# Patient Record
Sex: FEMALE | Race: WHITE | Employment: UNEMPLOYED | URBAN - METROPOLITAN AREA
[De-identification: names, ages, dates, MRNs, and addresses within clinical notes are randomized per-mention and may not be internally consistent; named-entity substitution may affect disease eponyms.]

---

## 2017-01-19 ENCOUNTER — OFFICE VISIT (OUTPATIENT)
Dept: FAMILY MEDICINE CLINIC | Age: 27
End: 2017-01-19

## 2017-01-19 ENCOUNTER — HOSPITAL ENCOUNTER (OUTPATIENT)
Age: 27
Setting detail: SPECIMEN
Discharge: HOME | End: 2017-01-19
Payer: COMMERCIAL

## 2017-01-19 VITALS
OXYGEN SATURATION: 98 % | TEMPERATURE: 97.7 F | HEIGHT: 63 IN | WEIGHT: 212.13 LBS | SYSTOLIC BLOOD PRESSURE: 122 MMHG | RESPIRATION RATE: 14 BRPM | HEART RATE: 88 BPM | DIASTOLIC BLOOD PRESSURE: 68 MMHG | BODY MASS INDEX: 37.59 KG/M2

## 2017-01-19 DIAGNOSIS — J02.9 PHARYNGITIS, UNSPECIFIED ETIOLOGY: ICD-10-CM

## 2017-01-19 DIAGNOSIS — J02.9 PHARYNGITIS, UNSPECIFIED ETIOLOGY: Primary | ICD-10-CM

## 2017-01-19 PROCEDURE — 87070 CULTURE OTHR SPECIMN AEROBIC: CPT

## 2017-01-19 PROCEDURE — 99213 OFFICE O/P EST LOW 20 MIN: CPT | Performed by: NURSE PRACTITIONER

## 2017-01-19 PROCEDURE — 87880 STREP A ASSAY W/OPTIC: CPT | Performed by: NURSE PRACTITIONER

## 2017-01-19 ASSESSMENT — ENCOUNTER SYMPTOMS
VISUAL CHANGE: 0
COUGH: 1
ABDOMINAL PAIN: 0
WHEEZING: 0
SHORTNESS OF BREATH: 0
SORE THROAT: 1
SWOLLEN GLANDS: 0

## 2017-01-22 LAB
ORGANISM: ABNORMAL
THROAT CULTURE: ABNORMAL
THROAT CULTURE: ABNORMAL

## 2017-01-23 ENCOUNTER — TELEPHONE (OUTPATIENT)
Dept: FAMILY MEDICINE CLINIC | Age: 27
End: 2017-01-23

## 2017-01-23 DIAGNOSIS — B37.0 ORAL THRUSH: Primary | ICD-10-CM

## 2017-02-21 ENCOUNTER — HOSPITAL ENCOUNTER (OUTPATIENT)
Dept: NON INVASIVE DIAGNOSTICS | Age: 27
Discharge: HOME | End: 2017-02-21
Payer: COMMERCIAL

## 2017-02-21 LAB
LV EF: 65 %
LVEF MODALITY: NORMAL

## 2017-02-21 PROCEDURE — 93306 TTE W/DOPPLER COMPLETE: CPT

## 2017-03-20 ENCOUNTER — HOSPITAL ENCOUNTER (OUTPATIENT)
Dept: RADIATION ONCOLOGY | Age: 27
Discharge: HOME | End: 2017-03-20
Payer: COMMERCIAL

## 2017-03-20 VITALS
BODY MASS INDEX: 38.09 KG/M2 | RESPIRATION RATE: 17 BRPM | DIASTOLIC BLOOD PRESSURE: 73 MMHG | TEMPERATURE: 96.6 F | HEIGHT: 63 IN | WEIGHT: 215 LBS | OXYGEN SATURATION: 97 % | HEART RATE: 75 BPM | SYSTOLIC BLOOD PRESSURE: 133 MMHG

## 2017-03-20 PROCEDURE — 99212 OFFICE O/P EST SF 10 MIN: CPT | Performed by: RADIOLOGY

## 2017-03-20 RX ORDER — ESCITALOPRAM OXALATE 10 MG/1
10 TABLET ORAL DAILY
COMMUNITY
End: 2017-07-13 | Stop reason: ALTCHOICE

## 2017-03-20 ASSESSMENT — PAIN DESCRIPTION - PAIN TYPE: TYPE: NEUROPATHIC PAIN

## 2017-03-20 ASSESSMENT — PAIN DESCRIPTION - DESCRIPTORS: DESCRIPTORS: ACHING

## 2017-03-20 ASSESSMENT — PAIN DESCRIPTION - ONSET: ONSET: AWAKENED FROM SLEEP

## 2017-03-24 ENCOUNTER — HOSPITAL ENCOUNTER (OUTPATIENT)
Dept: CT IMAGING | Age: 27
Discharge: HOME | End: 2017-03-24
Payer: COMMERCIAL

## 2017-03-24 VITALS — BODY MASS INDEX: 38.09 KG/M2 | WEIGHT: 215 LBS

## 2017-03-24 DIAGNOSIS — C50.911 MALIGNANT NEOPLASM OF RIGHT FEMALE BREAST, UNSPECIFIED SITE OF BREAST: ICD-10-CM

## 2017-03-24 PROCEDURE — A9552 F18 FDG: HCPCS | Performed by: RADIOLOGY

## 2017-03-24 PROCEDURE — 78815 PET IMAGE W/CT SKULL-THIGH: CPT

## 2017-03-24 PROCEDURE — 3430000000 HC RX DIAGNOSTIC RADIOPHARMACEUTICAL: Performed by: RADIOLOGY

## 2017-03-24 RX ORDER — FLUDEOXYGLUCOSE F 18 200 MCI/ML
15.7 INJECTION, SOLUTION INTRAVENOUS
Status: COMPLETED | OUTPATIENT
Start: 2017-03-24 | End: 2017-03-24

## 2017-03-24 RX ADMIN — FLUDEOXYGLUCOSE F 18 15.7 MILLICURIE: 200 INJECTION, SOLUTION INTRAVENOUS at 12:06

## 2017-04-24 ENCOUNTER — HOSPITAL ENCOUNTER (OUTPATIENT)
Dept: RADIATION ONCOLOGY | Age: 27
Discharge: HOME | End: 2017-04-24
Payer: COMMERCIAL

## 2017-04-24 PROCEDURE — 99214 OFFICE O/P EST MOD 30 MIN: CPT | Performed by: RADIOLOGY

## 2017-04-24 PROCEDURE — 77334 RADIATION TREATMENT AID(S): CPT | Performed by: RADIOLOGY

## 2017-04-24 PROCEDURE — 77290 THER RAD SIMULAJ FIELD CPLX: CPT | Performed by: RADIOLOGY

## 2017-05-16 ENCOUNTER — HOSPITAL ENCOUNTER (OUTPATIENT)
Dept: RADIATION ONCOLOGY | Age: 27
Discharge: HOME | End: 2017-05-16
Payer: COMMERCIAL

## 2017-05-16 PROCEDURE — 77399 UNLISTED PX MED RADJ PHYSICS: CPT | Performed by: RADIOLOGY

## 2017-05-17 PROCEDURE — 77300 RADIATION THERAPY DOSE PLAN: CPT | Performed by: RADIOLOGY

## 2017-05-17 PROCEDURE — 77332 RADIATION TREATMENT AID(S): CPT | Performed by: RADIOLOGY

## 2017-05-17 PROCEDURE — 77295 3-D RADIOTHERAPY PLAN: CPT | Performed by: RADIOLOGY

## 2017-05-17 PROCEDURE — 77334 RADIATION TREATMENT AID(S): CPT | Performed by: RADIOLOGY

## 2017-05-18 PROCEDURE — 77470 SPECIAL RADIATION TREATMENT: CPT | Performed by: RADIOLOGY

## 2017-05-22 ENCOUNTER — HOSPITAL ENCOUNTER (OUTPATIENT)
Dept: RADIATION ONCOLOGY | Age: 27
Discharge: HOME | End: 2017-05-22
Payer: COMMERCIAL

## 2017-05-22 PROCEDURE — 77387 GUIDANCE FOR RADJ TX DLVR: CPT | Performed by: RADIOLOGY

## 2017-05-22 PROCEDURE — 77280 THER RAD SIMULAJ FIELD SMPL: CPT | Performed by: RADIOLOGY

## 2017-05-23 PROCEDURE — 77412 RADIATION TX DELIVERY LVL 3: CPT | Performed by: RADIOLOGY

## 2017-05-23 PROCEDURE — 99212 OFFICE O/P EST SF 10 MIN: CPT | Performed by: RADIOLOGY

## 2017-05-24 PROCEDURE — 77412 RADIATION TX DELIVERY LVL 3: CPT | Performed by: RADIOLOGY

## 2017-05-25 PROCEDURE — 77412 RADIATION TX DELIVERY LVL 3: CPT | Performed by: RADIOLOGY

## 2017-05-26 PROCEDURE — 77412 RADIATION TX DELIVERY LVL 3: CPT | Performed by: RADIOLOGY

## 2017-05-30 ENCOUNTER — HOSPITAL ENCOUNTER (OUTPATIENT)
Dept: NON INVASIVE DIAGNOSTICS | Age: 27
Discharge: HOME | End: 2017-05-30
Payer: COMMERCIAL

## 2017-05-30 ENCOUNTER — HOSPITAL ENCOUNTER (OUTPATIENT)
Dept: RADIATION ONCOLOGY | Age: 27
Discharge: HOME | End: 2017-05-30
Payer: COMMERCIAL

## 2017-05-30 LAB
LV EF: 50 %
LVEF MODALITY: NORMAL

## 2017-05-30 PROCEDURE — 93306 TTE W/DOPPLER COMPLETE: CPT

## 2017-05-30 PROCEDURE — 77417 THER RADIOLOGY PORT IMAGE(S): CPT | Performed by: RADIOLOGY

## 2017-05-30 PROCEDURE — 77412 RADIATION TX DELIVERY LVL 3: CPT | Performed by: RADIOLOGY

## 2017-05-30 PROCEDURE — 77336 RADIATION PHYSICS CONSULT: CPT | Performed by: RADIOLOGY

## 2017-05-30 PROCEDURE — 99212 OFFICE O/P EST SF 10 MIN: CPT | Performed by: RADIOLOGY

## 2017-05-31 PROCEDURE — 77412 RADIATION TX DELIVERY LVL 3: CPT | Performed by: RADIOLOGY

## 2017-06-01 PROCEDURE — 77412 RADIATION TX DELIVERY LVL 3: CPT | Performed by: RADIOLOGY

## 2017-06-02 PROCEDURE — 77412 RADIATION TX DELIVERY LVL 3: CPT | Performed by: RADIOLOGY

## 2017-06-05 ENCOUNTER — HOSPITAL ENCOUNTER (OUTPATIENT)
Dept: RADIATION ONCOLOGY | Age: 27
Discharge: HOME | End: 2017-06-05
Payer: COMMERCIAL

## 2017-06-05 PROCEDURE — 77412 RADIATION TX DELIVERY LVL 3: CPT | Performed by: RADIOLOGY

## 2017-06-06 PROCEDURE — 77417 THER RADIOLOGY PORT IMAGE(S): CPT | Performed by: RADIOLOGY

## 2017-06-06 PROCEDURE — 77387 GUIDANCE FOR RADJ TX DLVR: CPT | Performed by: RADIOLOGY

## 2017-06-06 PROCEDURE — 99212 OFFICE O/P EST SF 10 MIN: CPT | Performed by: RADIOLOGY

## 2017-06-06 PROCEDURE — 77412 RADIATION TX DELIVERY LVL 3: CPT | Performed by: RADIOLOGY

## 2017-06-06 PROCEDURE — 77336 RADIATION PHYSICS CONSULT: CPT | Performed by: RADIOLOGY

## 2017-06-07 PROCEDURE — 77412 RADIATION TX DELIVERY LVL 3: CPT | Performed by: RADIOLOGY

## 2017-06-08 PROCEDURE — 77412 RADIATION TX DELIVERY LVL 3: CPT | Performed by: RADIOLOGY

## 2017-06-09 ENCOUNTER — HOSPITAL ENCOUNTER (OUTPATIENT)
Dept: PHYSICAL THERAPY | Age: 27
Setting detail: THERAPIES SERIES
Discharge: HOME | End: 2017-06-09
Payer: COMMERCIAL

## 2017-06-09 PROCEDURE — 77412 RADIATION TX DELIVERY LVL 3: CPT | Performed by: RADIOLOGY

## 2017-06-09 PROCEDURE — 97161 PT EVAL LOW COMPLEX 20 MIN: CPT

## 2017-06-09 ASSESSMENT — PAIN DESCRIPTION - ORIENTATION: ORIENTATION: RIGHT

## 2017-06-09 ASSESSMENT — PAIN SCALES - GENERAL: PAINLEVEL_OUTOF10: 2

## 2017-06-09 ASSESSMENT — PAIN DESCRIPTION - LOCATION: LOCATION: ARM

## 2017-06-12 ENCOUNTER — HOSPITAL ENCOUNTER (OUTPATIENT)
Dept: RADIATION ONCOLOGY | Age: 27
Discharge: HOME | End: 2017-06-12
Payer: COMMERCIAL

## 2017-06-12 PROCEDURE — 77412 RADIATION TX DELIVERY LVL 3: CPT | Performed by: RADIOLOGY

## 2017-06-12 PROCEDURE — 77334 RADIATION TREATMENT AID(S): CPT | Performed by: RADIOLOGY

## 2017-06-12 PROCEDURE — 77307 TELETHX ISODOSE PLAN CPLX: CPT | Performed by: RADIOLOGY

## 2017-06-13 ENCOUNTER — HOSPITAL ENCOUNTER (OUTPATIENT)
Dept: PHYSICAL THERAPY | Age: 27
Setting detail: THERAPIES SERIES
Discharge: HOME | End: 2017-06-13
Payer: COMMERCIAL

## 2017-06-13 PROCEDURE — 77387 GUIDANCE FOR RADJ TX DLVR: CPT | Performed by: RADIOLOGY

## 2017-06-13 PROCEDURE — 77412 RADIATION TX DELIVERY LVL 3: CPT | Performed by: RADIOLOGY

## 2017-06-13 PROCEDURE — 99212 OFFICE O/P EST SF 10 MIN: CPT | Performed by: RADIOLOGY

## 2017-06-13 PROCEDURE — 77336 RADIATION PHYSICS CONSULT: CPT | Performed by: RADIOLOGY

## 2017-06-13 PROCEDURE — 77417 THER RADIOLOGY PORT IMAGE(S): CPT | Performed by: RADIOLOGY

## 2017-06-14 PROCEDURE — 77412 RADIATION TX DELIVERY LVL 3: CPT | Performed by: RADIOLOGY

## 2017-06-15 PROCEDURE — 77412 RADIATION TX DELIVERY LVL 3: CPT | Performed by: RADIOLOGY

## 2017-06-16 ENCOUNTER — HOSPITAL ENCOUNTER (OUTPATIENT)
Dept: PHYSICAL THERAPY | Age: 27
Setting detail: THERAPIES SERIES
Discharge: HOME | End: 2017-06-16
Payer: COMMERCIAL

## 2017-06-16 PROCEDURE — 77412 RADIATION TX DELIVERY LVL 3: CPT | Performed by: RADIOLOGY

## 2017-06-19 ENCOUNTER — HOSPITAL ENCOUNTER (OUTPATIENT)
Dept: RADIATION ONCOLOGY | Age: 27
Discharge: HOME | End: 2017-06-19
Payer: COMMERCIAL

## 2017-06-19 PROCEDURE — 77412 RADIATION TX DELIVERY LVL 3: CPT | Performed by: RADIOLOGY

## 2017-06-20 ENCOUNTER — HOSPITAL ENCOUNTER (OUTPATIENT)
Dept: PHYSICAL THERAPY | Age: 27
Setting detail: THERAPIES SERIES
Discharge: HOME | End: 2017-06-20
Payer: COMMERCIAL

## 2017-06-20 PROCEDURE — 99212 OFFICE O/P EST SF 10 MIN: CPT | Performed by: RADIOLOGY

## 2017-06-20 PROCEDURE — 77336 RADIATION PHYSICS CONSULT: CPT | Performed by: RADIOLOGY

## 2017-06-20 PROCEDURE — 77412 RADIATION TX DELIVERY LVL 3: CPT | Performed by: RADIOLOGY

## 2017-06-20 PROCEDURE — 77417 THER RADIOLOGY PORT IMAGE(S): CPT | Performed by: RADIOLOGY

## 2017-06-20 PROCEDURE — 77387 GUIDANCE FOR RADJ TX DLVR: CPT | Performed by: RADIOLOGY

## 2017-06-21 PROCEDURE — 77412 RADIATION TX DELIVERY LVL 3: CPT | Performed by: RADIOLOGY

## 2017-06-22 PROCEDURE — 77412 RADIATION TX DELIVERY LVL 3: CPT | Performed by: RADIOLOGY

## 2017-06-23 ENCOUNTER — HOSPITAL ENCOUNTER (OUTPATIENT)
Dept: PHYSICAL THERAPY | Age: 27
Setting detail: THERAPIES SERIES
Discharge: HOME | End: 2017-06-23
Payer: COMMERCIAL

## 2017-06-23 PROCEDURE — 97110 THERAPEUTIC EXERCISES: CPT

## 2017-06-23 PROCEDURE — 77412 RADIATION TX DELIVERY LVL 3: CPT | Performed by: RADIOLOGY

## 2017-06-23 PROCEDURE — 97140 MANUAL THERAPY 1/> REGIONS: CPT

## 2017-06-23 ASSESSMENT — PAIN DESCRIPTION - ORIENTATION: ORIENTATION: RIGHT

## 2017-06-23 ASSESSMENT — PAIN DESCRIPTION - PAIN TYPE: TYPE: CHRONIC PAIN

## 2017-06-23 ASSESSMENT — PAIN DESCRIPTION - LOCATION: LOCATION: ARM;CHEST

## 2017-06-23 ASSESSMENT — PAIN SCALES - GENERAL: PAINLEVEL_OUTOF10: 6

## 2017-06-26 ENCOUNTER — HOSPITAL ENCOUNTER (OUTPATIENT)
Dept: RADIATION ONCOLOGY | Age: 27
Discharge: HOME | End: 2017-06-26
Payer: COMMERCIAL

## 2017-06-26 PROCEDURE — 77412 RADIATION TX DELIVERY LVL 3: CPT | Performed by: RADIOLOGY

## 2017-06-26 PROCEDURE — 77290 THER RAD SIMULAJ FIELD CPLX: CPT | Performed by: RADIOLOGY

## 2017-06-27 ENCOUNTER — HOSPITAL ENCOUNTER (OUTPATIENT)
Dept: PHYSICAL THERAPY | Age: 27
Setting detail: THERAPIES SERIES
Discharge: HOME | End: 2017-06-27
Payer: COMMERCIAL

## 2017-06-27 PROCEDURE — 99212 OFFICE O/P EST SF 10 MIN: CPT | Performed by: RADIOLOGY

## 2017-06-27 PROCEDURE — 77336 RADIATION PHYSICS CONSULT: CPT | Performed by: RADIOLOGY

## 2017-06-27 PROCEDURE — 77280 THER RAD SIMULAJ FIELD SMPL: CPT | Performed by: RADIOLOGY

## 2017-06-27 PROCEDURE — 77387 GUIDANCE FOR RADJ TX DLVR: CPT | Performed by: RADIOLOGY

## 2017-06-27 PROCEDURE — 77412 RADIATION TX DELIVERY LVL 3: CPT | Performed by: RADIOLOGY

## 2017-06-28 PROCEDURE — 77417 THER RADIOLOGY PORT IMAGE(S): CPT | Performed by: RADIOLOGY

## 2017-06-28 PROCEDURE — 77334 RADIATION TREATMENT AID(S): CPT | Performed by: RADIOLOGY

## 2017-06-28 PROCEDURE — 77300 RADIATION THERAPY DOSE PLAN: CPT | Performed by: RADIOLOGY

## 2017-06-28 PROCEDURE — 77412 RADIATION TX DELIVERY LVL 3: CPT | Performed by: RADIOLOGY

## 2017-06-29 PROCEDURE — 77412 RADIATION TX DELIVERY LVL 3: CPT | Performed by: RADIOLOGY

## 2017-06-30 PROCEDURE — 77412 RADIATION TX DELIVERY LVL 3: CPT | Performed by: RADIOLOGY

## 2017-06-30 PROCEDURE — 77280 THER RAD SIMULAJ FIELD SMPL: CPT | Performed by: RADIOLOGY

## 2017-07-03 ENCOUNTER — HOSPITAL ENCOUNTER (OUTPATIENT)
Dept: RADIATION ONCOLOGY | Age: 27
Discharge: HOME | End: 2017-07-03
Payer: COMMERCIAL

## 2017-07-03 PROCEDURE — 77412 RADIATION TX DELIVERY LVL 3: CPT | Performed by: RADIOLOGY

## 2017-07-03 PROCEDURE — 99213 OFFICE O/P EST LOW 20 MIN: CPT | Performed by: RADIOLOGY

## 2017-07-05 PROCEDURE — 77412 RADIATION TX DELIVERY LVL 3: CPT | Performed by: RADIOLOGY

## 2017-07-05 PROCEDURE — 77336 RADIATION PHYSICS CONSULT: CPT | Performed by: RADIOLOGY

## 2017-07-05 PROCEDURE — 77417 THER RADIOLOGY PORT IMAGE(S): CPT | Performed by: RADIOLOGY

## 2017-07-06 PROCEDURE — 77412 RADIATION TX DELIVERY LVL 3: CPT | Performed by: RADIOLOGY

## 2017-07-07 PROCEDURE — 77412 RADIATION TX DELIVERY LVL 3: CPT | Performed by: RADIOLOGY

## 2017-07-10 ENCOUNTER — HOSPITAL ENCOUNTER (OUTPATIENT)
Dept: RADIATION ONCOLOGY | Age: 27
Discharge: HOME | End: 2017-07-10
Payer: COMMERCIAL

## 2017-07-10 ENCOUNTER — HOSPITAL ENCOUNTER (OUTPATIENT)
Dept: PHYSICAL THERAPY | Age: 27
Discharge: HOME | End: 2017-07-10

## 2017-07-10 PROCEDURE — 77336 RADIATION PHYSICS CONSULT: CPT | Performed by: RADIOLOGY

## 2017-07-10 PROCEDURE — 77412 RADIATION TX DELIVERY LVL 3: CPT | Performed by: RADIOLOGY

## 2017-07-10 PROCEDURE — 77331 SPECIAL RADIATION DOSIMETRY: CPT | Performed by: RADIOLOGY

## 2017-07-13 ENCOUNTER — OFFICE VISIT (OUTPATIENT)
Dept: FAMILY MEDICINE CLINIC | Age: 27
End: 2017-07-13

## 2017-07-13 VITALS
TEMPERATURE: 99.9 F | RESPIRATION RATE: 20 BRPM | HEIGHT: 63 IN | SYSTOLIC BLOOD PRESSURE: 110 MMHG | DIASTOLIC BLOOD PRESSURE: 74 MMHG | BODY MASS INDEX: 37.95 KG/M2 | WEIGHT: 214.2 LBS | HEART RATE: 68 BPM

## 2017-07-13 DIAGNOSIS — F12.90 MARIJUANA USE: Chronic | ICD-10-CM

## 2017-07-13 DIAGNOSIS — G89.29 CHEST WALL PAIN, CHRONIC: Chronic | ICD-10-CM

## 2017-07-13 DIAGNOSIS — C50.111 MALIGNANT NEOPLASM OF CENTRAL PORTION OF RIGHT FEMALE BREAST (HCC): Chronic | ICD-10-CM

## 2017-07-13 DIAGNOSIS — F41.8 DEPRESSION WITH ANXIETY: Primary | Chronic | ICD-10-CM

## 2017-07-13 DIAGNOSIS — R07.89 CHEST WALL PAIN, CHRONIC: Chronic | ICD-10-CM

## 2017-07-13 DIAGNOSIS — Z13.31 POSITIVE DEPRESSION SCREENING: ICD-10-CM

## 2017-07-13 PROBLEM — E28.2 PCOS (POLYCYSTIC OVARIAN SYNDROME): Chronic | Status: ACTIVE | Noted: 2017-07-13

## 2017-07-13 PROBLEM — Z17.1 ESTROGEN RECEPTOR NEGATIVE TUMOR STATUS: Status: ACTIVE | Noted: 2017-03-28

## 2017-07-13 PROBLEM — K58.9 IBS (IRRITABLE BOWEL SYNDROME): Chronic | Status: ACTIVE | Noted: 2017-07-13

## 2017-07-13 PROBLEM — N80.9 ENDOMETRIOSIS: Chronic | Status: RESOLVED | Noted: 2017-07-13 | Resolved: 2017-07-13

## 2017-07-13 PROBLEM — N80.9 ENDOMETRIOSIS: Chronic | Status: ACTIVE | Noted: 2017-07-13

## 2017-07-13 PROBLEM — Z17.1 ESTROGEN RECEPTOR NEGATIVE TUMOR STATUS: Chronic | Status: ACTIVE | Noted: 2017-03-28

## 2017-07-13 PROBLEM — F41.9 ANXIETY: Chronic | Status: ACTIVE | Noted: 2017-07-13

## 2017-07-13 PROCEDURE — G8431 POS CLIN DEPRES SCRN F/U DOC: HCPCS | Performed by: FAMILY MEDICINE

## 2017-07-13 PROCEDURE — G0444 DEPRESSION SCREEN ANNUAL: HCPCS | Performed by: FAMILY MEDICINE

## 2017-07-13 PROCEDURE — 99214 OFFICE O/P EST MOD 30 MIN: CPT | Performed by: FAMILY MEDICINE

## 2017-07-13 RX ORDER — LORAZEPAM 0.5 MG/1
0.5 TABLET ORAL EVERY 6 HOURS PRN
COMMUNITY
End: 2017-07-13 | Stop reason: SDUPTHER

## 2017-07-13 RX ORDER — DULOXETIN HYDROCHLORIDE 30 MG/1
30 CAPSULE, DELAYED RELEASE ORAL DAILY
Qty: 7 CAPSULE | Refills: 0 | Status: SHIPPED | OUTPATIENT
Start: 2017-07-13 | End: 2017-07-28

## 2017-07-13 RX ORDER — LORAZEPAM 0.5 MG/1
TABLET ORAL
Qty: 30 TABLET | Refills: 0 | Status: SHIPPED | OUTPATIENT
Start: 2017-07-13 | End: 2017-11-16 | Stop reason: SDUPTHER

## 2017-07-13 RX ORDER — TAPENTADOL HYDROCHLORIDE 50 MG/1
TABLET, FILM COATED ORAL
Status: ON HOLD | COMMUNITY
Start: 2017-06-29 | End: 2017-07-21 | Stop reason: HOSPADM

## 2017-07-13 RX ORDER — DICLOFENAC SODIUM 75 MG/1
75 TABLET, DELAYED RELEASE ORAL 3 TIMES DAILY PRN
COMMUNITY
Start: 2017-06-29 | End: 2019-12-02

## 2017-07-13 RX ORDER — DULOXETIN HYDROCHLORIDE 60 MG/1
60 CAPSULE, DELAYED RELEASE ORAL DAILY
Qty: 30 CAPSULE | Refills: 3 | Status: SHIPPED | OUTPATIENT
Start: 2017-07-20 | End: 2017-07-28

## 2017-07-13 RX ORDER — TIZANIDINE 4 MG/1
TABLET ORAL
COMMUNITY
Start: 2017-06-07 | End: 2019-12-02

## 2017-07-13 ASSESSMENT — PATIENT HEALTH QUESTIONNAIRE - PHQ9
9. THOUGHTS THAT YOU WOULD BE BETTER OFF DEAD, OR OF HURTING YOURSELF: 0
10. IF YOU CHECKED OFF ANY PROBLEMS, HOW DIFFICULT HAVE THESE PROBLEMS MADE IT FOR YOU TO DO YOUR WORK, TAKE CARE OF THINGS AT HOME, OR GET ALONG WITH OTHER PEOPLE: 3
2. FEELING DOWN, DEPRESSED OR HOPELESS: 2
4. FEELING TIRED OR HAVING LITTLE ENERGY: 2
5. POOR APPETITE OR OVEREATING: 2
8. MOVING OR SPEAKING SO SLOWLY THAT OTHER PEOPLE COULD HAVE NOTICED. OR THE OPPOSITE, BEING SO FIGETY OR RESTLESS THAT YOU HAVE BEEN MOVING AROUND A LOT MORE THAN USUAL: 0
6. FEELING BAD ABOUT YOURSELF - OR THAT YOU ARE A FAILURE OR HAVE LET YOURSELF OR YOUR FAMILY DOWN: 2
SUM OF ALL RESPONSES TO PHQ9 QUESTIONS 1 & 2: 4
3. TROUBLE FALLING OR STAYING ASLEEP: 2
1. LITTLE INTEREST OR PLEASURE IN DOING THINGS: 2
SUM OF ALL RESPONSES TO PHQ QUESTIONS 1-9: 14
7. TROUBLE CONCENTRATING ON THINGS, SUCH AS READING THE NEWSPAPER OR WATCHING TELEVISION: 2

## 2017-07-13 ASSESSMENT — ENCOUNTER SYMPTOMS
COUGH: 0
CONSTIPATION: 0
CHEST TIGHTNESS: 0
DIARRHEA: 0
NAUSEA: 0
SHORTNESS OF BREATH: 0
VOMITING: 0
ABDOMINAL PAIN: 0
WHEEZING: 0

## 2017-07-17 ENCOUNTER — APPOINTMENT (OUTPATIENT)
Dept: CT IMAGING | Age: 27
End: 2017-07-17
Payer: COMMERCIAL

## 2017-07-17 ENCOUNTER — HOSPITAL ENCOUNTER (EMERGENCY)
Age: 27
Discharge: ANOTHER HOSPITAL | End: 2017-07-17
Attending: EMERGENCY MEDICINE | Admitting: SURGERY
Payer: COMMERCIAL

## 2017-07-17 ENCOUNTER — ANESTHESIA EVENT (OUTPATIENT)
Dept: OPERATING ROOM | Age: 27
DRG: 331 | End: 2017-07-17
Payer: COMMERCIAL

## 2017-07-17 ENCOUNTER — ANESTHESIA (OUTPATIENT)
Dept: OPERATING ROOM | Age: 27
DRG: 331 | End: 2017-07-17
Payer: COMMERCIAL

## 2017-07-17 ENCOUNTER — HOSPITAL ENCOUNTER (INPATIENT)
Age: 27
LOS: 6 days | Discharge: HOME | DRG: 331 | End: 2017-07-23
Attending: SURGERY | Admitting: SURGERY
Payer: COMMERCIAL

## 2017-07-17 ENCOUNTER — APPOINTMENT (OUTPATIENT)
Dept: GENERAL RADIOLOGY | Age: 27
End: 2017-07-17
Payer: COMMERCIAL

## 2017-07-17 VITALS
DIASTOLIC BLOOD PRESSURE: 59 MMHG | HEART RATE: 77 BPM | TEMPERATURE: 97.2 F | WEIGHT: 214 LBS | HEIGHT: 63 IN | SYSTOLIC BLOOD PRESSURE: 123 MMHG | RESPIRATION RATE: 16 BRPM | OXYGEN SATURATION: 100 % | BODY MASS INDEX: 37.92 KG/M2

## 2017-07-17 VITALS — OXYGEN SATURATION: 100 % | DIASTOLIC BLOOD PRESSURE: 74 MMHG | SYSTOLIC BLOOD PRESSURE: 118 MMHG | TEMPERATURE: 95.9 F

## 2017-07-17 DIAGNOSIS — R10.84 GENERALIZED ABDOMINAL PAIN: ICD-10-CM

## 2017-07-17 DIAGNOSIS — R19.8 PERFORATED VISCUS: Primary | ICD-10-CM

## 2017-07-17 PROBLEM — R93.5 ABNORMAL CT OF THE ABDOMEN: Status: ACTIVE | Noted: 2017-07-17

## 2017-07-17 LAB
ALBUMIN SERPL-MCNC: 4.2 G/DL (ref 3.9–4.9)
ALP BLD-CCNC: 67 U/L (ref 40–130)
ALT SERPL-CCNC: 21 U/L (ref 0–33)
AMYLASE: 44 U/L (ref 28–100)
ANION GAP SERPL CALCULATED.3IONS-SCNC: 16 MEQ/L (ref 7–13)
AST SERPL-CCNC: 20 U/L (ref 0–35)
BASOPHILS ABSOLUTE: 0 K/UL (ref 0–0.2)
BASOPHILS RELATIVE PERCENT: 0.1 %
BILIRUB SERPL-MCNC: 0.3 MG/DL (ref 0–1.2)
BILIRUBIN URINE: NEGATIVE
BLOOD, URINE: NEGATIVE
BUN BLDV-MCNC: 11 MG/DL (ref 6–20)
CALCIUM SERPL-MCNC: 9 MG/DL (ref 8.6–10.2)
CHLORIDE BLD-SCNC: 104 MEQ/L (ref 98–107)
CLARITY: CLEAR
CO2: 20 MEQ/L (ref 22–29)
COLOR: NORMAL
CREAT SERPL-MCNC: 0.47 MG/DL (ref 0.5–0.9)
EOSINOPHILS ABSOLUTE: 0.1 K/UL (ref 0–0.7)
EOSINOPHILS RELATIVE PERCENT: 1.7 %
GFR AFRICAN AMERICAN: >60
GFR NON-AFRICAN AMERICAN: >60
GLOBULIN: 3 G/DL (ref 2.3–3.5)
GLUCOSE BLD-MCNC: 92 MG/DL (ref 74–109)
GLUCOSE URINE: NEGATIVE MG/DL
HCT VFR BLD CALC: 39.3 % (ref 37–47)
HEMOGLOBIN: 13.7 G/DL (ref 12–16)
INR BLD: 0.9
KETONES, URINE: NEGATIVE MG/DL
LEUKOCYTE ESTERASE, URINE: NEGATIVE
LIPASE: 29 U/L (ref 13–60)
LYMPHOCYTES ABSOLUTE: 0.7 K/UL (ref 1–4.8)
LYMPHOCYTES RELATIVE PERCENT: 10.6 %
MCH RBC QN AUTO: 31.3 PG (ref 27–31.3)
MCHC RBC AUTO-ENTMCNC: 34.8 % (ref 33–37)
MCV RBC AUTO: 89.9 FL (ref 82–100)
MONOCYTES ABSOLUTE: 0.5 K/UL (ref 0.2–0.8)
MONOCYTES RELATIVE PERCENT: 6.5 %
NEUTROPHILS ABSOLUTE: 5.7 K/UL (ref 1.4–6.5)
NEUTROPHILS RELATIVE PERCENT: 81.1 %
NITRITE, URINE: NEGATIVE
PDW BLD-RTO: 13.9 % (ref 11.5–14.5)
PH UA: 5 (ref 5–9)
PLATELET # BLD: 257 K/UL (ref 130–400)
POTASSIUM SERPL-SCNC: 4 MEQ/L (ref 3.5–5.1)
PROTEIN UA: NEGATIVE MG/DL
PROTHROMBIN TIME: 9.6 SEC (ref 9.6–12.3)
RBC # BLD: 4.37 M/UL (ref 4.2–5.4)
SODIUM BLD-SCNC: 140 MEQ/L (ref 132–144)
SPECIFIC GRAVITY UA: <=1.005 (ref 1–1.03)
TOTAL PROTEIN: 7.2 G/DL (ref 6.4–8.1)
URINE REFLEX TO CULTURE: NORMAL
URINE TYPE: NORMAL
UROBILINOGEN, URINE: 0.2 E.U./DL
WBC # BLD: 7 K/UL (ref 4.8–10.8)

## 2017-07-17 PROCEDURE — 96376 TX/PRO/DX INJ SAME DRUG ADON: CPT

## 2017-07-17 PROCEDURE — 2720000010 HC SURG SUPPLY STERILE: Performed by: SURGERY

## 2017-07-17 PROCEDURE — 82150 ASSAY OF AMYLASE: CPT

## 2017-07-17 PROCEDURE — 83690 ASSAY OF LIPASE: CPT

## 2017-07-17 PROCEDURE — 44604 SUTURE LARGE INTESTINE: CPT | Performed by: SURGERY

## 2017-07-17 PROCEDURE — 6360000002 HC RX W HCPCS: Performed by: STUDENT IN AN ORGANIZED HEALTH CARE EDUCATION/TRAINING PROGRAM

## 2017-07-17 PROCEDURE — 1210000000 HC MED SURG R&B

## 2017-07-17 PROCEDURE — 6360000002 HC RX W HCPCS

## 2017-07-17 PROCEDURE — 7100000001 HC PACU RECOVERY - ADDTL 15 MIN: Performed by: SURGERY

## 2017-07-17 PROCEDURE — 2500000003 HC RX 250 WO HCPCS: Performed by: STUDENT IN AN ORGANIZED HEALTH CARE EDUCATION/TRAINING PROGRAM

## 2017-07-17 PROCEDURE — 3600000004 HC SURGERY LEVEL 4 BASE: Performed by: SURGERY

## 2017-07-17 PROCEDURE — 74022 RADEX COMPL AQT ABD SERIES: CPT

## 2017-07-17 PROCEDURE — 2580000003 HC RX 258: Performed by: SURGERY

## 2017-07-17 PROCEDURE — 3700000001 HC ADD 15 MINUTES (ANESTHESIA): Performed by: SURGERY

## 2017-07-17 PROCEDURE — 85025 COMPLETE CBC W/AUTO DIFF WBC: CPT

## 2017-07-17 PROCEDURE — 81003 URINALYSIS AUTO W/O SCOPE: CPT

## 2017-07-17 PROCEDURE — 85610 PROTHROMBIN TIME: CPT

## 2017-07-17 PROCEDURE — 74176 CT ABD & PELVIS W/O CONTRAST: CPT

## 2017-07-17 PROCEDURE — 36415 COLL VENOUS BLD VENIPUNCTURE: CPT

## 2017-07-17 PROCEDURE — 2580000003 HC RX 258: Performed by: STUDENT IN AN ORGANIZED HEALTH CARE EDUCATION/TRAINING PROGRAM

## 2017-07-17 PROCEDURE — 80053 COMPREHEN METABOLIC PANEL: CPT

## 2017-07-17 PROCEDURE — 2580000003 HC RX 258: Performed by: EMERGENCY MEDICINE

## 2017-07-17 PROCEDURE — 7100000000 HC PACU RECOVERY - FIRST 15 MIN: Performed by: SURGERY

## 2017-07-17 PROCEDURE — 96365 THER/PROPH/DIAG IV INF INIT: CPT

## 2017-07-17 PROCEDURE — 99285 EMERGENCY DEPT VISIT HI MDM: CPT

## 2017-07-17 PROCEDURE — 6360000002 HC RX W HCPCS: Performed by: EMERGENCY MEDICINE

## 2017-07-17 PROCEDURE — 3700000000 HC ANESTHESIA ATTENDED CARE: Performed by: SURGERY

## 2017-07-17 PROCEDURE — 6360000002 HC RX W HCPCS: Performed by: SURGERY

## 2017-07-17 PROCEDURE — 3600000014 HC SURGERY LEVEL 4 ADDTL 15MIN: Performed by: SURGERY

## 2017-07-17 PROCEDURE — 88304 TISSUE EXAM BY PATHOLOGIST: CPT

## 2017-07-17 PROCEDURE — 6370000000 HC RX 637 (ALT 250 FOR IP): Performed by: SURGERY

## 2017-07-17 PROCEDURE — 96375 TX/PRO/DX INJ NEW DRUG ADDON: CPT

## 2017-07-17 RX ORDER — LIDOCAINE AND PRILOCAINE 25; 25 MG/G; MG/G
CREAM TOPICAL ONCE
Status: DISCONTINUED | OUTPATIENT
Start: 2017-07-17 | End: 2017-07-17 | Stop reason: HOSPADM

## 2017-07-17 RX ORDER — DIPHENHYDRAMINE HYDROCHLORIDE 50 MG/ML
12.5 INJECTION INTRAMUSCULAR; INTRAVENOUS
Status: DISCONTINUED | OUTPATIENT
Start: 2017-07-17 | End: 2017-07-17 | Stop reason: HOSPADM

## 2017-07-17 RX ORDER — FENTANYL CITRATE 50 UG/ML
50 INJECTION, SOLUTION INTRAMUSCULAR; INTRAVENOUS EVERY 10 MIN PRN
Status: DISCONTINUED | OUTPATIENT
Start: 2017-07-17 | End: 2020-01-16

## 2017-07-17 RX ORDER — HYDROCODONE BITARTRATE AND ACETAMINOPHEN 5; 325 MG/1; MG/1
2 TABLET ORAL PRN
Status: DISCONTINUED | OUTPATIENT
Start: 2017-07-17 | End: 2017-07-17 | Stop reason: HOSPADM

## 2017-07-17 RX ORDER — ONDANSETRON 2 MG/ML
4 INJECTION INTRAMUSCULAR; INTRAVENOUS
Status: DISCONTINUED | OUTPATIENT
Start: 2017-07-17 | End: 2017-07-17 | Stop reason: HOSPADM

## 2017-07-17 RX ORDER — FENTANYL CITRATE 50 UG/ML
INJECTION, SOLUTION INTRAMUSCULAR; INTRAVENOUS
Status: DISPENSED
Start: 2017-07-17 | End: 2017-07-18

## 2017-07-17 RX ORDER — 0.9 % SODIUM CHLORIDE 0.9 %
1000 INTRAVENOUS SOLUTION INTRAVENOUS ONCE
Status: COMPLETED | OUTPATIENT
Start: 2017-07-17 | End: 2017-07-17

## 2017-07-17 RX ORDER — ONDANSETRON 2 MG/ML
4 INJECTION INTRAMUSCULAR; INTRAVENOUS ONCE
Status: COMPLETED | OUTPATIENT
Start: 2017-07-17 | End: 2017-07-17

## 2017-07-17 RX ORDER — MEPERIDINE HYDROCHLORIDE 50 MG/ML
12.5 INJECTION INTRAMUSCULAR; INTRAVENOUS; SUBCUTANEOUS EVERY 5 MIN PRN
Status: DISCONTINUED | OUTPATIENT
Start: 2017-07-17 | End: 2020-01-16

## 2017-07-17 RX ORDER — ONDANSETRON 2 MG/ML
4 INJECTION INTRAMUSCULAR; INTRAVENOUS
Status: ACTIVE | OUTPATIENT
Start: 2017-07-17 | End: 2017-07-17

## 2017-07-17 RX ORDER — FENTANYL CITRATE 50 UG/ML
INJECTION, SOLUTION INTRAMUSCULAR; INTRAVENOUS PRN
Status: DISCONTINUED | OUTPATIENT
Start: 2017-07-17 | End: 2017-07-17 | Stop reason: SDUPTHER

## 2017-07-17 RX ORDER — MORPHINE SULFATE 4 MG/ML
4 INJECTION, SOLUTION INTRAMUSCULAR; INTRAVENOUS ONCE
Status: COMPLETED | OUTPATIENT
Start: 2017-07-17 | End: 2017-07-17

## 2017-07-17 RX ORDER — METOCLOPRAMIDE HYDROCHLORIDE 5 MG/ML
10 INJECTION INTRAMUSCULAR; INTRAVENOUS
Status: ACTIVE | OUTPATIENT
Start: 2017-07-17 | End: 2017-07-17

## 2017-07-17 RX ORDER — METOCLOPRAMIDE HYDROCHLORIDE 5 MG/ML
10 INJECTION INTRAMUSCULAR; INTRAVENOUS
Status: DISCONTINUED | OUTPATIENT
Start: 2017-07-17 | End: 2017-07-17 | Stop reason: HOSPADM

## 2017-07-17 RX ORDER — SUCCINYLCHOLINE CHLORIDE 20 MG/ML
INJECTION INTRAMUSCULAR; INTRAVENOUS PRN
Status: DISCONTINUED | OUTPATIENT
Start: 2017-07-17 | End: 2017-07-17 | Stop reason: SDUPTHER

## 2017-07-17 RX ORDER — MEPERIDINE HYDROCHLORIDE 50 MG/ML
12.5 INJECTION INTRAMUSCULAR; INTRAVENOUS; SUBCUTANEOUS EVERY 5 MIN PRN
Status: DISCONTINUED | OUTPATIENT
Start: 2017-07-17 | End: 2017-07-17 | Stop reason: HOSPADM

## 2017-07-17 RX ORDER — MAGNESIUM HYDROXIDE 1200 MG/15ML
LIQUID ORAL CONTINUOUS PRN
Status: DISCONTINUED | OUTPATIENT
Start: 2017-07-17 | End: 2017-07-17 | Stop reason: HOSPADM

## 2017-07-17 RX ORDER — DIPHENHYDRAMINE HYDROCHLORIDE 50 MG/ML
50 INJECTION INTRAMUSCULAR; INTRAVENOUS ONCE
Status: COMPLETED | OUTPATIENT
Start: 2017-07-17 | End: 2017-07-17

## 2017-07-17 RX ORDER — HYDROCODONE BITARTRATE AND ACETAMINOPHEN 5; 325 MG/1; MG/1
1 TABLET ORAL PRN
Status: ACTIVE | OUTPATIENT
Start: 2017-07-17 | End: 2017-07-17

## 2017-07-17 RX ORDER — HYDROCODONE BITARTRATE AND ACETAMINOPHEN 5; 325 MG/1; MG/1
2 TABLET ORAL PRN
Status: ACTIVE | OUTPATIENT
Start: 2017-07-17 | End: 2017-07-17

## 2017-07-17 RX ORDER — SODIUM CHLORIDE 9 MG/ML
INJECTION, SOLUTION INTRAVENOUS CONTINUOUS
Status: DISCONTINUED | OUTPATIENT
Start: 2017-07-17 | End: 2017-07-20

## 2017-07-17 RX ORDER — ONDANSETRON 2 MG/ML
4 INJECTION INTRAMUSCULAR; INTRAVENOUS EVERY 6 HOURS PRN
Status: DISCONTINUED | OUTPATIENT
Start: 2017-07-17 | End: 2017-07-23 | Stop reason: HOSPADM

## 2017-07-17 RX ORDER — FENTANYL CITRATE 50 UG/ML
50 INJECTION, SOLUTION INTRAMUSCULAR; INTRAVENOUS EVERY 10 MIN PRN
Status: DISCONTINUED | OUTPATIENT
Start: 2017-07-17 | End: 2017-07-17 | Stop reason: HOSPADM

## 2017-07-17 RX ORDER — HYDROMORPHONE HCL 110MG/55ML
0.5 PATIENT CONTROLLED ANALGESIA SYRINGE INTRAVENOUS EVERY 10 MIN PRN
Status: DISCONTINUED | OUTPATIENT
Start: 2017-07-17 | End: 2020-01-16

## 2017-07-17 RX ORDER — DIPHENHYDRAMINE HYDROCHLORIDE 50 MG/ML
12.5 INJECTION INTRAMUSCULAR; INTRAVENOUS
Status: ACTIVE | OUTPATIENT
Start: 2017-07-17 | End: 2017-07-17

## 2017-07-17 RX ORDER — KETOROLAC TROMETHAMINE 30 MG/ML
30 INJECTION, SOLUTION INTRAMUSCULAR; INTRAVENOUS EVERY 6 HOURS
Status: COMPLETED | OUTPATIENT
Start: 2017-07-17 | End: 2017-07-19

## 2017-07-17 RX ORDER — WOUND DRESSING ADHESIVE - LIQUID
LIQUID MISCELLANEOUS PRN
Status: DISCONTINUED | OUTPATIENT
Start: 2017-07-17 | End: 2021-07-23

## 2017-07-17 RX ORDER — MORPHINE SULFATE 4 MG/ML
INJECTION, SOLUTION INTRAMUSCULAR; INTRAVENOUS
Status: COMPLETED
Start: 2017-07-17 | End: 2017-07-17

## 2017-07-17 RX ORDER — ROCURONIUM BROMIDE 10 MG/ML
INJECTION, SOLUTION INTRAVENOUS PRN
Status: DISCONTINUED | OUTPATIENT
Start: 2017-07-17 | End: 2017-07-17 | Stop reason: SDUPTHER

## 2017-07-17 RX ORDER — HYDROCODONE BITARTRATE AND ACETAMINOPHEN 5; 325 MG/1; MG/1
1 TABLET ORAL PRN
Status: DISCONTINUED | OUTPATIENT
Start: 2017-07-17 | End: 2017-07-17 | Stop reason: HOSPADM

## 2017-07-17 RX ORDER — ACETAMINOPHEN 650 MG/1
650 SUPPOSITORY RECTAL EVERY 4 HOURS PRN
Status: DISCONTINUED | OUTPATIENT
Start: 2017-07-17 | End: 2017-07-23 | Stop reason: HOSPADM

## 2017-07-17 RX ORDER — KETOROLAC TROMETHAMINE 30 MG/ML
INJECTION, SOLUTION INTRAMUSCULAR; INTRAVENOUS PRN
Status: DISCONTINUED | OUTPATIENT
Start: 2017-07-17 | End: 2017-07-17 | Stop reason: SDUPTHER

## 2017-07-17 RX ORDER — ONDANSETRON 2 MG/ML
INJECTION INTRAMUSCULAR; INTRAVENOUS PRN
Status: DISCONTINUED | OUTPATIENT
Start: 2017-07-17 | End: 2017-07-17 | Stop reason: SDUPTHER

## 2017-07-17 RX ORDER — METOCLOPRAMIDE HYDROCHLORIDE 5 MG/ML
10 INJECTION INTRAMUSCULAR; INTRAVENOUS ONCE
Status: COMPLETED | OUTPATIENT
Start: 2017-07-17 | End: 2017-07-17

## 2017-07-17 RX ORDER — SODIUM CHLORIDE, SODIUM LACTATE, POTASSIUM CHLORIDE, CALCIUM CHLORIDE 600; 310; 30; 20 MG/100ML; MG/100ML; MG/100ML; MG/100ML
INJECTION, SOLUTION INTRAVENOUS CONTINUOUS PRN
Status: DISCONTINUED | OUTPATIENT
Start: 2017-07-17 | End: 2017-07-17 | Stop reason: SDUPTHER

## 2017-07-17 RX ORDER — PROPOFOL 10 MG/ML
INJECTION, EMULSION INTRAVENOUS PRN
Status: DISCONTINUED | OUTPATIENT
Start: 2017-07-17 | End: 2017-07-17 | Stop reason: SDUPTHER

## 2017-07-17 RX ORDER — MIDAZOLAM HYDROCHLORIDE 1 MG/ML
INJECTION INTRAMUSCULAR; INTRAVENOUS PRN
Status: DISCONTINUED | OUTPATIENT
Start: 2017-07-17 | End: 2017-07-17 | Stop reason: SDUPTHER

## 2017-07-17 RX ORDER — SODIUM CHLORIDE 9 MG/ML
INJECTION, SOLUTION INTRAVENOUS CONTINUOUS
Status: DISCONTINUED | OUTPATIENT
Start: 2017-07-17 | End: 2017-07-17 | Stop reason: HOSPADM

## 2017-07-17 RX ORDER — HYDROMORPHONE HCL 110MG/55ML
0.5 PATIENT CONTROLLED ANALGESIA SYRINGE INTRAVENOUS EVERY 10 MIN PRN
Status: DISCONTINUED | OUTPATIENT
Start: 2017-07-17 | End: 2017-07-17 | Stop reason: HOSPADM

## 2017-07-17 RX ADMIN — SUGAMMADEX 200 MG: 100 INJECTION, SOLUTION INTRAVENOUS at 20:25

## 2017-07-17 RX ADMIN — FENTANYL CITRATE 100 MCG: 50 INJECTION, SOLUTION INTRAMUSCULAR; INTRAVENOUS at 19:20

## 2017-07-17 RX ADMIN — MIDAZOLAM HYDROCHLORIDE 2 MG: 1 INJECTION, SOLUTION INTRAMUSCULAR; INTRAVENOUS at 19:14

## 2017-07-17 RX ADMIN — SODIUM CHLORIDE, POTASSIUM CHLORIDE, SODIUM LACTATE AND CALCIUM CHLORIDE: 600; 310; 30; 20 INJECTION, SOLUTION INTRAVENOUS at 20:09

## 2017-07-17 RX ADMIN — PIPERACILLIN SODIUM AND TAZOBACTAM SODIUM 3.38 G: 3; .375 INJECTION, POWDER, LYOPHILIZED, FOR SOLUTION INTRAVENOUS at 23:55

## 2017-07-17 RX ADMIN — HYDROMORPHONE HYDROCHLORIDE 1 MG: 1 INJECTION, SOLUTION INTRAMUSCULAR; INTRAVENOUS; SUBCUTANEOUS at 23:40

## 2017-07-17 RX ADMIN — ONDANSETRON 4 MG: 2 INJECTION, SOLUTION INTRAMUSCULAR; INTRAVENOUS at 20:17

## 2017-07-17 RX ADMIN — SODIUM CHLORIDE: 9 INJECTION, SOLUTION INTRAVENOUS at 16:56

## 2017-07-17 RX ADMIN — DIPHENHYDRAMINE HYDROCHLORIDE 50 MG: 50 INJECTION INTRAMUSCULAR; INTRAVENOUS at 13:57

## 2017-07-17 RX ADMIN — SODIUM CHLORIDE: 9 INJECTION, SOLUTION INTRAVENOUS at 23:00

## 2017-07-17 RX ADMIN — MORPHINE SULFATE 4 MG: 4 INJECTION, SOLUTION INTRAMUSCULAR; INTRAVENOUS at 15:43

## 2017-07-17 RX ADMIN — PIPERACILLIN SODIUM,TAZOBACTAM SODIUM 3.38 G: 3; .375 INJECTION, POWDER, FOR SOLUTION INTRAVENOUS at 14:58

## 2017-07-17 RX ADMIN — ROCURONIUM BROMIDE 50 MG: 10 INJECTION INTRAVENOUS at 19:28

## 2017-07-17 RX ADMIN — FENTANYL CITRATE 50 MCG: 50 INJECTION, SOLUTION INTRAMUSCULAR; INTRAVENOUS at 20:05

## 2017-07-17 RX ADMIN — MORPHINE SULFATE 4 MG: 4 INJECTION, SOLUTION INTRAMUSCULAR; INTRAVENOUS at 13:57

## 2017-07-17 RX ADMIN — PROPOFOL 200 MG: 10 INJECTION, EMULSION INTRAVENOUS at 19:20

## 2017-07-17 RX ADMIN — SODIUM CHLORIDE, POTASSIUM CHLORIDE, SODIUM LACTATE AND CALCIUM CHLORIDE: 600; 310; 30; 20 INJECTION, SOLUTION INTRAVENOUS at 19:10

## 2017-07-17 RX ADMIN — HYDROMORPHONE HYDROCHLORIDE 0.5 MG: 1 INJECTION, SOLUTION INTRAMUSCULAR; INTRAVENOUS; SUBCUTANEOUS at 22:11

## 2017-07-17 RX ADMIN — ONDANSETRON 4 MG: 2 INJECTION INTRAMUSCULAR; INTRAVENOUS at 15:43

## 2017-07-17 RX ADMIN — KETOROLAC TROMETHAMINE 30 MG: 30 INJECTION, SOLUTION INTRAMUSCULAR; INTRAVENOUS at 20:17

## 2017-07-17 RX ADMIN — FENTANYL CITRATE 50 MCG: 50 INJECTION, SOLUTION INTRAMUSCULAR; INTRAVENOUS at 19:40

## 2017-07-17 RX ADMIN — FENTANYL CITRATE 50 MCG: 50 INJECTION, SOLUTION INTRAMUSCULAR; INTRAVENOUS at 21:14

## 2017-07-17 RX ADMIN — SODIUM CHLORIDE, POTASSIUM CHLORIDE, SODIUM LACTATE AND CALCIUM CHLORIDE: 600; 310; 30; 20 INJECTION, SOLUTION INTRAVENOUS at 19:45

## 2017-07-17 RX ADMIN — FENTANYL CITRATE 50 MCG: 50 INJECTION, SOLUTION INTRAMUSCULAR; INTRAVENOUS at 21:28

## 2017-07-17 RX ADMIN — HYDROMORPHONE HYDROCHLORIDE 0.5 MG: 2 INJECTION, SOLUTION INTRAMUSCULAR; INTRAVENOUS; SUBCUTANEOUS at 21:52

## 2017-07-17 RX ADMIN — SODIUM CHLORIDE 1000 ML: 9 INJECTION, SOLUTION INTRAVENOUS at 13:57

## 2017-07-17 RX ADMIN — SUCCINYLCHOLINE CHLORIDE 100 MG: 20 INJECTION, SOLUTION INTRAMUSCULAR; INTRAVENOUS at 19:20

## 2017-07-17 RX ADMIN — METOCLOPRAMIDE 10 MG: 5 INJECTION, SOLUTION INTRAMUSCULAR; INTRAVENOUS at 13:57

## 2017-07-17 ASSESSMENT — ENCOUNTER SYMPTOMS
ABDOMINAL PAIN: 1
DIARRHEA: 0
NAUSEA: 0
BLOOD IN STOOL: 0
EYE DISCHARGE: 0
CHOKING: 0
VOICE CHANGE: 0
SORE THROAT: 0
COUGH: 0
VOMITING: 0
STRIDOR: 0
FACIAL SWELLING: 0
RESPIRATORY NEGATIVE: 1
EYE REDNESS: 0
VOMITING: 0
WHEEZING: 0
CHEST TIGHTNESS: 0
TROUBLE SWALLOWING: 0
SHORTNESS OF BREATH: 0
EYE PAIN: 0
CONSTIPATION: 0
BACK PAIN: 0
ABDOMINAL PAIN: 1
SINUS PRESSURE: 0

## 2017-07-17 ASSESSMENT — PAIN DESCRIPTION - LOCATION
LOCATION: ARM
LOCATION: ARM;ABDOMEN;LEG
LOCATION: ABDOMEN;CHEST

## 2017-07-17 ASSESSMENT — PAIN DESCRIPTION - PROGRESSION
CLINICAL_PROGRESSION: GRADUALLY IMPROVING

## 2017-07-17 ASSESSMENT — PAIN SCALES - GENERAL
PAINLEVEL_OUTOF10: 6
PAINLEVEL_OUTOF10: 5
PAINLEVEL_OUTOF10: 10
PAINLEVEL_OUTOF10: 5
PAINLEVEL_OUTOF10: 5
PAINLEVEL_OUTOF10: 7
PAINLEVEL_OUTOF10: 10
PAINLEVEL_OUTOF10: 6
PAINLEVEL_OUTOF10: 4
PAINLEVEL_OUTOF10: 5
PAINLEVEL_OUTOF10: 5

## 2017-07-17 ASSESSMENT — PAIN DESCRIPTION - DESCRIPTORS
DESCRIPTORS: ACHING
DESCRIPTORS: PRESSURE;SHARP
DESCRIPTORS: ACHING

## 2017-07-17 ASSESSMENT — PAIN DESCRIPTION - PAIN TYPE
TYPE: ACUTE PAIN

## 2017-07-17 ASSESSMENT — PAIN DESCRIPTION - FREQUENCY
FREQUENCY: CONTINUOUS

## 2017-07-17 ASSESSMENT — PAIN DESCRIPTION - ONSET
ONSET: GRADUAL
ONSET: ON-GOING
ONSET: SUDDEN

## 2017-07-17 ASSESSMENT — PAIN DESCRIPTION - ORIENTATION: ORIENTATION: RIGHT;LEFT

## 2017-07-17 NOTE — ED NOTES
Per Dr. Mary Gagnon placed a call to the [facility] at 15:08. I spoke with Jerome Sands about transferring this pt to [facility]. Nik Franco  07/17/17 1513    Dr. Jessica Finn called back at 15:36. Dr. Jessica Finn requested to be called back in 30 minutes before she can accept the pt for a transfer. Nik Franco  07/17/17 1545    Dr. Jessica Finn called back at 16:16, and accepted the pt.      Nik Franco  07/17/17 [facility]  07/17/17 162

## 2017-07-17 NOTE — ED NOTES
[facility] Yeny Saleh gave me the bed assignment of 469 at [address] [facility], nurse report [phone].      Rosalene Merlin  07/17/17 2144

## 2017-07-17 NOTE — ED PROVIDER NOTES
[facility] ED  eMERGENCY dEPARTMENT eNCOUnter      Pt Name: Nemesio Hobbs  MRN: [id number]  [place] 1990  Date of evaluation: 7/17/2017  Provider: Edwina Bowman MD    [address] [place] [address]       Chief Complaint   Patient presents with    Abdominal Pain     Pt c/o ABD pain into chest. Pt states she feels \" something rolling\" in ABD. Onset friday night during intercourse         HISTORY OF PRESENT ILLNESS   (Location/Symptom, Timing/Onset, Context/Setting, Quality, Duration, Modifying Factors, Severity)  Note limiting factors. Nemesio Hobbs is a 28 y.o. female who presents to the emergency department  patient is status post mastectomy due to malignant neoplasm of the breast patient is status post lumpectomy status post radiation and at the present time patient is on pain management as well as anxiety depression for which patient has been recently given Ativan, patient admits to be doing marijuana, comes to this emergency for abdominal discomfort and pain and feel as if something is moving inside which happened after sexual intercourse 3 days ago, patient does have bowel movements but no diarrhea no fever no chills no UTI symptoms no history of kidney stone patient is status post hysterectomy, patient has no swelling of the legs has no pleuritic pain no short of breath no cough congestion or fever no chills, denies seeing any blood in the stool    HPI    Nursing Notes were reviewed. REVIEW OF SYSTEMS    (2-9 systems for level 4, 10 or more for level 5)     Review of Systems   Constitutional: Negative. Negative for activity change and fever. HENT: Negative for congestion, drooling, facial swelling, mouth sores, nosebleeds, sinus pressure, sore throat, trouble swallowing and voice change. Eyes: Negative for pain, discharge, redness and visual disturbance. Respiratory: Negative for cough, choking, chest tightness, shortness of breath, wheezing and stridor.     Cardiovascular: Negative for chest pain, palpitations and leg swelling. Gastrointestinal: Positive for abdominal pain. Negative for blood in stool, constipation, diarrhea and vomiting. Endocrine: Negative for cold intolerance, polyphagia and polyuria. Genitourinary: Negative for dysuria, flank pain, frequency, genital sores and urgency. Musculoskeletal: Negative for back pain, joint swelling, neck pain and neck stiffness. Skin: Negative for pallor and rash. Neurological: Negative for tremors, seizures, syncope, weakness, numbness and headaches. Hematological: Negative for adenopathy. Does not bruise/bleed easily. Psychiatric/Behavioral: Negative for agitation, behavioral problems, hallucinations and sleep disturbance. The patient is nervous/anxious. The patient is not hyperactive. All other systems reviewed and are negative. Except as noted above the remainder of the review of systems was reviewed and negative.        PAST MEDICAL HISTORY     Past Medical History:   Diagnosis Date    Cancer ([facility] [address] 09/2016    IDC Right breast BRCA1/2 neg, ERPR <1% Her 2+ Ki67 70%  multifocal G2 with multifocal G3 DCIS 4.3 cm 8/8 LN + medial margin    Chest wall pain, chronic 7/13/2017    Depression with anxiety 7/13/2017    Endometriosis 7/13/2017    Estrogen receptor negative tumor status 3/28/2017    Overview:  HER-2 potitive    IBS (irritable bowel syndrome)     Marijuana use 7/13/2017    PCOS (polycystic ovarian syndrome) 7/13/2017    Prolonged emergence from general anesthesia 08/2016    trouble staying awake post-op         SURGICAL HISTORY       Past Surgical History:   Procedure Laterality Date    BREAST SURGERY Right 09/22/2016    Lumpectomy and SLN    CHOLECYSTECTOMY N/A 2004    HYSTERECTOMY VAGINAL Bilateral 10/17/2016    LAVH  BILATERAL  SALPINGECTOMY performed by Ko Adams DO at [address] [place] [address] HYSTEROSCOPY  05/16/2016    DR WILLARD    HYSTEROSCOPY  08/22/2016    FRACTIONAL D&C-OPERATIVE LAPAROSCOPY    INSERTION / REMOVAL / REPLACEMENT VENOUS ACCESS CATHETER N/A 10/27/2016    PLACEMENT VENOUS ACCESS DEVICE , PAT DONE 10-23 performed by Harika Teague MD at [address] Right 10/17/2016    INJECTION METHYLENE BLUE RE-EXCISION RIGHT BREAST CANCER SITE, RIGHT SENTINEL NODE BIOPSY performed by Harika Teague MD at [facility] Right 04/04/2017         CURRENT MEDICATIONS       Previous Medications    DICLOFENAC (VOLTAREN) 75 MG EC TABLET        DULOXETINE (CYMBALTA) 30 MG EXTENDED RELEASE CAPSULE    Take 1 capsule by mouth daily for 7 days    DULOXETINE (CYMBALTA) 60 MG EXTENDED RELEASE CAPSULE    Take 1 capsule by mouth daily    LORAZEPAM (ATIVAN) 0.5 MG TABLET    1-2 tablets every 8 hours as needed for anxiety    NUCYNTA 50 MG TABS        TIZANIDINE (ZANAFLEX) 4 MG TABLET           ALLERGIES     Cornejo;  Lansoprazole; Ranitidine hcl; and Zantac [ranitidine hcl]    FAMILY HISTORY       Family History   Problem Relation Age of Onset    Heart Attack Father 50    Arthritis Mother     COPD Mother     Emphysema Mother     Cancer Mother      esophagus    Breast Cancer Sister     Cervical Cancer Other     Breast Cancer Paternal Aunt     Endometrial Cancer Paternal Aunt     Stroke Maternal Grandmother     No Known Problems Maternal Grandfather     No Known Problems Paternal Grandmother     No Known Problems Paternal Grandfather     No Known Problems Sister     No Known Problems Sister     No Known Problems Sister     No Known Problems Sister     No Known Problems Daughter           SOCIAL HISTORY       Social History     Social History    Marital status:      Spouse name: Tenzin Chaidez Number of children: N/A    Years of education: N/A     Occupational History    nurses aide      Social History Main Topics    Smoking status: Former Smoker     Packs/day: 1.00     Years: 4.00     Types: Cigarettes     Start date: 2006     Quit date: 9/1/2010    Smokeless tobacco: Never Used    Alcohol use Yes      Comment: occ    Drug use: Yes     Special: Marijuana      Comment: daily marijuana use    Sexual activity: Yes     Partners: Male     Other Topics Concern    None     Social History Narrative    Lives with  and daughter        2 cats    1 dog    1 hamster    10 chickens        Likes to coupon       SCREENINGS             PHYSICAL EXAM    (up to 7 for level 4, 8 or more for level 5)   ED Triage Vitals   BP Temp Temp Source Pulse Resp SpO2 Height Weight   07/17/17 1258 07/17/17 1258 07/17/17 1258 07/17/17 1258 07/17/17 1258 07/17/17 1258 07/17/17 1258 07/17/17 1258   127/86 98.1 °F (36.7 °C) Oral 82 16 98 % 5' 3\" (1.6 m) 214 lb (97.1 kg)       Physical Exam   Constitutional: She is oriented to person, place, and time. She appears well-developed. HENT:   Head: Normocephalic. Neck: Neck supple. No JVD present. No tracheal deviation present. No thyromegaly present. Cardiovascular: Normal rate and normal heart sounds. Exam reveals no gallop. No murmur heard. Pulmonary/Chest: Breath sounds normal. No respiratory distress. She has no wheezes. Abdominal: Soft. Bowel sounds are normal. She exhibits no distension and no mass. There is tenderness. There is no rebound and no guarding. Attention given to the abdomen patient hasn't no distention no guarding no rebound tenderness and mild diffuse tenderness of abdomen no McBurney's point tenderness no Tian sign no CVA tenderness no masses felt   Musculoskeletal: Normal range of motion. She exhibits no edema or tenderness. Lymphadenopathy:     She has no cervical adenopathy. Neurological: She is alert and oriented to person, place, and time. No cranial nerve deficit. She exhibits normal muscle tone. Skin: Skin is warm. No rash noted. No erythema.    Psychiatric: Her behavior is normal. Thought content normal.       DIAGNOSTIC RESULTS     EKG: All EKG's are interpreted by the Emergency Department Physician who either

## 2017-07-17 NOTE — ED NOTES
[facility] REPORT TO [facility] AT [address] [place] [address] [address]     Vivienne Kay RN  07/17/17 9768

## 2017-07-17 NOTE — ED TRIAGE NOTES
abd pain since intercourse on Friday. Denies n/v/d. No bleeding . Feels short of breath and sharp mid sternal chest pain. Lungs clear with no distress noted. Mom at bedside.

## 2017-07-18 LAB
ANION GAP SERPL CALCULATED.3IONS-SCNC: 10 MEQ/L (ref 7–13)
BUN BLDV-MCNC: 5 MG/DL (ref 6–20)
CALCIUM SERPL-MCNC: 7.6 MG/DL (ref 8.6–10.2)
CHLORIDE BLD-SCNC: 99 MEQ/L (ref 98–107)
CO2: 24 MEQ/L (ref 22–29)
CREAT SERPL-MCNC: 0.32 MG/DL (ref 0.5–0.9)
GFR AFRICAN AMERICAN: >60
GFR NON-AFRICAN AMERICAN: >60
GLUCOSE BLD-MCNC: 107 MG/DL (ref 74–109)
HCT VFR BLD CALC: 38 % (ref 37–47)
HEMOGLOBIN: 12.7 G/DL (ref 12–16)
MCH RBC QN AUTO: 29.8 PG (ref 27–31.3)
MCHC RBC AUTO-ENTMCNC: 33.4 % (ref 33–37)
MCV RBC AUTO: 89.2 FL (ref 82–100)
PDW BLD-RTO: 13.7 % (ref 11.5–14.5)
PLATELET # BLD: 194 K/UL (ref 130–400)
POTASSIUM SERPL-SCNC: 3.6 MEQ/L (ref 3.5–5.1)
RBC # BLD: 4.26 M/UL (ref 4.2–5.4)
SODIUM BLD-SCNC: 133 MEQ/L (ref 132–144)
WBC # BLD: 14.8 K/UL (ref 4.8–10.8)

## 2017-07-18 PROCEDURE — 2580000003 HC RX 258: Performed by: SURGERY

## 2017-07-18 PROCEDURE — 85027 COMPLETE CBC AUTOMATED: CPT

## 2017-07-18 PROCEDURE — 6360000002 HC RX W HCPCS: Performed by: SURGERY

## 2017-07-18 PROCEDURE — 2700000000 HC OXYGEN THERAPY PER DAY

## 2017-07-18 PROCEDURE — 36592 COLLECT BLOOD FROM PICC: CPT

## 2017-07-18 PROCEDURE — 6370000000 HC RX 637 (ALT 250 FOR IP): Performed by: SURGERY

## 2017-07-18 PROCEDURE — 0DQH0ZZ REPAIR CECUM, OPEN APPROACH: ICD-10-PCS | Performed by: SURGERY

## 2017-07-18 PROCEDURE — 80048 BASIC METABOLIC PNL TOTAL CA: CPT

## 2017-07-18 PROCEDURE — 0DTJ0ZZ RESECTION OF APPENDIX, OPEN APPROACH: ICD-10-PCS | Performed by: SURGERY

## 2017-07-18 PROCEDURE — 1210000000 HC MED SURG R&B

## 2017-07-18 PROCEDURE — 0DBH0ZX EXCISION OF CECUM, OPEN APPROACH, DIAGNOSTIC: ICD-10-PCS | Performed by: SURGERY

## 2017-07-18 RX ORDER — DULOXETIN HYDROCHLORIDE 60 MG/1
60 CAPSULE, DELAYED RELEASE ORAL DAILY
Status: DISCONTINUED | OUTPATIENT
Start: 2017-07-18 | End: 2017-07-23 | Stop reason: HOSPADM

## 2017-07-18 RX ORDER — OXYCODONE AND ACETAMINOPHEN 10; 325 MG/1; MG/1
1 TABLET ORAL EVERY 6 HOURS PRN
Status: DISCONTINUED | OUTPATIENT
Start: 2017-07-18 | End: 2017-07-23 | Stop reason: HOSPADM

## 2017-07-18 RX ORDER — LORAZEPAM 0.5 MG/1
0.5 TABLET ORAL EVERY 8 HOURS PRN
Status: DISCONTINUED | OUTPATIENT
Start: 2017-07-18 | End: 2017-07-23 | Stop reason: HOSPADM

## 2017-07-18 RX ADMIN — HYDROMORPHONE HYDROCHLORIDE 1 MG: 1 INJECTION, SOLUTION INTRAMUSCULAR; INTRAVENOUS; SUBCUTANEOUS at 06:09

## 2017-07-18 RX ADMIN — KETOROLAC TROMETHAMINE 30 MG: 30 INJECTION, SOLUTION INTRAMUSCULAR at 20:22

## 2017-07-18 RX ADMIN — KETOROLAC TROMETHAMINE 30 MG: 30 INJECTION, SOLUTION INTRAMUSCULAR at 14:00

## 2017-07-18 RX ADMIN — SODIUM CHLORIDE: 9 INJECTION, SOLUTION INTRAVENOUS at 21:42

## 2017-07-18 RX ADMIN — HYDROMORPHONE HYDROCHLORIDE 1 MG: 1 INJECTION, SOLUTION INTRAMUSCULAR; INTRAVENOUS; SUBCUTANEOUS at 09:04

## 2017-07-18 RX ADMIN — KETOROLAC TROMETHAMINE 30 MG: 30 INJECTION, SOLUTION INTRAMUSCULAR at 01:46

## 2017-07-18 RX ADMIN — SODIUM CHLORIDE: 9 INJECTION, SOLUTION INTRAVENOUS at 06:08

## 2017-07-18 RX ADMIN — PIPERACILLIN SODIUM AND TAZOBACTAM SODIUM 3.38 G: 3; .375 INJECTION, POWDER, LYOPHILIZED, FOR SOLUTION INTRAVENOUS at 11:54

## 2017-07-18 RX ADMIN — HYDROMORPHONE HYDROCHLORIDE 1 MG: 1 INJECTION, SOLUTION INTRAMUSCULAR; INTRAVENOUS; SUBCUTANEOUS at 18:03

## 2017-07-18 RX ADMIN — HYDROMORPHONE HYDROCHLORIDE 1 MG: 1 INJECTION, SOLUTION INTRAMUSCULAR; INTRAVENOUS; SUBCUTANEOUS at 02:42

## 2017-07-18 RX ADMIN — HYDROMORPHONE HYDROCHLORIDE 1 MG: 1 INJECTION, SOLUTION INTRAMUSCULAR; INTRAVENOUS; SUBCUTANEOUS at 11:54

## 2017-07-18 RX ADMIN — HYDROMORPHONE HYDROCHLORIDE 1 MG: 1 INJECTION, SOLUTION INTRAMUSCULAR; INTRAVENOUS; SUBCUTANEOUS at 15:07

## 2017-07-18 RX ADMIN — ONDANSETRON 4 MG: 2 INJECTION INTRAMUSCULAR; INTRAVENOUS at 08:19

## 2017-07-18 RX ADMIN — HYDROMORPHONE HYDROCHLORIDE 1 MG: 1 INJECTION, SOLUTION INTRAMUSCULAR; INTRAVENOUS; SUBCUTANEOUS at 21:42

## 2017-07-18 RX ADMIN — OXYCODONE HYDROCHLORIDE AND ACETAMINOPHEN 1 TABLET: 10; 325 TABLET ORAL at 20:30

## 2017-07-18 RX ADMIN — PIPERACILLIN SODIUM AND TAZOBACTAM SODIUM 3.38 G: 3; .375 INJECTION, POWDER, LYOPHILIZED, FOR SOLUTION INTRAVENOUS at 18:04

## 2017-07-18 RX ADMIN — PIPERACILLIN SODIUM AND TAZOBACTAM SODIUM 3.38 G: 3; .375 INJECTION, POWDER, LYOPHILIZED, FOR SOLUTION INTRAVENOUS at 23:41

## 2017-07-18 RX ADMIN — PIPERACILLIN SODIUM AND TAZOBACTAM SODIUM 3.38 G: 3; .375 INJECTION, POWDER, LYOPHILIZED, FOR SOLUTION INTRAVENOUS at 05:30

## 2017-07-18 RX ADMIN — ENOXAPARIN SODIUM 40 MG: 40 INJECTION SUBCUTANEOUS at 08:20

## 2017-07-18 RX ADMIN — ONDANSETRON 4 MG: 2 INJECTION INTRAMUSCULAR; INTRAVENOUS at 22:05

## 2017-07-18 RX ADMIN — KETOROLAC TROMETHAMINE 30 MG: 30 INJECTION, SOLUTION INTRAMUSCULAR at 08:20

## 2017-07-18 ASSESSMENT — PAIN SCALES - GENERAL
PAINLEVEL_OUTOF10: 10
PAINLEVEL_OUTOF10: 9
PAINLEVEL_OUTOF10: 10
PAINLEVEL_OUTOF10: 7
PAINLEVEL_OUTOF10: 10
PAINLEVEL_OUTOF10: 10
PAINLEVEL_OUTOF10: 9
PAINLEVEL_OUTOF10: 10
PAINLEVEL_OUTOF10: 8

## 2017-07-18 ASSESSMENT — PAIN DESCRIPTION - LOCATION: LOCATION: ABDOMEN

## 2017-07-18 ASSESSMENT — PAIN DESCRIPTION - FREQUENCY: FREQUENCY: CONTINUOUS

## 2017-07-18 ASSESSMENT — PAIN DESCRIPTION - DESCRIPTORS: DESCRIPTORS: ACHING

## 2017-07-18 ASSESSMENT — PAIN DESCRIPTION - PAIN TYPE: TYPE: ACUTE PAIN

## 2017-07-18 ASSESSMENT — PAIN DESCRIPTION - PROGRESSION: CLINICAL_PROGRESSION: NOT CHANGED

## 2017-07-18 ASSESSMENT — PAIN DESCRIPTION - ONSET: ONSET: ON-GOING

## 2017-07-18 ASSESSMENT — PAIN DESCRIPTION - ORIENTATION: ORIENTATION: RIGHT;LEFT

## 2017-07-19 PROCEDURE — 2580000003 HC RX 258: Performed by: SURGERY

## 2017-07-19 PROCEDURE — 6370000000 HC RX 637 (ALT 250 FOR IP): Performed by: SURGERY

## 2017-07-19 PROCEDURE — 6360000002 HC RX W HCPCS: Performed by: SURGERY

## 2017-07-19 PROCEDURE — 1210000000 HC MED SURG R&B

## 2017-07-19 RX ADMIN — HYDROMORPHONE HYDROCHLORIDE 1 MG: 1 INJECTION, SOLUTION INTRAMUSCULAR; INTRAVENOUS; SUBCUTANEOUS at 01:34

## 2017-07-19 RX ADMIN — PIPERACILLIN SODIUM AND TAZOBACTAM SODIUM 3.38 G: 3; .375 INJECTION, POWDER, LYOPHILIZED, FOR SOLUTION INTRAVENOUS at 23:09

## 2017-07-19 RX ADMIN — ONDANSETRON 4 MG: 2 INJECTION INTRAMUSCULAR; INTRAVENOUS at 04:42

## 2017-07-19 RX ADMIN — HYDROMORPHONE HYDROCHLORIDE 1 MG: 1 INJECTION, SOLUTION INTRAMUSCULAR; INTRAVENOUS; SUBCUTANEOUS at 04:37

## 2017-07-19 RX ADMIN — HYDROMORPHONE HYDROCHLORIDE 1 MG: 1 INJECTION, SOLUTION INTRAMUSCULAR; INTRAVENOUS; SUBCUTANEOUS at 20:14

## 2017-07-19 RX ADMIN — KETOROLAC TROMETHAMINE 30 MG: 30 INJECTION, SOLUTION INTRAMUSCULAR at 01:42

## 2017-07-19 RX ADMIN — OXYCODONE HYDROCHLORIDE AND ACETAMINOPHEN 1 TABLET: 10; 325 TABLET ORAL at 23:08

## 2017-07-19 RX ADMIN — PIPERACILLIN SODIUM AND TAZOBACTAM SODIUM 3.38 G: 3; .375 INJECTION, POWDER, LYOPHILIZED, FOR SOLUTION INTRAVENOUS at 18:00

## 2017-07-19 RX ADMIN — PIPERACILLIN SODIUM AND TAZOBACTAM SODIUM 3.38 G: 3; .375 INJECTION, POWDER, LYOPHILIZED, FOR SOLUTION INTRAVENOUS at 05:45

## 2017-07-19 RX ADMIN — SODIUM CHLORIDE: 9 INJECTION, SOLUTION INTRAVENOUS at 16:55

## 2017-07-19 RX ADMIN — PROCHLORPERAZINE EDISYLATE 10 MG: 5 INJECTION INTRAMUSCULAR; INTRAVENOUS at 20:14

## 2017-07-19 RX ADMIN — PROCHLORPERAZINE EDISYLATE 10 MG: 5 INJECTION INTRAMUSCULAR; INTRAVENOUS at 11:07

## 2017-07-19 RX ADMIN — PIPERACILLIN SODIUM AND TAZOBACTAM SODIUM 3.38 G: 3; .375 INJECTION, POWDER, LYOPHILIZED, FOR SOLUTION INTRAVENOUS at 12:26

## 2017-07-19 RX ADMIN — OXYCODONE HYDROCHLORIDE AND ACETAMINOPHEN 1 TABLET: 10; 325 TABLET ORAL at 09:59

## 2017-07-19 RX ADMIN — KETOROLAC TROMETHAMINE 30 MG: 30 INJECTION, SOLUTION INTRAMUSCULAR at 08:42

## 2017-07-19 RX ADMIN — HYDROMORPHONE HYDROCHLORIDE 0.5 MG: 1 INJECTION, SOLUTION INTRAMUSCULAR; INTRAVENOUS; SUBCUTANEOUS at 13:53

## 2017-07-19 RX ADMIN — ENOXAPARIN SODIUM 40 MG: 40 INJECTION SUBCUTANEOUS at 08:41

## 2017-07-19 ASSESSMENT — PAIN DESCRIPTION - LOCATION
LOCATION: ABDOMEN

## 2017-07-19 ASSESSMENT — PAIN SCALES - GENERAL
PAINLEVEL_OUTOF10: 8
PAINLEVEL_OUTOF10: 6
PAINLEVEL_OUTOF10: 8
PAINLEVEL_OUTOF10: 6
PAINLEVEL_OUTOF10: 8
PAINLEVEL_OUTOF10: 9
PAINLEVEL_OUTOF10: 8

## 2017-07-19 ASSESSMENT — PAIN DESCRIPTION - PAIN TYPE
TYPE: ACUTE PAIN

## 2017-07-20 PROCEDURE — 2580000003 HC RX 258: Performed by: SURGERY

## 2017-07-20 PROCEDURE — 6360000002 HC RX W HCPCS: Performed by: SURGERY

## 2017-07-20 PROCEDURE — 1210000000 HC MED SURG R&B

## 2017-07-20 PROCEDURE — 6370000000 HC RX 637 (ALT 250 FOR IP): Performed by: SURGERY

## 2017-07-20 PROCEDURE — 2700000000 HC OXYGEN THERAPY PER DAY

## 2017-07-20 RX ORDER — OXYCODONE HYDROCHLORIDE AND ACETAMINOPHEN 5; 325 MG/1; MG/1
1 TABLET ORAL ONCE
Status: COMPLETED | OUTPATIENT
Start: 2017-07-20 | End: 2017-07-20

## 2017-07-20 RX ADMIN — ONDANSETRON 4 MG: 2 INJECTION INTRAMUSCULAR; INTRAVENOUS at 18:39

## 2017-07-20 RX ADMIN — PIPERACILLIN SODIUM AND TAZOBACTAM SODIUM 3.38 G: 3; .375 INJECTION, POWDER, LYOPHILIZED, FOR SOLUTION INTRAVENOUS at 05:04

## 2017-07-20 RX ADMIN — PROCHLORPERAZINE EDISYLATE 10 MG: 5 INJECTION INTRAMUSCULAR; INTRAVENOUS at 05:06

## 2017-07-20 RX ADMIN — HYDROMORPHONE HYDROCHLORIDE 1 MG: 1 INJECTION, SOLUTION INTRAMUSCULAR; INTRAVENOUS; SUBCUTANEOUS at 01:48

## 2017-07-20 RX ADMIN — PIPERACILLIN SODIUM AND TAZOBACTAM SODIUM 3.38 G: 3; .375 INJECTION, POWDER, LYOPHILIZED, FOR SOLUTION INTRAVENOUS at 23:51

## 2017-07-20 RX ADMIN — HYDROMORPHONE HYDROCHLORIDE 1 MG: 1 INJECTION, SOLUTION INTRAMUSCULAR; INTRAVENOUS; SUBCUTANEOUS at 08:05

## 2017-07-20 RX ADMIN — OXYCODONE HYDROCHLORIDE AND ACETAMINOPHEN 1 TABLET: 10; 325 TABLET ORAL at 11:28

## 2017-07-20 RX ADMIN — OXYCODONE HYDROCHLORIDE AND ACETAMINOPHEN 1 TABLET: 10; 325 TABLET ORAL at 21:23

## 2017-07-20 RX ADMIN — ONDANSETRON 4 MG: 2 INJECTION INTRAMUSCULAR; INTRAVENOUS at 12:55

## 2017-07-20 RX ADMIN — ONDANSETRON 4 MG: 2 INJECTION INTRAMUSCULAR; INTRAVENOUS at 23:51

## 2017-07-20 RX ADMIN — PIPERACILLIN SODIUM AND TAZOBACTAM SODIUM 3.38 G: 3; .375 INJECTION, POWDER, LYOPHILIZED, FOR SOLUTION INTRAVENOUS at 17:45

## 2017-07-20 RX ADMIN — HYDROMORPHONE HYDROCHLORIDE 1 MG: 1 INJECTION, SOLUTION INTRAMUSCULAR; INTRAVENOUS; SUBCUTANEOUS at 05:06

## 2017-07-20 RX ADMIN — PIPERACILLIN SODIUM AND TAZOBACTAM SODIUM 3.38 G: 3; .375 INJECTION, POWDER, LYOPHILIZED, FOR SOLUTION INTRAVENOUS at 11:29

## 2017-07-20 RX ADMIN — OXYCODONE HYDROCHLORIDE AND ACETAMINOPHEN 1 TABLET: 5; 325 TABLET ORAL at 15:38

## 2017-07-20 RX ADMIN — ENOXAPARIN SODIUM 40 MG: 40 INJECTION SUBCUTANEOUS at 08:03

## 2017-07-20 ASSESSMENT — PAIN SCALES - GENERAL
PAINLEVEL_OUTOF10: 8
PAINLEVEL_OUTOF10: 7
PAINLEVEL_OUTOF10: 5
PAINLEVEL_OUTOF10: 9
PAINLEVEL_OUTOF10: 10
PAINLEVEL_OUTOF10: 8
PAINLEVEL_OUTOF10: 9

## 2017-07-21 PROCEDURE — G8978 MOBILITY CURRENT STATUS: HCPCS

## 2017-07-21 PROCEDURE — 6370000000 HC RX 637 (ALT 250 FOR IP): Performed by: SURGERY

## 2017-07-21 PROCEDURE — G8979 MOBILITY GOAL STATUS: HCPCS

## 2017-07-21 PROCEDURE — 2580000003 HC RX 258: Performed by: SURGERY

## 2017-07-21 PROCEDURE — 6360000002 HC RX W HCPCS: Performed by: SURGERY

## 2017-07-21 PROCEDURE — 1210000000 HC MED SURG R&B

## 2017-07-21 PROCEDURE — G8980 MOBILITY D/C STATUS: HCPCS

## 2017-07-21 PROCEDURE — 97161 PT EVAL LOW COMPLEX 20 MIN: CPT

## 2017-07-21 RX ORDER — MAGNESIUM HYDROXIDE/ALUMINUM HYDROXICE/SIMETHICONE 120; 1200; 1200 MG/30ML; MG/30ML; MG/30ML
30 SUSPENSION ORAL EVERY 4 HOURS PRN
Status: DISCONTINUED | OUTPATIENT
Start: 2017-07-21 | End: 2017-07-23 | Stop reason: HOSPADM

## 2017-07-21 RX ORDER — PROCHLORPERAZINE MALEATE 10 MG
10 TABLET ORAL EVERY 8 HOURS PRN
Qty: 15 TABLET | Refills: 1 | Status: SHIPPED | OUTPATIENT
Start: 2017-07-21 | End: 2021-11-11 | Stop reason: CLARIF

## 2017-07-21 RX ORDER — OXYCODONE AND ACETAMINOPHEN 10; 325 MG/1; MG/1
1 TABLET ORAL EVERY 6 HOURS PRN
Qty: 30 TABLET | Refills: 0 | Status: SHIPPED | OUTPATIENT
Start: 2017-07-21 | End: 2017-11-16 | Stop reason: SDUPTHER

## 2017-07-21 RX ADMIN — ENOXAPARIN SODIUM 40 MG: 40 INJECTION SUBCUTANEOUS at 08:14

## 2017-07-21 RX ADMIN — OXYCODONE HYDROCHLORIDE AND ACETAMINOPHEN 1 TABLET: 10; 325 TABLET ORAL at 13:31

## 2017-07-21 RX ADMIN — HYDROMORPHONE HYDROCHLORIDE 1 MG: 1 INJECTION, SOLUTION INTRAMUSCULAR; INTRAVENOUS; SUBCUTANEOUS at 18:23

## 2017-07-21 RX ADMIN — PIPERACILLIN SODIUM AND TAZOBACTAM SODIUM 3.38 G: 3; .375 INJECTION, POWDER, LYOPHILIZED, FOR SOLUTION INTRAVENOUS at 18:23

## 2017-07-21 RX ADMIN — PIPERACILLIN SODIUM AND TAZOBACTAM SODIUM 3.38 G: 3; .375 INJECTION, POWDER, LYOPHILIZED, FOR SOLUTION INTRAVENOUS at 06:54

## 2017-07-21 RX ADMIN — ONDANSETRON 4 MG: 2 INJECTION INTRAMUSCULAR; INTRAVENOUS at 21:56

## 2017-07-21 RX ADMIN — OXYCODONE HYDROCHLORIDE AND ACETAMINOPHEN 1 TABLET: 10; 325 TABLET ORAL at 03:36

## 2017-07-21 RX ADMIN — ONDANSETRON 4 MG: 2 INJECTION INTRAMUSCULAR; INTRAVENOUS at 08:14

## 2017-07-21 RX ADMIN — HYDROMORPHONE HYDROCHLORIDE 1 MG: 1 INJECTION, SOLUTION INTRAMUSCULAR; INTRAVENOUS; SUBCUTANEOUS at 09:50

## 2017-07-21 RX ADMIN — PROCHLORPERAZINE EDISYLATE 10 MG: 5 INJECTION INTRAMUSCULAR; INTRAVENOUS at 12:51

## 2017-07-21 RX ADMIN — PIPERACILLIN SODIUM AND TAZOBACTAM SODIUM 3.38 G: 3; .375 INJECTION, POWDER, LYOPHILIZED, FOR SOLUTION INTRAVENOUS at 12:53

## 2017-07-21 RX ADMIN — ALUMINUM HYDROXIDE, MAGNESIUM HYDROXIDE, AND SIMETHICONE 30 ML: 200; 200; 20 SUSPENSION ORAL at 22:51

## 2017-07-21 RX ADMIN — OXYCODONE HYDROCHLORIDE AND ACETAMINOPHEN 1 TABLET: 10; 325 TABLET ORAL at 22:51

## 2017-07-21 ASSESSMENT — PAIN SCALES - GENERAL
PAINLEVEL_OUTOF10: 10
PAINLEVEL_OUTOF10: 8
PAINLEVEL_OUTOF10: 8
PAINLEVEL_OUTOF10: 7
PAINLEVEL_OUTOF10: 0
PAINLEVEL_OUTOF10: 10

## 2017-07-22 ENCOUNTER — APPOINTMENT (OUTPATIENT)
Dept: GENERAL RADIOLOGY | Age: 27
DRG: 331 | End: 2017-07-22
Attending: SURGERY
Payer: COMMERCIAL

## 2017-07-22 PROCEDURE — 6360000002 HC RX W HCPCS: Performed by: SURGERY

## 2017-07-22 PROCEDURE — 74020 XR ABDOMEN STANDARD: CPT

## 2017-07-22 PROCEDURE — 2580000003 HC RX 258: Performed by: SURGERY

## 2017-07-22 PROCEDURE — 6370000000 HC RX 637 (ALT 250 FOR IP): Performed by: SURGERY

## 2017-07-22 PROCEDURE — 1210000000 HC MED SURG R&B

## 2017-07-22 PROCEDURE — 99024 POSTOP FOLLOW-UP VISIT: CPT | Performed by: SURGERY

## 2017-07-22 RX ORDER — BISACODYL 10 MG
10 SUPPOSITORY, RECTAL RECTAL DAILY PRN
Status: DISCONTINUED | OUTPATIENT
Start: 2017-07-22 | End: 2017-07-23 | Stop reason: HOSPADM

## 2017-07-22 RX ORDER — METOCLOPRAMIDE HYDROCHLORIDE 5 MG/ML
5 INJECTION INTRAMUSCULAR; INTRAVENOUS EVERY 8 HOURS
Status: DISCONTINUED | OUTPATIENT
Start: 2017-07-22 | End: 2017-07-23 | Stop reason: HOSPADM

## 2017-07-22 RX ORDER — BISACODYL 10 MG
10 SUPPOSITORY, RECTAL RECTAL DAILY
Status: DISCONTINUED | OUTPATIENT
Start: 2017-07-22 | End: 2017-07-22

## 2017-07-22 RX ADMIN — PROCHLORPERAZINE EDISYLATE 10 MG: 5 INJECTION INTRAMUSCULAR; INTRAVENOUS at 12:29

## 2017-07-22 RX ADMIN — ONDANSETRON 4 MG: 2 INJECTION INTRAMUSCULAR; INTRAVENOUS at 05:18

## 2017-07-22 RX ADMIN — OXYCODONE HYDROCHLORIDE AND ACETAMINOPHEN 1 TABLET: 10; 325 TABLET ORAL at 17:29

## 2017-07-22 RX ADMIN — PIPERACILLIN SODIUM AND TAZOBACTAM SODIUM 3.38 G: 3; .375 INJECTION, POWDER, LYOPHILIZED, FOR SOLUTION INTRAVENOUS at 17:29

## 2017-07-22 RX ADMIN — OXYCODONE HYDROCHLORIDE AND ACETAMINOPHEN 1 TABLET: 10; 325 TABLET ORAL at 23:18

## 2017-07-22 RX ADMIN — NYSTATIN 500000 UNITS: 100000 SUSPENSION ORAL at 23:18

## 2017-07-22 RX ADMIN — PIPERACILLIN SODIUM AND TAZOBACTAM SODIUM 3.38 G: 3; .375 INJECTION, POWDER, LYOPHILIZED, FOR SOLUTION INTRAVENOUS at 00:29

## 2017-07-22 RX ADMIN — OXYCODONE HYDROCHLORIDE AND ACETAMINOPHEN 1 TABLET: 10; 325 TABLET ORAL at 11:33

## 2017-07-22 RX ADMIN — METOCLOPRAMIDE 5 MG: 5 INJECTION, SOLUTION INTRAMUSCULAR; INTRAVENOUS at 15:57

## 2017-07-22 RX ADMIN — METOCLOPRAMIDE 5 MG: 5 INJECTION, SOLUTION INTRAMUSCULAR; INTRAVENOUS at 23:18

## 2017-07-22 RX ADMIN — PIPERACILLIN SODIUM AND TAZOBACTAM SODIUM 3.38 G: 3; .375 INJECTION, POWDER, LYOPHILIZED, FOR SOLUTION INTRAVENOUS at 12:15

## 2017-07-22 RX ADMIN — OXYCODONE HYDROCHLORIDE AND ACETAMINOPHEN 1 TABLET: 10; 325 TABLET ORAL at 05:15

## 2017-07-22 RX ADMIN — PIPERACILLIN SODIUM AND TAZOBACTAM SODIUM 3.38 G: 3; .375 INJECTION, POWDER, LYOPHILIZED, FOR SOLUTION INTRAVENOUS at 05:18

## 2017-07-22 ASSESSMENT — PAIN SCALES - GENERAL
PAINLEVEL_OUTOF10: 10
PAINLEVEL_OUTOF10: 10
PAINLEVEL_OUTOF10: 8
PAINLEVEL_OUTOF10: 8

## 2017-07-23 VITALS
BODY MASS INDEX: 37.92 KG/M2 | DIASTOLIC BLOOD PRESSURE: 59 MMHG | HEIGHT: 63 IN | OXYGEN SATURATION: 97 % | HEART RATE: 76 BPM | RESPIRATION RATE: 18 BRPM | WEIGHT: 214 LBS | SYSTOLIC BLOOD PRESSURE: 117 MMHG | TEMPERATURE: 98.1 F

## 2017-07-23 PROCEDURE — 93005 ELECTROCARDIOGRAM TRACING: CPT

## 2017-07-23 PROCEDURE — 6370000000 HC RX 637 (ALT 250 FOR IP): Performed by: SURGERY

## 2017-07-23 PROCEDURE — 2580000003 HC RX 258: Performed by: SURGERY

## 2017-07-23 PROCEDURE — 99024 POSTOP FOLLOW-UP VISIT: CPT | Performed by: SURGERY

## 2017-07-23 PROCEDURE — 6360000002 HC RX W HCPCS

## 2017-07-23 PROCEDURE — 6360000002 HC RX W HCPCS: Performed by: SURGERY

## 2017-07-23 RX ORDER — HEPARIN SODIUM (PORCINE) LOCK FLUSH IV SOLN 100 UNIT/ML 100 UNIT/ML
500 SOLUTION INTRAVENOUS PRN
Status: DISCONTINUED | OUTPATIENT
Start: 2017-07-23 | End: 2017-07-23 | Stop reason: HOSPADM

## 2017-07-23 RX ORDER — HEPARIN SODIUM (PORCINE) LOCK FLUSH IV SOLN 100 UNIT/ML 100 UNIT/ML
SOLUTION INTRAVENOUS
Status: COMPLETED
Start: 2017-07-23 | End: 2017-07-23

## 2017-07-23 RX ORDER — HEPARIN SODIUM (PORCINE) LOCK FLUSH IV SOLN 100 UNIT/ML 100 UNIT/ML
100 SOLUTION INTRAVENOUS PRN
Status: DISCONTINUED | OUTPATIENT
Start: 2017-07-23 | End: 2017-07-23

## 2017-07-23 RX ADMIN — PIPERACILLIN SODIUM AND TAZOBACTAM SODIUM 3.38 G: 3; .375 INJECTION, POWDER, LYOPHILIZED, FOR SOLUTION INTRAVENOUS at 07:40

## 2017-07-23 RX ADMIN — OXYCODONE HYDROCHLORIDE AND ACETAMINOPHEN 1 TABLET: 10; 325 TABLET ORAL at 05:35

## 2017-07-23 RX ADMIN — OXYCODONE HYDROCHLORIDE AND ACETAMINOPHEN 1 TABLET: 10; 325 TABLET ORAL at 11:31

## 2017-07-23 RX ADMIN — PIPERACILLIN SODIUM AND TAZOBACTAM SODIUM 3.38 G: 3; .375 INJECTION, POWDER, LYOPHILIZED, FOR SOLUTION INTRAVENOUS at 00:20

## 2017-07-23 RX ADMIN — ONDANSETRON 4 MG: 2 INJECTION INTRAMUSCULAR; INTRAVENOUS at 01:07

## 2017-07-23 RX ADMIN — PROCHLORPERAZINE EDISYLATE 10 MG: 5 INJECTION INTRAMUSCULAR; INTRAVENOUS at 08:16

## 2017-07-23 RX ADMIN — NYSTATIN 500000 UNITS: 100000 SUSPENSION ORAL at 08:16

## 2017-07-23 RX ADMIN — METOCLOPRAMIDE 5 MG: 5 INJECTION, SOLUTION INTRAMUSCULAR; INTRAVENOUS at 07:40

## 2017-07-23 RX ADMIN — HEPARIN 500 UNITS: 100 SYRINGE at 11:33

## 2017-07-23 RX ADMIN — HEPARIN SODIUM (PORCINE) LOCK FLUSH IV SOLN 100 UNIT/ML 500 UNITS: 100 SOLUTION at 11:33

## 2017-07-23 ASSESSMENT — PAIN SCALES - GENERAL
PAINLEVEL_OUTOF10: 9
PAINLEVEL_OUTOF10: 10

## 2017-07-25 LAB
EKG ATRIAL RATE: 96 BPM
EKG P AXIS: 63 DEGREES
EKG P-R INTERVAL: 148 MS
EKG Q-T INTERVAL: 368 MS
EKG QRS DURATION: 90 MS
EKG QTC CALCULATION (BAZETT): 464 MS
EKG R AXIS: 74 DEGREES
EKG T AXIS: 35 DEGREES
EKG VENTRICULAR RATE: 96 BPM

## 2017-07-26 ENCOUNTER — TELEPHONE (OUTPATIENT)
Dept: FAMILY MEDICINE CLINIC | Age: 27
End: 2017-07-26

## 2017-07-28 ENCOUNTER — OFFICE VISIT (OUTPATIENT)
Dept: SURGERY | Age: 27
End: 2017-07-28

## 2017-07-28 VITALS
DIASTOLIC BLOOD PRESSURE: 80 MMHG | BODY MASS INDEX: 36.68 KG/M2 | SYSTOLIC BLOOD PRESSURE: 122 MMHG | WEIGHT: 207 LBS | HEIGHT: 63 IN | TEMPERATURE: 97.4 F

## 2017-07-28 DIAGNOSIS — Z09 SURGERY FOLLOW-UP: ICD-10-CM

## 2017-07-28 DIAGNOSIS — G89.18 POST-OP PAIN: Primary | ICD-10-CM

## 2017-07-28 PROBLEM — R19.8 PERFORATED VISCUS: Status: RESOLVED | Noted: 2017-07-17 | Resolved: 2017-07-28

## 2017-07-28 PROBLEM — R93.5 ABNORMAL CT OF THE ABDOMEN: Status: RESOLVED | Noted: 2017-07-17 | Resolved: 2017-07-28

## 2017-07-28 PROCEDURE — 99024 POSTOP FOLLOW-UP VISIT: CPT | Performed by: SURGERY

## 2017-07-28 RX ORDER — OXYCODONE AND ACETAMINOPHEN 10; 325 MG/1; MG/1
1 TABLET ORAL EVERY 6 HOURS PRN
Qty: 30 TABLET | Refills: 0 | Status: SHIPPED | OUTPATIENT
Start: 2017-07-28 | End: 2017-08-02

## 2017-08-02 ENCOUNTER — OFFICE VISIT (OUTPATIENT)
Dept: SURGERY | Age: 27
End: 2017-08-02

## 2017-08-02 VITALS
HEIGHT: 62 IN | TEMPERATURE: 97.3 F | BODY MASS INDEX: 38.46 KG/M2 | SYSTOLIC BLOOD PRESSURE: 118 MMHG | WEIGHT: 209 LBS | DIASTOLIC BLOOD PRESSURE: 82 MMHG

## 2017-08-02 DIAGNOSIS — Z09 SURGERY FOLLOW-UP: Primary | ICD-10-CM

## 2017-08-02 PROCEDURE — 99024 POSTOP FOLLOW-UP VISIT: CPT | Performed by: SURGERY

## 2017-08-25 ENCOUNTER — CLINICAL DOCUMENTATION (OUTPATIENT)
Dept: PHYSICAL THERAPY | Age: 27
End: 2017-08-25

## 2017-09-27 ENCOUNTER — OFFICE VISIT (OUTPATIENT)
Dept: SURGERY | Age: 27
End: 2017-09-27

## 2017-09-27 VITALS
BODY MASS INDEX: 40.48 KG/M2 | WEIGHT: 220 LBS | DIASTOLIC BLOOD PRESSURE: 76 MMHG | TEMPERATURE: 98.2 F | HEIGHT: 62 IN | SYSTOLIC BLOOD PRESSURE: 120 MMHG

## 2017-09-27 DIAGNOSIS — S21.101S: Primary | ICD-10-CM

## 2017-09-27 DIAGNOSIS — R10.9 ABDOMINAL PAIN, UNSPECIFIED LOCATION: ICD-10-CM

## 2017-09-27 PROCEDURE — 99024 POSTOP FOLLOW-UP VISIT: CPT | Performed by: SURGERY

## 2017-10-06 ENCOUNTER — HOSPITAL ENCOUNTER (OUTPATIENT)
Dept: CT IMAGING | Age: 27
Discharge: HOME | End: 2017-10-06
Payer: COMMERCIAL

## 2017-10-06 DIAGNOSIS — R10.9 ABDOMINAL PAIN, UNSPECIFIED LOCATION: ICD-10-CM

## 2017-10-06 PROCEDURE — 6360000004 HC RX CONTRAST MEDICATION: Performed by: RADIOLOGY

## 2017-10-06 PROCEDURE — 2500000003 HC RX 250 WO HCPCS: Performed by: RADIOLOGY

## 2017-10-06 PROCEDURE — 74177 CT ABD & PELVIS W/CONTRAST: CPT

## 2017-10-06 RX ADMIN — BARIUM SULFATE 450 ML: 21 SUSPENSION ORAL at 12:12

## 2017-10-06 RX ADMIN — IOVERSOL 100 ML: 678 INJECTION INTRA-ARTERIAL; INTRAVENOUS at 12:11

## 2017-11-01 ENCOUNTER — HOSPITAL ENCOUNTER (OUTPATIENT)
Dept: WOUND CARE | Age: 27
Discharge: HOME | End: 2017-11-01
Payer: COMMERCIAL

## 2017-11-01 VITALS
SYSTOLIC BLOOD PRESSURE: 143 MMHG | BODY MASS INDEX: 38.98 KG/M2 | TEMPERATURE: 96.7 F | DIASTOLIC BLOOD PRESSURE: 79 MMHG | HEART RATE: 88 BPM | HEIGHT: 63 IN | WEIGHT: 220 LBS | RESPIRATION RATE: 20 BRPM

## 2017-11-01 DIAGNOSIS — S21.001A WOUND OF RIGHT BREAST, INITIAL ENCOUNTER: ICD-10-CM

## 2017-11-01 PROCEDURE — 99213 OFFICE O/P EST LOW 20 MIN: CPT

## 2017-11-01 PROCEDURE — 87205 SMEAR GRAM STAIN: CPT

## 2017-11-01 PROCEDURE — 87075 CULTR BACTERIA EXCEPT BLOOD: CPT

## 2017-11-01 PROCEDURE — 11042 DBRDMT SUBQ TIS 1ST 20SQCM/<: CPT

## 2017-11-01 PROCEDURE — 87070 CULTURE OTHR SPECIMN AEROBIC: CPT

## 2017-11-01 ASSESSMENT — PAIN DESCRIPTION - LOCATION: LOCATION: BREAST;CHEST

## 2017-11-01 ASSESSMENT — PAIN DESCRIPTION - PAIN TYPE: TYPE: ACUTE PAIN

## 2017-11-01 ASSESSMENT — PAIN DESCRIPTION - ORIENTATION: ORIENTATION: RIGHT

## 2017-11-01 ASSESSMENT — PAIN SCALES - GENERAL: PAINLEVEL_OUTOF10: 7

## 2017-11-01 NOTE — PROGRESS NOTES
DO Nilesh at [facility] [facility] HYSTEROSCOPY  05/16/2016    DR Sanjay Barrientos    HYSTEROSCOPY  08/22/2016    FRACTIONAL D&C-OPERATIVE LAPAROSCOPY    INSERTION / REMOVAL / REPLACEMENT VENOUS ACCESS CATHETER N/A 10/27/2016    PLACEMENT VENOUS ACCESS DEVICE , PAT DONE 10-23 performed by Osvaldo Hou MD at [address] N/A 7/17/2017    MASTECTOMY Right 10/17/2016    INJECTION METHYLENE BLUE RE-EXCISION RIGHT BREAST CANCER SITE, RIGHT SENTINEL NODE BIOPSY performed by Osvaldo Hou MD at [address] [facility], [address] [place] [address] Right 04/04/2017    OTHER SURGICAL HISTORY Right 08/15/2017    EXCISION OF CHEST WALL MASS (DR POLANCO @ [facility])       FAMILY HISTORY    Family History   Problem Relation Age of Onset    Heart Attack Father 50   [facility] Arthritis Mother     COPD Mother     Emphysema Mother     Cancer Mother      esophagus    Breast Cancer Sister     Cervical Cancer Other     Breast Cancer Paternal Aunt     Endometrial Cancer Paternal Aunt     Stroke Maternal Grandmother     No Known Problems Maternal Grandfather     No Known Problems Paternal Grandmother     No Known Problems Paternal Grandfather     No Known Problems Sister     No Known Problems Sister     No Known Problems Sister     No Known Problems Sister     No Known Problems Daughter        SOCIAL HISTORY    Social History   Substance Use Topics    Smoking status: Former Smoker     Packs/day: 1.00     Years: 4.00     Types: Cigarettes     Start date: 2006     Quit date: 9/1/2010    Smokeless tobacco: Never Used    Alcohol use Yes      Comment: occ       ALLERGIES    Allergies   Allergen Reactions    Cherry Hives    Lansoprazole     Zantac [Ranitidine Hcl] Other (See Comments)     seizure       MEDICATIONS    Current Outpatient Prescriptions on File Prior to Encounter   Medication Sig Dispense Refill    oxyCODONE-acetaminophen (PERCOCET)  MG per tablet Take 1 tablet by mouth every 6 hours as needed for Pain .  30 tablet 0 MD    Consent obtained:  Yes    Time out taken:  Yes    Pain Control: hurracaine spray      Debridement:Excisional Debridement    Using scissors and forceps the wound(s)/ulcer(s) was/were sharply debrided down through and including the removal of subcutaneous tissue. Devitalized Tissue Debrided:  fibrin and necrotic/eschar    Pre Debridement Measurements:  Are located in the [place]  Documentation Flow Sheet    Wound/Ulcer #: 1    Post Debridement Measurements:  Wound/Ulcer Descriptions are Pre Debridement except measurements:    Wound 11/01/17 Other (Comment) Breast Right #1 (Active)   Wound Image   11/1/2017 10:42 AM   Wound Type Wound 11/1/2017 10:42 AM   Wound Other 11/1/2017 10:42 AM   Wound Cleansed Rinsed/Irrigated with saline 11/1/2017 10:42 AM   Wound Length (cm) 1.9 cm 11/1/2017 10:42 AM   Wound Width (cm) 1.5 cm 11/1/2017 10:42 AM   Wound Depth (cm)  0.6 11/1/2017 10:42 AM   Calculated Wound Size (cm^2) (l*w) 2.85 cm^2 11/1/2017 10:42 AM   Undermining Starts ___ O'Clock 12 11/1/2017 10:42 AM   Undermining Ends___ O'Clock 11 11/1/2017 10:42 AM   Undermining Maxium Distance (cm) 1 11/1/2017 10:42 AM   Molly-wound Assessment Painful;Dry 11/1/2017 10:42 AM   [place]%Wound Bed 10 11/1/2017 10:42 AM   Yellow%Wound Bed 90 11/1/2017 10:42 AM   Drainage Amount Moderate 11/1/2017 10:42 AM   Drainage Description Yellow 11/1/2017 10:42 AM   Op First Treatment Date 11/01/17 11/1/2017 10:42 AM   Number of days: 0       Percent of Wound/Ulcer Debrided: 30%    Total Surface Area Debrided:  0.85 sq cm     Diabetic/Pressure/Non Pressure Ulcers:  Ulcer: N/A      Bleeding:  None    Hemostasis Achieved:  not needed    Procedural Pain:  0  / 10     Post Procedural Pain:  0 / 10     Response to treatment:  Well tolerated by patient.        Plan:     Treatment Note please see attached Discharge Instructions    In my professional opinion this patient would benefit from HBO Therapy: No    Written patient dismissal instructions given to patient and signed by patient or POA. Thank you for choosing [facility] and allowing us to help heal your wounds. If you should have any questions after your visit, please call [phone]. Discharge Instructions       Visit Discharge/Physician Orders:    Home Care:  none    Wound Location: Right Breast    Dressing orders: 1. Cleanse wound(s) with normal saline. 2. Apply dry SILVERCEL OR CALCIUM ALGINATE WITH Ag or eqivalent to wound bed. (Pack lightly into wound)  3. Cover with 4x4's and tape or Mepilex Border  4. Change  Every other day or Monday, Wednesday, and Friday    Compression: none    Other Instructions: Culture in [facility] today    Keep all dressings clean & dry. Do not shower, take baths or get wound wet, unless otherwise instructed by your Wound Care doctor. Follow up visit   2 Weeks  November 15, 2017  At 9:30    For Diabetic patients keep blood sugars below 150 for optimal wound healing. If you experience any of the following, please call the Wound Care Service during business hours: [phone]   *Increase in pain   *Temperature over 101   *Increase in drainage from your wound or a foul odor   *Uncontrolled swelling *Need for compression bandage changes due to slippage, breakthrough drainage      If you need medical attention outside of business hours, please contact your Primary Care Doctor or go to the nearest emergency room. Keep next scheduled appointment. Please give 24 hour notice if unable to keep appointment. [phone]    Patient Signature:_______________________     Nurse Signature_________________    Date: ___________ Time:  ____________         PLEASE NOTE: IF YOU ARE UNABLE TO OBTAIN WOUND SUPPLIES, CONTINUE TO USE THE SUPPLIES YOU HAVE AVAILABLE UNTIL YOU ARE ABLE TO [address].  IT IS MOST IMPORTANT TO KEEP THE WOUND COVERED AT ALL TIMES  Electronically signed by Malika Luque MD on 11/1/2017 at 11:10 AM        Electronically signed by

## 2017-11-01 NOTE — PROGRESS NOTES
[address] Physician Billing Sheet. Fatou Jacobson  AGE: 28 y.o.    GENDER: female  : 1990  TODAY'S DATE:  2017    ICD-10 [facility] Problems    Diagnosis Date Noted    Wound of right breast [S21.001A] 2017       PHYSICIAN PROCEDURES    CPT CODE  13388      Electronically signed by Natalio Hernandez MD on 2017 at 11:25 AM

## 2017-11-03 LAB
ANAEROBIC CULTURE: NORMAL
GRAM STAIN RESULT: NORMAL
WOUND/ABSCESS: NORMAL

## 2017-11-15 ENCOUNTER — HOSPITAL ENCOUNTER (OUTPATIENT)
Dept: WOUND CARE | Age: 27
Discharge: HOME | End: 2017-11-15
Payer: COMMERCIAL

## 2017-11-15 VITALS
HEART RATE: 75 BPM | RESPIRATION RATE: 18 BRPM | DIASTOLIC BLOOD PRESSURE: 60 MMHG | SYSTOLIC BLOOD PRESSURE: 121 MMHG | TEMPERATURE: 96 F

## 2017-11-15 PROCEDURE — 99213 OFFICE O/P EST LOW 20 MIN: CPT

## 2017-11-15 ASSESSMENT — PAIN SCALES - GENERAL: PAINLEVEL_OUTOF10: 5

## 2017-11-15 ASSESSMENT — PAIN DESCRIPTION - ORIENTATION: ORIENTATION: RIGHT

## 2017-11-15 ASSESSMENT — PAIN DESCRIPTION - LOCATION: LOCATION: BREAST;CHEST

## 2017-11-15 ASSESSMENT — PAIN DESCRIPTION - PAIN TYPE: TYPE: ACUTE PAIN

## 2017-11-15 NOTE — PROGRESS NOTES
D&C-OPERATIVE LAPAROSCOPY    INSERTION / REMOVAL / REPLACEMENT VENOUS ACCESS CATHETER N/A 10/27/2016    PLACEMENT VENOUS ACCESS DEVICE , PAT DONE 10-23 performed by Ab Hess MD at [address] N/A 7/17/2017    MASTECTOMY Right 10/17/2016    INJECTION METHYLENE BLUE RE-EXCISION RIGHT BREAST CANCER SITE, RIGHT SENTINEL NODE BIOPSY performed by Ab Hess MD at [address] [facility], [address] [place] [address] Right 04/04/2017    OTHER SURGICAL HISTORY Right 08/15/2017    EXCISION OF CHEST WALL MASS (DR POLANCO @ [facility])       FAMILY HISTORY    Family History   Problem Relation Age of Onset    Heart Attack Father 50   [facility] Arthritis Mother     COPD Mother     Emphysema Mother     Cancer Mother      esophagus    Breast Cancer Sister     Cervical Cancer Other     Breast Cancer Paternal Aunt     Endometrial Cancer Paternal Aunt     Stroke Maternal Grandmother     No Known Problems Maternal Grandfather     No Known Problems Paternal Grandmother     No Known Problems Paternal Grandfather     No Known Problems Sister     No Known Problems Sister     No Known Problems Sister     No Known Problems Sister     No Known Problems Daughter        SOCIAL HISTORY    Social History   Substance Use Topics    Smoking status: Former Smoker     Packs/day: 1.00     Years: 4.00     Types: Cigarettes     Start date: 2006     Quit date: 9/1/2010    Smokeless tobacco: Never Used    Alcohol use Yes      Comment: occ       ALLERGIES    Allergies   Allergen Reactions    Cherry Hives    Lansoprazole     Zantac [Ranitidine Hcl] Other (See Comments)     seizure       MEDICATIONS    Current Outpatient Prescriptions on File Prior to Encounter   Medication Sig Dispense Refill    silver sulfADIAZINE (SILVADENE) 1 % cream Apply topically daily. 15 g 1    oxyCODONE-acetaminophen (PERCOCET)  MG per tablet Take 1 tablet by mouth every 6 hours as needed for Pain .  30 tablet 0    prochlorperazine (COMPAZINE) AM   Wound Cleansed Rinsed/Irrigated with saline 11/15/2017  9:52 AM   Wound Length (cm) 0.5 cm 11/15/2017  9:52 AM   Wound Width (cm) 1.4 cm 11/15/2017  9:52 AM   Wound Depth (cm)  0.5 11/15/2017  9:52 AM   Calculated Wound Size (cm^2) (l*w) 0.7 cm^2 11/15/2017  9:52 AM   Change in Wound Size % (l*w) 75.44 11/15/2017  9:52 AM   Undermining Starts ___ O'Clock 10 11/15/2017  9:52 AM   Undermining Ends___ O'Clock 8 11/15/2017  9:52 AM   Undermining Maxium Distance (cm) 0.8 11/15/2017  9:52 AM   Drainage Amount Scant 11/15/2017  9:52 AM   Drainage Description Serosanguinous 11/15/2017  9:52 AM   Odor None 11/15/2017  9:52 AM   Molly-wound Assessment Painful;Dry 11/15/2017  9:52 AM   [place]%Wound Bed 50 11/15/2017  9:52 AM   Yellow%Wound Bed 50 11/15/2017  9:52 AM   Op First Treatment Date 11/01/17 11/1/2017 10:42 AM   Number of days: 14             Plan:     Treatment Note please see attached Discharge Instructions    In my professional opinion this patient would benefit from HBO Therapy: {Responses; no    Written patient dismissal instructions given to patient and signed by patient or POA. Thank you for choosing [facility] and allowing us to help heal your wounds. If you should have any questions after your visit, please call [phone]. Discharge Instructions       Visit Discharge/Physician Orders:     Home Care:  none     Wound Location: Right Breast     Dressing orders: 1. Cleanse wound(s) with normal saline. 2. Apply dry SILVERCEL OR CALCIUM ALGINATE WITH Ag or eqivalent to wound bed. (Pack lightly into wound)  3. Cover with 4x4's and tape or Mepilex Border  4. Change  Every other day or Monday, Wednesday, and Friday     Compression: none     Other Instructions:     Keep all dressings clean & dry.  Do not shower, take baths or get wound wet, unless otherwise instructed by your Wound Care doctor.     Follow up visit   4 Weeks    December 13, 2017 at  1:00     For Diabetic patients keep blood

## 2017-11-17 DIAGNOSIS — Z01.419 ENCOUNTER FOR ANNUAL ROUTINE GYNECOLOGICAL EXAMINATION: ICD-10-CM

## 2018-01-03 ENCOUNTER — HOSPITAL ENCOUNTER (OUTPATIENT)
Dept: WOUND CARE | Age: 28
Discharge: HOME | End: 2018-01-03
Payer: COMMERCIAL

## 2018-01-03 VITALS — SYSTOLIC BLOOD PRESSURE: 164 MMHG | TEMPERATURE: 96.4 F | DIASTOLIC BLOOD PRESSURE: 74 MMHG | HEART RATE: 80 BPM

## 2018-01-03 PROCEDURE — 99212 OFFICE O/P EST SF 10 MIN: CPT

## 2018-01-03 ASSESSMENT — PAIN DESCRIPTION - LOCATION: LOCATION: BREAST

## 2018-01-03 ASSESSMENT — PAIN SCALES - GENERAL: PAINLEVEL_OUTOF10: 7

## 2018-01-03 ASSESSMENT — PAIN DESCRIPTION - PAIN TYPE: TYPE: CHRONIC PAIN

## 2018-01-03 NOTE — PROGRESS NOTES
[facility] [address]   Progress Note and Procedure Note      Marilynn Landry  MEDICAL RECORD NUMBER:  [id number]  AGE: 28 y.o. GENDER: female  : 1990  EPISODE DATE:  1/3/2018    Subjective:     Chief Complaint   Patient presents with    Wound Check     right breast wound recheck        HISTORY of PRESENT ILLNESS HPI     Marilynn Landry is a 28 y.o. female who presents today  for wound/ulcer evaluation.    History of Wound Context: non-healing surgical ulcer  Wound/Ulcer Pain Timing/Severity: none  Quality of pain: N/A  Severity:  0 / 10   Modifying Factors: None  Associated Signs/Symptoms: none  Good progress, almost healed, clean    Ulcer Identification:  Ulcer Type: non-healing surgical  Contributing Factors: immunosuppression    Wound: N/A        PAST MEDICAL HISTORY        Diagnosis Date    Cancer ([facility] [address] 2016    IDC Right breast BRCA1/2 neg, ERPR <1% Her 2+ Ki67 70%  multifocal G2 with multifocal G3 DCIS 4.3 cm  LN + medial margin    Chest wall pain, chronic 2017    Depression with anxiety 2017    Endometriosis 2017    Estrogen receptor negative tumor status 3/28/2017    Overview:  HER-2 potitive    IBS (irritable bowel syndrome)     Marijuana use 2017    PCOS (polycystic ovarian syndrome) 2017    Prolonged emergence from general anesthesia 2016    trouble staying awake post-op    Wound of right breast 2017       PAST SURGICAL HISTORY    Past Surgical History:   Procedure Laterality Date    ABDOMINAL EXPLORATION SURGERY  2017    repair of perforated cecum, incidental appedectomy    APPENDECTOMY  2017    incidental - done during ex-lap with repair of perforated cecum    BREAST SURGERY Right 2016    Lumpectomy and SLN    CHOLECYSTECTOMY N/A 2004    HYSTERECTOMY VAGINAL Bilateral 10/17/2016    LAVH  BILATERAL  SALPINGECTOMY performed by Sharon Mercer DO at [address] [place] [address] HYSTEROSCOPY  2016    DR Henrique Stewart [place] the following, please call the Wound Care Service during business hours: [phone]         *Increase in pain   *Temperature over 101   *Increase in drainage from your wound or a foul odor   *Uncontrolled swelling      *Need for compression bandage changes due to slippage, breakthrough drainage     If you need medical attention outside of business hours, please contact your Primary Care Doctor or go to the nearest emergency room. Keep next scheduled appointment. Yenny Gwyn give 24 hour notice if unable to keep appointment. [phone]     Patient Signature:_______________________      Nurse Signature_________________     Date: ___________ Time:  ____________          PLEASE NOTE: IF YOU ARE UNABLE TO OBTAIN WOUND SUPPLIES, CONTINUE TO USE THE SUPPLIES YOU HAVE AVAILABLE UNTIL YOU ARE ABLE TO REACH US. IT IS MOST IMPORTANT TO KEEP THE WOUND COVERED AT ALL TIMES.   Electronically signed by Elissa Herndon MD on 1/3/2018 at 12:10 PM        Electronically signed by Elissa Herndon MD on 1/3/2018 at 12:11 PM

## 2018-03-07 ENCOUNTER — HOSPITAL ENCOUNTER (OUTPATIENT)
Dept: WOUND CARE | Age: 28
Discharge: HOME | End: 2018-03-07
Payer: COMMERCIAL

## 2018-03-07 VITALS
DIASTOLIC BLOOD PRESSURE: 83 MMHG | HEART RATE: 78 BPM | SYSTOLIC BLOOD PRESSURE: 142 MMHG | TEMPERATURE: 96.7 F | RESPIRATION RATE: 18 BRPM

## 2018-03-07 PROCEDURE — 99212 OFFICE O/P EST SF 10 MIN: CPT

## 2018-03-07 ASSESSMENT — PAIN DESCRIPTION - LOCATION: LOCATION: BREAST

## 2018-03-07 ASSESSMENT — PAIN DESCRIPTION - ORIENTATION: ORIENTATION: RIGHT

## 2018-03-07 ASSESSMENT — PAIN DESCRIPTION - DESCRIPTORS: DESCRIPTORS: SORE

## 2018-03-07 ASSESSMENT — PAIN DESCRIPTION - PAIN TYPE: TYPE: CHRONIC PAIN

## 2018-03-07 ASSESSMENT — PAIN SCALES - GENERAL: PAINLEVEL_OUTOF10: 5

## 2018-03-07 NOTE — PROGRESS NOTES
3/7/2018  1:58 PM   Wound Length (cm) 0 cm 3/7/2018  1:58 PM   Wound Width (cm) 0 cm 3/7/2018  1:58 PM   Wound Depth (cm)  0 3/7/2018  1:58 PM   Calculated Wound Size (cm^2) (l*w) 0 cm^2 3/7/2018  1:58 PM   Change in Wound Size % (l*w) 100 3/7/2018  1:58 PM   Undermining Starts ___ O'Clock 3:00 1/3/2018 11:51 AM   Undermining Ends___ O'Clock 6:00 1/3/2018 11:51 AM   Undermining Maxium Distance (cm) .2 1/3/2018 11:51 AM   Drainage Amount None 3/7/2018  1:58 PM   Drainage Description Serosanguinous 11/15/2017  9:52 AM   Odor None 11/15/2017  9:52 AM   Molly-wound Assessment Painful;Dry 11/15/2017  9:52 AM   Non-staged Wound Description Full thickness 1/3/2018 11:51 AM   [place]%Wound Bed 100 3/7/2018  1:58 PM   Yellow%Wound Bed 50 11/15/2017  9:52 AM   Op First Treatment Date 11/01/17 11/1/2017 10:42 AM   Number of days: 126       Incision 07/17/17 Abdomen Mid (Active)   Number of days: 232       Incision 07/17/17 Abdomen (Active)   Number of days: 232       P    Plan:     Treatment Note please see attached Discharge Instructions    In my professional opinion this patient would benefit from HBO Therapy: No    Written patient dismissal instructions given to patient and signed by patient or POA. Thank you for choosing [facility] [place] and allowing us to help heal your wounds. If you should have any questions after your visit, please call [phone]. Discharge Instructions       Wound Clinic Physician Orders and Discharge [address] [facility]  [address] [facility], [address] [facility]  Telephone: [phone]    NAME:  Fernando Forbes OF BIRTH:  1990  MEDICAL RECORD NUMBER:  [id number]  DATE:  3/7/2018    Congratulations!! You have completed your treatment. 1. Return to your Primary Care Physician for all your health issues. 2. Resume your ordinary activities as tolerated. 3. Take your medications as prescribed by your primary care physician.    4. Check your skin daily for cracks, bruises, sores, or dryness. Use a moisturizer as needed. 5. Clean and dry your skin, using mild soap and warm water (not hot). 6. Avoid alcohol and caffeine and do not smoke. 7. Maintain a nutritious diet. 8. Avoid pressure on your wound site. Keep your legs elevated above the level of the heart whenever possible. 9. Continue to use wraps/stockings/compression as prescribed. 10. Replace compression stockings every four to six months as needed to ensure proper fit. 11. Wear well-fitting shoes and leg garments. THANK YOU FOR ALLOWING US TO SERVE YOU.  PLEASE CALL IF YOU DEVELOP ANOTHER WOUND. [phone]   Electronically signed by Soo Cox MD on 3/7/2018 at 2:39 PM            Electronically signed by Janak Alexander RN on 3/7/2018 at 2:37 PM           Electronically signed by Soo Cox MD on 3/7/2018 at 2:40 PM

## 2018-12-15 ENCOUNTER — HOSPITAL ENCOUNTER (EMERGENCY)
Age: 28
Discharge: HOME | End: 2018-12-15
Attending: EMERGENCY MEDICINE
Payer: COMMERCIAL

## 2018-12-15 VITALS
TEMPERATURE: 98.5 F | HEIGHT: 62 IN | HEART RATE: 86 BPM | WEIGHT: 210 LBS | BODY MASS INDEX: 38.64 KG/M2 | DIASTOLIC BLOOD PRESSURE: 71 MMHG | OXYGEN SATURATION: 97 % | SYSTOLIC BLOOD PRESSURE: 137 MMHG | RESPIRATION RATE: 16 BRPM

## 2018-12-15 DIAGNOSIS — K08.89 PAIN, DENTAL: Primary | ICD-10-CM

## 2018-12-15 PROCEDURE — 99282 EMERGENCY DEPT VISIT SF MDM: CPT

## 2018-12-15 PROCEDURE — 6370000000 HC RX 637 (ALT 250 FOR IP): Performed by: EMERGENCY MEDICINE

## 2018-12-15 RX ORDER — CLINDAMYCIN HYDROCHLORIDE 150 MG/1
300 CAPSULE ORAL ONCE
Status: COMPLETED | OUTPATIENT
Start: 2018-12-15 | End: 2018-12-15

## 2018-12-15 RX ORDER — OXYCODONE HCL 20 MG/1
TABLET, FILM COATED, EXTENDED RELEASE ORAL EVERY 12 HOURS
COMMUNITY
End: 2019-06-18

## 2018-12-15 RX ORDER — VENLAFAXINE HYDROCHLORIDE 37.5 MG/1
37.5 CAPSULE, EXTENDED RELEASE ORAL DAILY
COMMUNITY
End: 2019-12-02

## 2018-12-15 RX ORDER — CLINDAMYCIN HYDROCHLORIDE 300 MG/1
300 CAPSULE ORAL 3 TIMES DAILY
Qty: 30 CAPSULE | Refills: 0 | Status: SHIPPED | OUTPATIENT
Start: 2018-12-15 | End: 2018-12-25

## 2018-12-15 RX ADMIN — CLINDAMYCIN HYDROCHLORIDE 300 MG: 150 CAPSULE ORAL at 15:51

## 2018-12-15 ASSESSMENT — ENCOUNTER SYMPTOMS
EYE DISCHARGE: 0
FACIAL SWELLING: 0
VOICE CHANGE: 0
SHORTNESS OF BREATH: 0
TROUBLE SWALLOWING: 0
WHEEZING: 0
CHEST TIGHTNESS: 0
EYE REDNESS: 0
VOMITING: 0
STRIDOR: 0
SORE THROAT: 0
ABDOMINAL PAIN: 0
SINUS PRESSURE: 0
BLOOD IN STOOL: 0
COUGH: 0
CHOKING: 0
EYE PAIN: 0
CONSTIPATION: 0
DIARRHEA: 0
BACK PAIN: 0

## 2018-12-15 ASSESSMENT — PAIN SCALES - GENERAL
PAINLEVEL_OUTOF10: 9
PAINLEVEL_OUTOF10: 9

## 2018-12-15 ASSESSMENT — PAIN DESCRIPTION - LOCATION: LOCATION: MOUTH;TEETH

## 2018-12-15 ASSESSMENT — PAIN DESCRIPTION - ORIENTATION: ORIENTATION: LEFT;LOWER

## 2018-12-15 ASSESSMENT — PAIN DESCRIPTION - ONSET: ONSET: ON-GOING

## 2018-12-15 NOTE — ED PROVIDER NOTES
[facility] ED  eMERGENCY dEPARTMENT eNCOUnter      Pt Name: Mikayla Weinberg  MRN: [id number]  [place] 1990  Date of evaluation: 12/15/2018  Provider: Sunshine Au MD    CHIEF COMPLAINT       Chief Complaint   Patient presents with    Dental Pain     Pt c/o left lower dental pain, since last night, took oxycodone at 1pm otday       HISTORY OF PRESENT ILLNESS   (Location/Symptom, Timing/Onset,Context/Setting, Quality, Duration, Modifying Factors, Severity)  Note limiting factors. Mikayla Weinberg is a 33 y.o. female who presents to the emergency department Patient with a history of anxiety depression and irritable bowel syndrome and breast cancer come to this emergency because of the dental pain which started last night pain in the left lower jaw unable to see a dentist though no documented fever no drainage    HPI    NursingNotes were reviewed. REVIEW OF SYSTEMS    (2-9 systems for level 4, 10 or more for level 5)     Review of Systems   Constitutional: Negative. Negative for activity change and fever. HENT: Positive for dental problem. Negative for congestion, drooling, facial swelling, mouth sores, nosebleeds, sinus pressure, sore throat, trouble swallowing and voice change. Eyes: Negative for pain, discharge, redness and visual disturbance. Respiratory: Negative for cough, choking, chest tightness, shortness of breath, wheezing and stridor. Cardiovascular: Negative for chest pain, palpitations and leg swelling. Gastrointestinal: Negative for abdominal pain, blood in stool, constipation, diarrhea and vomiting. Endocrine: Negative for cold intolerance, polyphagia and polyuria. Genitourinary: Negative for dysuria, flank pain, frequency, genital sores and urgency. Musculoskeletal: Negative for back pain, joint swelling, neck pain and neck stiffness. Skin: Negative for pallor and rash.    Neurological: Negative for tremors, seizures, syncope, weakness, numbness and Arthritis Mother     COPD Mother     Emphysema Mother     Cancer Mother         esophagus    Breast Cancer Sister     Cervical Cancer Other     Breast Cancer Paternal Aunt     Endometrial Cancer Paternal Aunt     Stroke Maternal Grandmother     No Known Problems Maternal Grandfather     No Known Problems Paternal Grandmother     No Known Problems Paternal Grandfather     No Known Problems Sister     No Known Problems Sister     No Known Problems Sister     No Known Problems Sister     No Known Problems Daughter           SOCIAL HISTORY       Social History     Social History    Marital status:      Spouse name: Katlyn Calabrese Number of children: N/A    Years of education: N/A     Occupational History    nurses aide      Social History Main Topics    Smoking status: Former Smoker     Packs/day: 1.00     Years: 4.00     Types: Cigarettes     Start date: 2006     Quit date: 9/1/2010    Smokeless tobacco: Never Used    Alcohol use Yes      Comment: occ    Drug use: Yes     Types: Marijuana      Comment: daily marijuana use    Sexual activity: Yes     Partners: Male     Other Topics Concern    None     Social History Narrative    Lives with  and daughter        2 cats    1 dog    1 hamster    10 chickens        Likes to coupon       SCREENINGS      @FLOW([id number])@      PHYSICAL EXAM    (up to 7 for level 4, 8 or more for level 5)     ED Triage Vitals [12/15/18 1533]   BP Temp Temp Source Pulse Resp SpO2 Height Weight   137/71 98.5 °F (36.9 °C) Oral 86 16 97 % 5' 2\" (1.575 m) 210 lb (95.3 kg)       Physical Exam   Constitutional: She is oriented to person, place, and time. She appears well-developed. Active alert cooperative patient very anxious at this time ambulatory   HENT:   Head: Normocephalic and atraumatic.    Right Ear: External ear normal.   Left Ear: External ear normal.   Nose: Nose normal.   Mouth/Throat: Oropharynx is clear and moist.   Attention given to the oral cavity and

## 2018-12-15 NOTE — ED TRIAGE NOTES
Pt c/o left lower dental pain which began last night, has a nerve exposed. Pt taken pain meds on daily basis. Pt is with palliative care for her cancer pain. Last dose of pian meds 1pm today.

## 2019-06-17 PROBLEM — I85.01 ESOPHAGEAL VARICES WITH BLEEDING (HCC): Status: ACTIVE | Noted: 2019-05-19

## 2019-06-17 PROBLEM — Z79.891 OPIOID USE AGREEMENT EXISTS: Status: ACTIVE | Noted: 2019-04-12

## 2019-06-17 PROBLEM — G89.29 CHRONIC PAIN: Status: ACTIVE | Noted: 2019-05-20

## 2019-06-17 PROBLEM — Z51.11 ENCOUNTER FOR ANTINEOPLASTIC CHEMOTHERAPY: Status: ACTIVE | Noted: 2017-08-21

## 2019-06-17 PROBLEM — E66.9 OBESITY, CLASS II, BMI 35-39.9: Status: ACTIVE | Noted: 2019-06-09

## 2019-06-17 PROBLEM — G89.29 CHRONIC MIDLINE LOW BACK PAIN WITHOUT SCIATICA: Status: ACTIVE | Noted: 2019-04-02

## 2019-06-17 PROBLEM — R53.0 NEOPLASTIC MALIGNANT RELATED FATIGUE: Status: ACTIVE | Noted: 2018-08-17

## 2019-06-17 PROBLEM — K22.11 ULCER OF ESOPHAGUS WITH BLEEDING: Status: ACTIVE | Noted: 2019-05-21

## 2019-06-17 PROBLEM — F41.1 GAD (GENERALIZED ANXIETY DISORDER): Status: ACTIVE | Noted: 2018-08-17

## 2019-06-17 PROBLEM — K92.0 HEMATEMESIS WITH NAUSEA: Status: ACTIVE | Noted: 2019-06-08

## 2019-06-17 PROBLEM — G89.3 CANCER ASSOCIATED PAIN: Status: ACTIVE | Noted: 2018-08-17

## 2019-06-17 PROBLEM — E66.812 OBESITY, CLASS II, BMI 35-39.9: Status: ACTIVE | Noted: 2019-06-09

## 2019-06-17 PROBLEM — M54.50 CHRONIC MIDLINE LOW BACK PAIN WITHOUT SCIATICA: Status: ACTIVE | Noted: 2019-04-02

## 2019-06-17 PROBLEM — C50.919 HER2-POSITIVE CARCINOMA OF BREAST (HCC): Status: ACTIVE | Noted: 2018-05-02

## 2019-06-17 PROBLEM — F90.2 ATTENTION DEFICIT HYPERACTIVITY DISORDER (ADHD), COMBINED TYPE: Status: ACTIVE | Noted: 2017-08-14

## 2019-06-17 PROBLEM — K74.60 CIRRHOSIS OF LIVER (HCC): Status: ACTIVE | Noted: 2019-05-20

## 2019-06-17 PROBLEM — M25.552 LEFT HIP PAIN: Status: ACTIVE | Noted: 2018-02-20

## 2019-06-17 PROBLEM — D69.6 THROMBOCYTOPENIA (HCC): Status: ACTIVE | Noted: 2019-05-20

## 2019-06-17 PROBLEM — Z17.31 HER2-POSITIVE CARCINOMA OF BREAST (HCC): Status: ACTIVE | Noted: 2018-05-02

## 2019-06-18 RX ORDER — LIDOCAINE AND PRILOCAINE 25; 25 MG/G; MG/G
CREAM TOPICAL
COMMUNITY
Start: 2019-02-14 | End: 2019-12-02

## 2019-06-18 RX ORDER — DOCUSATE SODIUM 100 MG/1
100 CAPSULE, LIQUID FILLED ORAL
COMMUNITY
Start: 2018-09-07 | End: 2020-01-13

## 2019-06-18 RX ORDER — OXYCODONE HCL 40 MG/1
40 TABLET, FILM COATED, EXTENDED RELEASE ORAL
COMMUNITY
Start: 2019-06-07 | End: 2019-07-07

## 2019-06-18 RX ORDER — VENLAFAXINE HYDROCHLORIDE 75 MG/1
CAPSULE, EXTENDED RELEASE ORAL
Refills: 5 | COMMUNITY
Start: 2019-06-13 | End: 2021-07-23

## 2019-06-18 RX ORDER — PANTOPRAZOLE SODIUM 40 MG/1
TABLET, DELAYED RELEASE ORAL
Refills: 2 | COMMUNITY
Start: 2019-05-21 | End: 2019-12-02

## 2019-06-18 RX ORDER — SUCRALFATE 1 G/1
1 TABLET ORAL
COMMUNITY
Start: 2019-05-21 | End: 2019-06-20

## 2019-06-18 RX ORDER — METOCLOPRAMIDE 5 MG/1
5 TABLET ORAL
COMMUNITY
Start: 2019-06-07 | End: 2019-09-05

## 2019-06-18 RX ORDER — LIDOCAINE 50 MG/G
1 PATCH TOPICAL
COMMUNITY
Start: 2019-01-17 | End: 2022-08-01

## 2019-06-18 RX ORDER — OXYCODONE HYDROCHLORIDE 10 MG/1
10 TABLET ORAL
COMMUNITY
Start: 2019-06-07 | End: 2019-07-07

## 2019-06-18 RX ORDER — SENNA PLUS 8.6 MG/1
8.6 TABLET ORAL
COMMUNITY
Start: 2018-09-07 | End: 2019-12-02

## 2019-06-18 RX ORDER — NALOXONE HYDROCHLORIDE 4 MG/.1ML
1 SPRAY NASAL
COMMUNITY
Start: 2019-03-14

## 2019-06-18 RX ORDER — LACTULOSE 20 G/30ML
20 SOLUTION ORAL
COMMUNITY
Start: 2019-06-05 | End: 2019-12-02

## 2019-06-18 RX ORDER — PROPRANOLOL HYDROCHLORIDE 80 MG/1
80 CAPSULE, EXTENDED RELEASE ORAL
COMMUNITY
Start: 2019-06-11 | End: 2019-12-02

## 2019-06-18 RX ORDER — METHYLPHENIDATE HYDROCHLORIDE 5 MG/1
10 TABLET ORAL
COMMUNITY
Start: 2019-05-07 | End: 2020-01-13

## 2019-06-18 RX ORDER — PROPRANOLOL HCL 60 MG
CAPSULE, EXTENDED RELEASE 24HR ORAL
Refills: 2 | COMMUNITY
Start: 2019-05-14 | End: 2019-12-02

## 2019-07-17 PROBLEM — Z51.11 ENCOUNTER FOR ANTINEOPLASTIC CHEMOTHERAPY: Status: RESOLVED | Noted: 2017-08-21 | Resolved: 2019-07-17

## 2019-12-02 ENCOUNTER — OFFICE VISIT (OUTPATIENT)
Dept: SURGERY | Age: 29
End: 2019-12-02
Payer: COMMERCIAL

## 2019-12-02 VITALS
HEART RATE: 79 BPM | DIASTOLIC BLOOD PRESSURE: 67 MMHG | RESPIRATION RATE: 18 BRPM | HEIGHT: 62 IN | WEIGHT: 206.4 LBS | BODY MASS INDEX: 37.98 KG/M2 | SYSTOLIC BLOOD PRESSURE: 125 MMHG

## 2019-12-02 DIAGNOSIS — E66.9 OBESITY, CLASS II, BMI 35-39.9: ICD-10-CM

## 2019-12-02 DIAGNOSIS — Z17.1 MALIGNANT NEOPLASM OF OVERLAPPING SITES OF RIGHT BREAST IN FEMALE, ESTROGEN RECEPTOR NEGATIVE (HCC): Primary | ICD-10-CM

## 2019-12-02 DIAGNOSIS — N63.20 LEFT BREAST MASS: ICD-10-CM

## 2019-12-02 DIAGNOSIS — C50.811 MALIGNANT NEOPLASM OF OVERLAPPING SITES OF RIGHT BREAST IN FEMALE, ESTROGEN RECEPTOR NEGATIVE (HCC): Primary | ICD-10-CM

## 2019-12-02 PROCEDURE — 99214 OFFICE O/P EST MOD 30 MIN: CPT | Performed by: SURGERY

## 2019-12-02 RX ORDER — OXYCODONE HCL 20 MG/1
20 TABLET, FILM COATED, EXTENDED RELEASE ORAL 2 TIMES DAILY
COMMUNITY
Start: 2019-11-04 | End: 2019-12-04

## 2019-12-02 RX ORDER — OXYCODONE HYDROCHLORIDE 10 MG/1
1 TABLET ORAL
Refills: 0 | COMMUNITY
Start: 2019-11-05 | End: 2020-07-15

## 2019-12-02 RX ORDER — LIDOCAINE AND PRILOCAINE 25; 25 MG/G; MG/G
1 CREAM TOPICAL DAILY PRN
COMMUNITY
Start: 2019-08-14 | End: 2022-08-01

## 2019-12-02 ASSESSMENT — ENCOUNTER SYMPTOMS
COUGH: 0
DIARRHEA: 1
SORE THROAT: 0
VOMITING: 1
NAUSEA: 1
ABDOMINAL PAIN: 1
BACK PAIN: 1
SHORTNESS OF BREATH: 0
CONSTIPATION: 1
CHEST TIGHTNESS: 0
COLOR CHANGE: 0

## 2019-12-09 ENCOUNTER — HOSPITAL ENCOUNTER (OUTPATIENT)
Dept: ULTRASOUND IMAGING | Age: 29
Discharge: HOME | End: 2019-12-11
Payer: COMMERCIAL

## 2019-12-09 ENCOUNTER — HOSPITAL ENCOUNTER (OUTPATIENT)
Dept: WOMENS IMAGING | Age: 29
Discharge: HOME | End: 2019-12-11
Payer: COMMERCIAL

## 2019-12-09 DIAGNOSIS — N63.20 LEFT BREAST MASS: ICD-10-CM

## 2019-12-09 PROCEDURE — 76642 ULTRASOUND BREAST LIMITED: CPT

## 2019-12-09 PROCEDURE — G0279 TOMOSYNTHESIS, MAMMO: HCPCS

## 2019-12-12 ENCOUNTER — TELEPHONE (OUTPATIENT)
Dept: OBGYN CLINIC | Age: 29
End: 2019-12-12

## 2020-01-13 ENCOUNTER — OFFICE VISIT (OUTPATIENT)
Dept: FAMILY MEDICINE CLINIC | Age: 30
End: 2020-01-13
Payer: COMMERCIAL

## 2020-01-13 VITALS
HEIGHT: 63 IN | HEART RATE: 75 BPM | DIASTOLIC BLOOD PRESSURE: 78 MMHG | OXYGEN SATURATION: 97 % | WEIGHT: 207 LBS | SYSTOLIC BLOOD PRESSURE: 122 MMHG | RESPIRATION RATE: 14 BRPM | TEMPERATURE: 98.3 F | BODY MASS INDEX: 36.68 KG/M2

## 2020-01-13 DIAGNOSIS — Z01.818 PRE-OP EXAM: ICD-10-CM

## 2020-01-13 LAB
ALBUMIN SERPL-MCNC: 4.3 G/DL (ref 3.5–4.6)
ALP BLD-CCNC: 81 U/L (ref 40–130)
ALT SERPL-CCNC: 21 U/L (ref 0–33)
ANION GAP SERPL CALCULATED.3IONS-SCNC: 11 MEQ/L (ref 9–15)
APTT: 30.2 SEC (ref 24.4–36.8)
AST SERPL-CCNC: 23 U/L (ref 0–35)
BILIRUB SERPL-MCNC: <0.2 MG/DL (ref 0.2–0.7)
BUN BLDV-MCNC: 9 MG/DL (ref 6–20)
CALCIUM SERPL-MCNC: 9 MG/DL (ref 8.5–9.9)
CHLORIDE BLD-SCNC: 102 MEQ/L (ref 95–107)
CO2: 25 MEQ/L (ref 20–31)
CREAT SERPL-MCNC: 0.51 MG/DL (ref 0.5–0.9)
GFR AFRICAN AMERICAN: >60
GFR NON-AFRICAN AMERICAN: >60
GLOBULIN: 3.2 G/DL (ref 2.3–3.5)
GLUCOSE BLD-MCNC: 93 MG/DL (ref 70–99)
HCT VFR BLD CALC: 41.9 % (ref 37–47)
HEMOGLOBIN: 14.2 G/DL (ref 12–16)
INR BLD: 0.9
MCH RBC QN AUTO: 31 PG (ref 27–31.3)
MCHC RBC AUTO-ENTMCNC: 34 % (ref 33–37)
MCV RBC AUTO: 91.1 FL (ref 82–100)
PDW BLD-RTO: 13.3 % (ref 11.5–14.5)
PLATELET # BLD: 134 K/UL (ref 130–400)
POTASSIUM SERPL-SCNC: 4.6 MEQ/L (ref 3.4–4.9)
PROTHROMBIN TIME: 12.9 SEC (ref 12.3–14.9)
RBC # BLD: 4.6 M/UL (ref 4.2–5.4)
SODIUM BLD-SCNC: 138 MEQ/L (ref 135–144)
TOTAL PROTEIN: 7.5 G/DL (ref 6.3–8)
WBC # BLD: 6.1 K/UL (ref 4.8–10.8)

## 2020-01-13 PROCEDURE — 99213 OFFICE O/P EST LOW 20 MIN: CPT | Performed by: NURSE PRACTITIONER

## 2020-01-13 PROCEDURE — 93000 ELECTROCARDIOGRAM COMPLETE: CPT | Performed by: NURSE PRACTITIONER

## 2020-01-13 RX ORDER — LORAZEPAM 1 MG/1
.5-1 TABLET ORAL EVERY 8 HOURS PRN
COMMUNITY
Start: 2020-01-03 | End: 2020-02-09

## 2020-01-13 RX ORDER — METHYLPHENIDATE HYDROCHLORIDE 5 MG/1
10 TABLET ORAL 2 TIMES DAILY
COMMUNITY
Start: 2019-11-27 | End: 2020-07-15

## 2020-01-13 NOTE — PROGRESS NOTES
Preoperative Consultation      Cyndi Jacobson  YOB: 1990    Date of Service:  1/13/2020    Vitals:    01/13/20 1006   BP: 122/78   Site: Left Upper Arm   Position: Sitting   Cuff Size: Large Adult   Pulse: 75   Resp: 14   Temp: 98.3 °F (36.8 °C)   TempSrc: Oral   SpO2: 97%   Weight: 207 lb (93.9 kg)   Height: 5' 3\" (1.6 m)      Wt Readings from Last 2 Encounters:   01/13/20 207 lb (93.9 kg)   12/02/19 206 lb 6.4 oz (93.6 kg)     BP Readings from Last 3 Encounters:   01/13/20 122/78   12/02/19 125/67   12/15/18 137/71        Chief Complaint   Patient presents with   [facility] Pre-op Exam     Patient is here for PAT for surgery on 1/16/2020 for bilateral breast mass excisional     Allergies   Allergen Reactions    Cherry Hives    Lansoprazole     Zantac [Ranitidine Hcl] Other (See Comments)     seizure    Octreotide Nausea And Vomiting     Extreme Vomiting     Outpatient Medications Marked as Taking for the 1/13/20 encounter (Office Visit) with ANN Jean - CNP   Medication Sig Dispense Refill    OXYCONTIN 20 MG extended release tablet Take 1 tablet by mouth every twelve hours as directed      LORazepam (ATIVAN) 1 MG tablet Take 0.5-1 mg by mouth every 8 hours as needed.  methylphenidate (RITALIN) 5 MG tablet Take 10 mg by mouth 2 times daily.  oxyCODONE HCl (OXY-IR) 10 MG immediate release tablet Take 1 tablet by mouth every 3 hours as needed. 0    lidocaine-prilocaine (EMLA) 2.5-2.5 % cream Apply 1 Dose topically daily as needed      lidocaine (LIDODERM) 5 % Place 1 patch onto the skin      naloxone (NARCAN) 4 MG/0.1ML LIQD nasal spray 1 spray by Nasal route      venlafaxine (EFFEXOR XR) 75 MG extended release capsule Take 1 capsule by mouth once daily.   5    Compression Sleeve MISC USE AS DIRECTED  0    ondansetron (ZOFRAN) 4 MG tablet Take 4 mg by mouth      prochlorperazine (COMPAZINE) 10 MG tablet Take 1 tablet by mouth every 8 hours as needed (NAUSEA) 15 tablet 1       This patient presents to the office today for a preoperative consultation at the request of surgeon, Dr. Milton Dorado, who plans on performing  Left Mastectomy on January 16 at [facility]. The current problem began 4 year ago, and symptoms have been unchanged with time. Conservative therapy: Yes: chemotherapy infusions, which has been somewhat effective. The left breast still has some questionable skin rashes that are not going away. Has already had a right mastectomy. There is a hard nodule on right chest close to the scar line.      Planned anesthesia: General   Known anesthesia problems: None   Bleeding risk: No recent or remote history of abnormal bleeding  Personal or FH of DVT/PE: No    Patient objection to receiving blood products: No     Patient Active Problem List   Diagnosis    Menometrorrhagia    Malignant neoplasm of central portion of right female breast ([address]    Pelvic pain in female    Poor venous access    Anxiety    IBS (irritable bowel syndrome)    PCOS (polycystic ovarian syndrome)    Estrogen receptor negative tumor status    Marijuana use    Depression with anxiety    Chest wall pain, chronic    Perforation of intestine (HCC)    Wound of right breast    Attention deficit hyperactivity disorder (ADHD), combined type    Cancer associated pain    Chronic midline low back pain without sciatica    Chronic pain    Cirrhosis of liver (HCC)    Esophageal varices with bleeding (HCC)    JOANN (generalized anxiety disorder)    Hematemesis with nausea    HER2-positive carcinoma of breast ([address]    Left hip pain    Malignant neoplasm of overlapping sites of right female breast ([address]    Obesity, Class II, BMI 35-39.9    Neoplastic malignant related fatigue    Opioid use agreement exists    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)    Thrombocytopenia (HCC)    Ulcer of esophagus with bleeding    Left breast mass       Past Medical History:   Diagnosis Date    Cancer ([facility] 09/2016    IDC Right breast BRCA1/2 neg, ERPR <1% Her 2+ Ki67 70%  multifocal G2 with multifocal G3 DCIS 4.3 cm 8/8 LN + medial margin    Chest wall pain, chronic 7/13/2017    Chronic kidney disease     Cirrhosis of liver ([facility] 5/20/2019    Depression with anxiety 7/13/2017    Depression with anxiety     Endometriosis 7/13/2017    Estrogen receptor negative tumor status 3/28/2017    Overview:  HER-2 potitive    IBS (irritable bowel syndrome)     Marijuana use 7/13/2017    Neuropathy     Osteoarthritis     PCOS (polycystic ovarian syndrome) 7/13/2017    Prolonged emergence from general anesthesia 08/2016    trouble staying awake post-op    Restless legs syndrome     Wound of right breast 11/1/2017     Past Surgical History:   Procedure Laterality Date    ABDOMINAL EXPLORATION SURGERY  07/18/2017    repair of perforated cecum, incidental appedectomy    APPENDECTOMY  07/18/2017    incidental - done during ex-lap with repair of perforated cecum    BREAST SURGERY Right 09/22/2016    Lumpectomy and SLN    CHOLECYSTECTOMY N/A 2004    HYSTERECTOMY VAGINAL Bilateral 10/17/2016    LAVH  BILATERAL  SALPINGECTOMY performed by Demario Thomas DO at [address] [place] [address] HYSTEROSCOPY  05/16/2016    DR Geovanna Ramos    HYSTEROSCOPY  08/22/2016    FRACTIONAL D&C-OPERATIVE LAPAROSCOPY    INSERTION / REMOVAL / REPLACEMENT VENOUS ACCESS CATHETER N/A 10/27/2016    PLACEMENT VENOUS ACCESS DEVICE , PAT DONE 10-23 performed by Coleen Meckel, MD at [address] 7/17/2017    MASTECTOMY Right 10/17/2016    INJECTION METHYLENE BLUE RE-EXCISION RIGHT BREAST CANCER SITE, RIGHT SENTINEL NODE BIOPSY performed by Coleen Meckel, MD at [address] [facility], [address] [place] [address] Right 04/04/2017    OTHER SURGICAL HISTORY Right 08/15/2017    EXCISION OF CHEST WALL MASS (DR POLANCO @ [facility])     Family History   Problem Relation Age of Onset    Heart Attack Father 50    Arthritis Mother     COPD Mother     Emphysema Mother     Cancer Mother         esophagus    Breast Cancer Sister     Cervical Cancer Other     Breast Cancer Paternal Aunt     Endometrial Cancer Paternal Aunt     Stroke Maternal Grandmother     No Known Problems Maternal Grandfather     No Known Problems Paternal Grandmother     No Known Problems Paternal Grandfather     No Known Problems Sister     No Known Problems Sister     No Known Problems Sister     No Known Problems Sister     No Known Problems Daughter      Social History     Socioeconomic History    Marital status:      Spouse name: xenai    Number of children: Not on file    Years of education: Not on file    Highest education level: Not on file   Occupational History    Occupation: nurses aide   Social Needs    Financial resource strain: Not on file    Food insecurity:     Worry: Not on file     Inability: Not on file    Transportation needs:     Medical: Not on file     Non-medical: Not on file   Tobacco Use    Smoking status: Former Smoker     Packs/day: 1.00     Years: 4.00     Pack years: 4.00     Types: Cigarettes     Start date:      Last attempt to quit: 2010     Years since quittin.3    Smokeless tobacco: Never Used   Substance and Sexual Activity    Alcohol use: Yes     Comment: occassional    Drug use: Yes     Types: Marijuana     Comment: daily marijuana use    Sexual activity: Yes     Partners: Male   Lifestyle    Physical activity:     Days per week: Not on file     Minutes per session: Not on file    Stress: Not on file   Relationships    Social connections:     Talks on phone: Not on file     Gets together: Not on file     Attends Jehovah's witness service: Not on file     Active member of club or organization: Not on file     Attends meetings of clubs or organizations: Not on file     Relationship status: Not on file    Intimate partner violence:     Fear of current or ex partner: Not on file     Emotionally abused: Not on file     Physically abused: Not on file     Forced sexual activity: Not on file   Other Topics Concern    Not on file   Social History Narrative    Lives with  and daughter        2 cats    1 dog    1 hamster    10 chickens        Likes to coupon       Review of Systems  A comprehensive review of systems was negative except for what was noted in the HPI. Physical Exam   Constitutional: She is oriented to person, place, and time. She appears well-developed and well-nourished. No distress. HENT:   Head: Normocephalic and atraumatic. Mouth/Throat: Uvula is midline, oropharynx is clear and moist and mucous membranes are normal.   Eyes: Conjunctivae and EOM are normal. Pupils are equal, round, and reactive to light. Neck: Trachea normal and normal range of motion. Neck supple. No JVD present. Carotid bruit is not present. No mass and no thyromegaly present. Cardiovascular: Normal rate, regular rhythm, normal heart sounds and intact distal pulses. Exam reveals no gallop and no friction rub. No murmur heard. Pulmonary/Chest: Effort normal and breath sounds normal. No respiratory distress. She has no wheezes. She has no rales. Abdominal: Soft. Normal aorta and bowel sounds are normal. She exhibits no distension and no mass. There is no hepatosplenomegaly. Abd is tender to palpation. Pt states it is always like this due to her chronic liver and spleen problems. Musculoskeletal: She exhibits no edema and no tenderness. Neurological: She is alert and oriented to person, place, and time. She has normal strength. No cranial nerve deficit or sensory deficit. Coordination and gait normal.   Skin: Skin is warm and dry. No rash noted. No erythema. Psychiatric: She has a normal mood and affect. Her behavior is normal.     EKG Interpretation:  unchanged from previous tracings. Could not get the EKG analyzed here please review it. Appears NSR.     Lab

## 2020-01-16 ENCOUNTER — ANESTHESIA (OUTPATIENT)
Dept: OPERATING ROOM | Age: 30
End: 2020-01-16
Payer: COMMERCIAL

## 2020-01-16 ENCOUNTER — HOSPITAL ENCOUNTER (OUTPATIENT)
Age: 30
Setting detail: OUTPATIENT SURGERY
Discharge: HOME | End: 2020-01-16
Attending: SURGERY | Admitting: SURGERY
Payer: COMMERCIAL

## 2020-01-16 ENCOUNTER — ANESTHESIA EVENT (OUTPATIENT)
Dept: OPERATING ROOM | Age: 30
End: 2020-01-16
Payer: COMMERCIAL

## 2020-01-16 VITALS
WEIGHT: 207 LBS | HEIGHT: 63 IN | SYSTOLIC BLOOD PRESSURE: 112 MMHG | DIASTOLIC BLOOD PRESSURE: 57 MMHG | OXYGEN SATURATION: 93 % | HEART RATE: 64 BPM | TEMPERATURE: 97.9 F | BODY MASS INDEX: 36.68 KG/M2 | RESPIRATION RATE: 15 BRPM

## 2020-01-16 VITALS — TEMPERATURE: 95.9 F | OXYGEN SATURATION: 100 % | SYSTOLIC BLOOD PRESSURE: 155 MMHG | DIASTOLIC BLOOD PRESSURE: 106 MMHG

## 2020-01-16 PROBLEM — R22.2 MASS OF RIGHT CHEST WALL: Status: ACTIVE | Noted: 2020-01-16

## 2020-01-16 PROCEDURE — 7100000000 HC PACU RECOVERY - FIRST 15 MIN: Performed by: SURGERY

## 2020-01-16 PROCEDURE — 88342 IMHCHEM/IMCYTCHM 1ST ANTB: CPT

## 2020-01-16 PROCEDURE — 2580000003 HC RX 258

## 2020-01-16 PROCEDURE — 6360000002 HC RX W HCPCS: Performed by: NURSE ANESTHETIST, CERTIFIED REGISTERED

## 2020-01-16 PROCEDURE — 2709999900 HC NON-CHARGEABLE SUPPLY: Performed by: SURGERY

## 2020-01-16 PROCEDURE — 88305 TISSUE EXAM BY PATHOLOGIST: CPT

## 2020-01-16 PROCEDURE — 2500000003 HC RX 250 WO HCPCS: Performed by: ANESTHESIOLOGY

## 2020-01-16 PROCEDURE — 6370000000 HC RX 637 (ALT 250 FOR IP): Performed by: SURGERY

## 2020-01-16 PROCEDURE — 88341 IMHCHEM/IMCYTCHM EA ADD ANTB: CPT

## 2020-01-16 PROCEDURE — 2580000003 HC RX 258: Performed by: ANESTHESIOLOGY

## 2020-01-16 PROCEDURE — 3700000001 HC ADD 15 MINUTES (ANESTHESIA): Performed by: SURGERY

## 2020-01-16 PROCEDURE — 3700000000 HC ANESTHESIA ATTENDED CARE: Performed by: SURGERY

## 2020-01-16 PROCEDURE — 2500000003 HC RX 250 WO HCPCS: Performed by: NURSE ANESTHETIST, CERTIFIED REGISTERED

## 2020-01-16 PROCEDURE — 7100000011 HC PHASE II RECOVERY - ADDTL 15 MIN: Performed by: SURGERY

## 2020-01-16 PROCEDURE — 3600000014 HC SURGERY LEVEL 4 ADDTL 15MIN: Performed by: SURGERY

## 2020-01-16 PROCEDURE — 6360000002 HC RX W HCPCS

## 2020-01-16 PROCEDURE — 7100000001 HC PACU RECOVERY - ADDTL 15 MIN: Performed by: SURGERY

## 2020-01-16 PROCEDURE — 6360000002 HC RX W HCPCS: Performed by: SURGERY

## 2020-01-16 PROCEDURE — 3600000004 HC SURGERY LEVEL 4 BASE: Performed by: SURGERY

## 2020-01-16 PROCEDURE — 2580000003 HC RX 258: Performed by: SURGERY

## 2020-01-16 PROCEDURE — 7100000010 HC PHASE II RECOVERY - FIRST 15 MIN: Performed by: SURGERY

## 2020-01-16 PROCEDURE — 19120 REMOVAL OF BREAST LESION: CPT | Performed by: SURGERY

## 2020-01-16 PROCEDURE — 6360000002 HC RX W HCPCS: Performed by: ANESTHESIOLOGY

## 2020-01-16 PROCEDURE — 2500000003 HC RX 250 WO HCPCS: Performed by: SURGERY

## 2020-01-16 RX ORDER — ONDANSETRON 2 MG/ML
INJECTION INTRAMUSCULAR; INTRAVENOUS PRN
Status: DISCONTINUED | OUTPATIENT
Start: 2020-01-16 | End: 2020-01-16 | Stop reason: SDUPTHER

## 2020-01-16 RX ORDER — FENTANYL CITRATE 50 UG/ML
25 INJECTION, SOLUTION INTRAMUSCULAR; INTRAVENOUS EVERY 10 MIN PRN
Status: DISCONTINUED | OUTPATIENT
Start: 2020-01-16 | End: 2020-01-16 | Stop reason: HOSPADM

## 2020-01-16 RX ORDER — ONDANSETRON 4 MG/1
4 TABLET, FILM COATED ORAL EVERY 8 HOURS PRN
COMMUNITY
End: 2021-07-23

## 2020-01-16 RX ORDER — MAGNESIUM HYDROXIDE 1200 MG/15ML
LIQUID ORAL CONTINUOUS PRN
Status: COMPLETED | OUTPATIENT
Start: 2020-01-16 | End: 2020-01-16

## 2020-01-16 RX ORDER — DEXAMETHASONE SODIUM PHOSPHATE 4 MG/ML
INJECTION, SOLUTION INTRA-ARTICULAR; INTRALESIONAL; INTRAMUSCULAR; INTRAVENOUS; SOFT TISSUE PRN
Status: DISCONTINUED | OUTPATIENT
Start: 2020-01-16 | End: 2020-01-16 | Stop reason: SDUPTHER

## 2020-01-16 RX ORDER — CEFAZOLIN SODIUM 2 G/50ML
2 SOLUTION INTRAVENOUS
Status: COMPLETED | OUTPATIENT
Start: 2020-01-16 | End: 2020-01-16

## 2020-01-16 RX ORDER — HYDROCODONE BITARTRATE AND ACETAMINOPHEN 5; 325 MG/1; MG/1
2 TABLET ORAL PRN
Status: DISCONTINUED | OUTPATIENT
Start: 2020-01-16 | End: 2020-01-16 | Stop reason: HOSPADM

## 2020-01-16 RX ORDER — SODIUM CHLORIDE, SODIUM LACTATE, POTASSIUM CHLORIDE, CALCIUM CHLORIDE 600; 310; 30; 20 MG/100ML; MG/100ML; MG/100ML; MG/100ML
INJECTION, SOLUTION INTRAVENOUS
Status: COMPLETED
Start: 2020-01-16 | End: 2020-01-16

## 2020-01-16 RX ORDER — PROPOFOL 10 MG/ML
INJECTION, EMULSION INTRAVENOUS PRN
Status: DISCONTINUED | OUTPATIENT
Start: 2020-01-16 | End: 2020-01-16 | Stop reason: SDUPTHER

## 2020-01-16 RX ORDER — LIDOCAINE HYDROCHLORIDE 10 MG/ML
2 INJECTION, SOLUTION EPIDURAL; INFILTRATION; INTRACAUDAL; PERINEURAL ONCE
Status: COMPLETED | OUTPATIENT
Start: 2020-01-16 | End: 2020-01-16

## 2020-01-16 RX ORDER — MIDAZOLAM HYDROCHLORIDE 1 MG/ML
INJECTION INTRAMUSCULAR; INTRAVENOUS PRN
Status: DISCONTINUED | OUTPATIENT
Start: 2020-01-16 | End: 2020-01-16 | Stop reason: SDUPTHER

## 2020-01-16 RX ORDER — FENTANYL CITRATE 50 UG/ML
INJECTION, SOLUTION INTRAMUSCULAR; INTRAVENOUS PRN
Status: DISCONTINUED | OUTPATIENT
Start: 2020-01-16 | End: 2020-01-16 | Stop reason: SDUPTHER

## 2020-01-16 RX ORDER — SUCCINYLCHOLINE/SOD CL,ISO/PF 100 MG/5ML
SYRINGE (ML) INTRAVENOUS PRN
Status: DISCONTINUED | OUTPATIENT
Start: 2020-01-16 | End: 2020-01-16 | Stop reason: SDUPTHER

## 2020-01-16 RX ORDER — ROCURONIUM BROMIDE 10 MG/ML
INJECTION, SOLUTION INTRAVENOUS PRN
Status: DISCONTINUED | OUTPATIENT
Start: 2020-01-16 | End: 2020-01-16 | Stop reason: SDUPTHER

## 2020-01-16 RX ORDER — HYDROCODONE BITARTRATE AND ACETAMINOPHEN 5; 325 MG/1; MG/1
1 TABLET ORAL PRN
Status: DISCONTINUED | OUTPATIENT
Start: 2020-01-16 | End: 2020-01-16 | Stop reason: HOSPADM

## 2020-01-16 RX ORDER — MEPERIDINE HYDROCHLORIDE 25 MG/ML
12.5 INJECTION INTRAMUSCULAR; INTRAVENOUS; SUBCUTANEOUS EVERY 5 MIN PRN
Status: DISCONTINUED | OUTPATIENT
Start: 2020-01-16 | End: 2020-01-16 | Stop reason: HOSPADM

## 2020-01-16 RX ORDER — DIPHENHYDRAMINE HYDROCHLORIDE 50 MG/ML
12.5 INJECTION INTRAMUSCULAR; INTRAVENOUS
Status: DISCONTINUED | OUTPATIENT
Start: 2020-01-16 | End: 2020-01-16 | Stop reason: HOSPADM

## 2020-01-16 RX ORDER — SODIUM CHLORIDE, SODIUM LACTATE, POTASSIUM CHLORIDE, CALCIUM CHLORIDE 600; 310; 30; 20 MG/100ML; MG/100ML; MG/100ML; MG/100ML
INJECTION, SOLUTION INTRAVENOUS CONTINUOUS
Status: DISCONTINUED | OUTPATIENT
Start: 2020-01-16 | End: 2020-01-16 | Stop reason: HOSPADM

## 2020-01-16 RX ORDER — ONDANSETRON 2 MG/ML
4 INJECTION INTRAMUSCULAR; INTRAVENOUS
Status: DISCONTINUED | OUTPATIENT
Start: 2020-01-16 | End: 2020-01-16 | Stop reason: HOSPADM

## 2020-01-16 RX ORDER — METOCLOPRAMIDE HYDROCHLORIDE 5 MG/ML
10 INJECTION INTRAMUSCULAR; INTRAVENOUS
Status: COMPLETED | OUTPATIENT
Start: 2020-01-16 | End: 2020-01-16

## 2020-01-16 RX ORDER — BUPIVACAINE HYDROCHLORIDE 2.5 MG/ML
INJECTION, SOLUTION EPIDURAL; INFILTRATION; INTRACAUDAL PRN
Status: DISCONTINUED | OUTPATIENT
Start: 2020-01-16 | End: 2020-01-16 | Stop reason: ALTCHOICE

## 2020-01-16 RX ORDER — LIDOCAINE HYDROCHLORIDE 20 MG/ML
INJECTION, SOLUTION INTRAVENOUS PRN
Status: DISCONTINUED | OUTPATIENT
Start: 2020-01-16 | End: 2020-01-16 | Stop reason: SDUPTHER

## 2020-01-16 RX ORDER — ONDANSETRON 2 MG/ML
INJECTION INTRAMUSCULAR; INTRAVENOUS
Status: COMPLETED
Start: 2020-01-16 | End: 2020-01-16

## 2020-01-16 RX ORDER — LIDOCAINE HYDROCHLORIDE 10 MG/ML
INJECTION, SOLUTION EPIDURAL; INFILTRATION; INTRACAUDAL; PERINEURAL PRN
Status: DISCONTINUED | OUTPATIENT
Start: 2020-01-16 | End: 2020-01-16 | Stop reason: ALTCHOICE

## 2020-01-16 RX ADMIN — METOCLOPRAMIDE 10 MG: 5 INJECTION, SOLUTION INTRAMUSCULAR; INTRAVENOUS at 09:45

## 2020-01-16 RX ADMIN — ROCURONIUM BROMIDE 25 MG: 10 INJECTION INTRAVENOUS at 08:03

## 2020-01-16 RX ADMIN — SUGAMMADEX 200 MG: 100 INJECTION, SOLUTION INTRAVENOUS at 08:31

## 2020-01-16 RX ADMIN — CEFAZOLIN SODIUM 2 G: 2 SOLUTION INTRAVENOUS at 07:53

## 2020-01-16 RX ADMIN — FENTANYL CITRATE 50 MCG: 50 INJECTION, SOLUTION INTRAMUSCULAR; INTRAVENOUS at 08:32

## 2020-01-16 RX ADMIN — LIDOCAINE HYDROCHLORIDE 0.1 ML: 10 INJECTION, SOLUTION EPIDURAL; INFILTRATION; INTRACAUDAL; PERINEURAL at 06:16

## 2020-01-16 RX ADMIN — Medication 100 MG: at 07:58

## 2020-01-16 RX ADMIN — FENTANYL CITRATE 50 MCG: 50 INJECTION, SOLUTION INTRAMUSCULAR; INTRAVENOUS at 08:00

## 2020-01-16 RX ADMIN — SODIUM CHLORIDE, POTASSIUM CHLORIDE, SODIUM LACTATE AND CALCIUM CHLORIDE: 600; 310; 30; 20 INJECTION, SOLUTION INTRAVENOUS at 08:29

## 2020-01-16 RX ADMIN — ONDANSETRON 4 MG: 2 INJECTION INTRAMUSCULAR; INTRAVENOUS at 08:13

## 2020-01-16 RX ADMIN — ONDANSETRON 4 MG: 2 INJECTION INTRAMUSCULAR; INTRAVENOUS at 07:20

## 2020-01-16 RX ADMIN — PROPOFOL 250 MG: 10 INJECTION, EMULSION INTRAVENOUS at 07:58

## 2020-01-16 RX ADMIN — SODIUM CHLORIDE, POTASSIUM CHLORIDE, SODIUM LACTATE AND CALCIUM CHLORIDE 1000 ML: 600; 310; 30; 20 INJECTION, SOLUTION INTRAVENOUS at 06:19

## 2020-01-16 RX ADMIN — DEXAMETHASONE SODIUM PHOSPHATE 4 MG: 4 INJECTION, SOLUTION INTRA-ARTICULAR; INTRALESIONAL; INTRAMUSCULAR; INTRAVENOUS; SOFT TISSUE at 07:58

## 2020-01-16 RX ADMIN — MIDAZOLAM HYDROCHLORIDE 2 MG: 2 INJECTION, SOLUTION INTRAMUSCULAR; INTRAVENOUS at 07:51

## 2020-01-16 RX ADMIN — FENTANYL CITRATE 25 MCG: 50 INJECTION, SOLUTION INTRAMUSCULAR; INTRAVENOUS at 09:05

## 2020-01-16 RX ADMIN — ROCURONIUM BROMIDE 5 MG: 10 INJECTION INTRAVENOUS at 07:58

## 2020-01-16 RX ADMIN — LIDOCAINE HYDROCHLORIDE 50 MG: 20 INJECTION, SOLUTION INTRAVENOUS at 07:58

## 2020-01-16 ASSESSMENT — PULMONARY FUNCTION TESTS
PIF_VALUE: 16
PIF_VALUE: 0
PIF_VALUE: 14
PIF_VALUE: 0
PIF_VALUE: 14
PIF_VALUE: 1
PIF_VALUE: 17
PIF_VALUE: 14
PIF_VALUE: 8
PIF_VALUE: 13
PIF_VALUE: 14
PIF_VALUE: 14
PIF_VALUE: 15
PIF_VALUE: 14
PIF_VALUE: 14
PIF_VALUE: 17
PIF_VALUE: 14
PIF_VALUE: 15
PIF_VALUE: 14
PIF_VALUE: 1
PIF_VALUE: 14
PIF_VALUE: 15
PIF_VALUE: 14
PIF_VALUE: 15
PIF_VALUE: 14
PIF_VALUE: 14
PIF_VALUE: 15
PIF_VALUE: 4
PIF_VALUE: 14
PIF_VALUE: 15
PIF_VALUE: 14
PIF_VALUE: 13
PIF_VALUE: 14
PIF_VALUE: 15
PIF_VALUE: 14
PIF_VALUE: 14
PIF_VALUE: 15
PIF_VALUE: 1
PIF_VALUE: 6
PIF_VALUE: 14
PIF_VALUE: 7
PIF_VALUE: 15
PIF_VALUE: 15
PIF_VALUE: 14
PIF_VALUE: 14

## 2020-01-16 ASSESSMENT — PAIN SCALES - GENERAL
PAINLEVEL_OUTOF10: 7
PAINLEVEL_OUTOF10: 7
PAINLEVEL_OUTOF10: 4

## 2020-01-16 ASSESSMENT — PAIN DESCRIPTION - LOCATION: LOCATION: BREAST

## 2020-01-16 ASSESSMENT — PAIN DESCRIPTION - ORIENTATION: ORIENTATION: RIGHT

## 2020-01-16 ASSESSMENT — PAIN - FUNCTIONAL ASSESSMENT: PAIN_FUNCTIONAL_ASSESSMENT: 0-10

## 2020-01-16 ASSESSMENT — PAIN DESCRIPTION - PAIN TYPE: TYPE: SURGICAL PAIN

## 2020-01-16 ASSESSMENT — PAIN DESCRIPTION - DESCRIPTORS: DESCRIPTORS: CRAMPING;ACHING

## 2020-01-16 NOTE — PROGRESS NOTES
Called into or 12, re: pt states unable to take tylenol with the chemo, and pt has norco ordered orally for home, per dr Andria Parnell, pt had local and can take her oxycodone she has at home when she gets home

## 2020-01-16 NOTE — OP NOTE
OPERATIVE REPORT    LOG ID: [id number]  Surgery Date: 1/16/2020   Incision/Procedure Start Time: 8:13  Incision Close/Procedure End Time: 8:28    Procedure Performed: Procedure(s):  BILATERAL BREAST MASS EXCISIONAL BIOPSY       Surgeon(s)/Proceduralist(s) and Assistant(s):  Surgeon(s) and Role:     * Luigi Givens MD - Primary  First Assistant: Nahid Perez  Scrub Person First: Maryse Alvarez       Anesthesia: Monitor Anesthesia Care   Anesthesiologist: Deanna Alvarez MD  CRNA: ANN Mark - MICHAEL     Pre-Operative Diagnosis: BILATERAL BREAST MASS  Post-Operative Diagnosis: SAME    ESTIMATED BLOOD LOSS: Minimal, 1cc     COMPLICATIONS: No complications. FINDINGS: bilateral breast masses    SPECIMEN: bilateral breast masses    DRAINS: No drains were placed. PREOPERATIVE ANTIBIOTICS: ANCEF 2 GRAMS     INDICATIONS:  Louie Marshall is a 26 y.o.   female  who presented with a bilateral  breast mass. I recommended an excisional biopsy because history of cancer. She understood the risks and benefits of the procedure and consented on the day of surgery. PROCEDURE:     The patient was brought to the operating room in supine position with the ipsilateral arm abducted 90 degrees. The left  breast, chest and axilla were prepped and draped in usual surgical fashion. An straight saul was made over the left mass. . It was palpable. It was infiltrated with 10 cc of 1% lidocaine. A 15 scalpel was used through skin and subcutaneous tissue. Electrocautery was used for further dissection. Superior, medial, inferior, lateral flap, posterior flap were   created around the mass and removed. Wound bed was irrigated, inspected for hemostasis, closed with interrupted 3-0 Vicryl and running 4-0 Monocryl suture. Another 10 cc of 0.25% plain Marcaine was used for local anesthesia. The incision was covered by Skin Affix, 4 x 4's, paper tape. Next,  an elliptical saul was made over the left mass.  It was palpable. It was infiltrated with 5 cc of 1% lidocaine. A 15 scalpel was used through skin and subcutaneous tissue. Electrocautery was used for further dissection. Superior, medial, inferior, lateral flap, posterior flap were created around the mass and removed. Wound bed was irrigated, inspected for hemostasis, closed with interrupted 3-0 Vicryl and running 4-0 Monocryl suture. Another 10 cc of 0.25% plain Marcaine was used for local anesthesia. The incision was covered by Skin Affix, 4 x 4's, paper tape. The patient tolerated the procedure well and was transferred to recovery room in stable condition. I performed this procedure with a surgical assistant.      SIGNATURE: Milind Del Real MD PATIENT NAME: Seth Jon   DATE: 1/16/20 MRN: [id number]   TIME: 8:05 AM PHONE: [phone]

## 2020-01-16 NOTE — ANESTHESIA POSTPROCEDURE EVALUATION
Department of Anesthesiology  Postprocedure Note    Patient: Mita Brasher  MRN: [id number]  YOB: 1990  Date of evaluation: 1/16/2020  Time:  8:58 AM     Procedure Summary     Date:  01/16/20 Room / Location:  [address]    Anesthesia Start:  4366 Anesthesia Stop:      Procedure:  BILATERAL BREAST MASS EXCISIONAL BIOPSY (Bilateral Breast) Diagnosis:  (BILATERAL BREAST MASS)    Surgeon:  Bertha Poole MD Responsible Provider:  Shanique Breen MD    Anesthesia Type:  general ASA Status:  3          Anesthesia Type: general    Abisai Phase I: Abisai Score: 10    Abisai Phase II:      Last vitals: Reviewed and per EMR flowsheets.        Anesthesia Post Evaluation    Patient location during evaluation: PACU  Patient participation: complete - patient participated  Level of consciousness: awake  Pain score: 0  Airway patency: patent  Nausea & Vomiting: no vomiting and no nausea  Complications: no  Cardiovascular status: hemodynamically stable  Respiratory status: acceptable and face mask  Hydration status: stable

## 2020-01-16 NOTE — ANESTHESIA PRE PROCEDURE
Department of Anesthesiology  Preprocedure Note       Name:  Molly Lagos   Age:  26 y.o.  :  1990                                          MRN:  [id number]         Date:  2020      Surgeon: Warren Hinkle):  Aundrea Meraz MD    Procedure: BILATERAL BREAST MASS EXCISIONAL BIOPSY (39 MIN), PAT AT [facility] (Bilateral )    Medications prior to admission:   Prior to Admission medications    Medication Sig Start Date End Date Taking? Authorizing Provider   ondansetron (ZOFRAN) 4 MG tablet Take 4 mg by mouth every 8 hours as needed for Nausea or Vomiting   Yes Historical Provider, MD   OXYCONTIN 20 MG extended release tablet Take 1 tablet by mouth every twelve hours as directed 1/3/20  Yes Historical Provider, MD   LORazepam (ATIVAN) 1 MG tablet Take 0.5-1 mg by mouth every 8 hours as needed. 1/3/20 2/9/20 Yes Historical Provider, MD   methylphenidate (RITALIN) 5 MG tablet Take 10 mg by mouth 2 times daily. 19  Yes Historical Provider, MD   oxyCODONE HCl (OXY-IR) 10 MG immediate release tablet Take 1 tablet by mouth every 3 hours as needed. 19  Yes Historical Provider, MD   lidocaine-prilocaine (EMLA) 2.5-2.5 % cream Apply 1 Dose topically daily as needed 19  Yes Historical Provider, MD   lidocaine (LIDODERM) 5 % Place 1 patch onto the skin 19  Yes Historical Provider, MD   venlafaxine (EFFEXOR XR) 75 MG extended release capsule Take 1 capsule by mouth once daily.  19  Yes Historical Provider, MD   prochlorperazine (COMPAZINE) 10 MG tablet Take 1 tablet by mouth every 8 hours as needed (NAUSEA) 17  Yes Aiynaa Ulloa MD   naloxone [facility]) 4 MG/0.1ML LIQD nasal spray 1 spray by Nasal route 3/14/19   Historical Provider, MD   Compression Sleeve MISC USE AS DIRECTED 18   Historical Provider, MD   nystatin (MYCOSTATIN) 802012 UNIT/ML suspension as needed  4/3/18   Historical Provider, MD   ondansetron (ZOFRAN) 4 MG tablet Take 4 mg by mouth 17   Historical Provider, MD Current medications:    Current Facility-Administered Medications   Medication Dose Route Frequency Provider Last Rate Last Dose    ceFAZolin (ANCEF) 2 g in dextrose 3 % 50 mL IVPB (duplex)  2 g Intravenous On Call to [address] [place] [address], MD        lactated ringers infusion   Intravenous Continuous Derl Console,  mL/hr at 01/16/20 0619 1,000 mL at 01/16/20 7446     Facility-Administered Medications Ordered in Other Encounters   Medication Dose Route Frequency Provider Last Rate Last Dose    mastisol adhesive LIQD    PRN Josefina Mccauley MD   1 each at 07/17/17 2042    fentaNYL (SUBLIMAZE) injection 50 mcg  50 mcg Intravenous Q10 Min PRN Kavon Romaine, DO   50 mcg at 07/17/17 2128    HYDROmorphone (DILAUDID) injection 0.5 mg  0.5 mg Intravenous Q10 Min PRN Kavon Gavin, DO   0.5 mg at 07/17/17 2152    meperidine (DEMEROL) injection 12.5 mg  12.5 mg Intravenous Q5 Min PRN Kavon Gavin, DO           Allergies:     Allergies   Allergen Reactions    Octreotide Nausea And Vomiting     Extreme Vomiting    Cherry Hives    Lansoprazole Other (See Comments)     seizures or convulsions    Zantac [Ranitidine Hcl] Other (See Comments)     seizure       Problem List:    Patient Active Problem List   Diagnosis Code    Menometrorrhagia N92.1    Malignant neoplasm of central portion of right female breast ([facility] C50.111    Pelvic pain in female R10.2    Poor venous access I87.8    Anxiety F41.9    IBS (irritable bowel syndrome) K58.9    PCOS (polycystic ovarian syndrome) E28.2    Estrogen receptor negative tumor status Z17.1    Marijuana use F12.90    Depression with anxiety F41.8    Chest wall pain, chronic R07.89, G89.29    Perforation of intestine (HCC) K63.1    Wound of right breast S21.001A    Attention deficit hyperactivity disorder (ADHD), combined type F90.2    Cancer associated pain G89.3    Chronic midline low back pain without sciatica M54.5, G89.29    Chronic pain G89.29  Cirrhosis of liver (HCC) K74.60    Esophageal varices with bleeding (HCC) I85.01    JOANN (generalized anxiety disorder) F41.1    Hematemesis with nausea K92.0    HER2-positive carcinoma of breast (HCC) C50.919    Left hip pain M25.552    Malignant neoplasm of overlapping sites of right female breast (HCC) C50.811    Obesity, Class II, BMI 35-39.9 E66.9    Neoplastic malignant related fatigue R53.0    Opioid use agreement exists Z79.891    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    Thrombocytopenia (HCC) D69.6    Ulcer of esophagus with bleeding K22.11    Left breast mass N63.20       Past Medical History:        Diagnosis Date    [facility]    IDC Right breast BRCA1/2 neg, ERPR <1% Her 2+ Ki67 70%  multifocal G2 with multifocal G3 DCIS 4.3 cm 8/8 LN + medial margin    Chest wall pain, chronic 7/13/2017    Chronic kidney disease     [facility] 5/20/2019    Depression with anxiety 7/13/2017    Depression with anxiety     Endometriosis 7/13/2017    Estrogen receptor negative tumor status 3/28/2017    Overview:  HER-2 potitive    IBS (irritable bowel syndrome)     Marijuana use 7/13/2017    Neuropathy     Osteoarthritis     PCOS (polycystic ovarian syndrome) 7/13/2017    Prolonged emergence from general anesthesia 08/2016    trouble staying awake post-op    Restless legs syndrome     Wound of right breast 11/1/2017       Past Surgical History:        Procedure Laterality Date    ABDOMINAL EXPLORATION SURGERY  07/18/2017    repair of perforated cecum, incidental appedectomy    APPENDECTOMY  07/18/2017    incidental - done during ex-lap with repair of perforated cecum    BREAST SURGERY Right 09/22/2016    Lumpectomy and SLN    CHOLECYSTECTOMY N/A 2004    HYSTERECTOMY VAGINAL Bilateral 10/17/2016    LAVH  BILATERAL  SALPINGECTOMY performed by Bhupinder Mclain DO at [facility] HYSTEROSCOPY  05/16/2016     41.9 01/13/2020    MCV 91.1 01/13/2020    RDW 13.3 01/13/2020     01/13/2020       CMP:   Lab Results   Component Value Date     01/13/2020    K 4.6 01/13/2020     01/13/2020    CO2 25 01/13/2020    BUN 9 01/13/2020    CREATININE 0.51 01/13/2020    GFRAA >60.0 01/13/2020    LABGLOM >60.0 01/13/2020    GLUCOSE 93 01/13/2020    PROT 7.5 01/13/2020    CALCIUM 9.0 01/13/2020    BILITOT <0.2 01/13/2020    ALKPHOS 81 01/13/2020    AST 23 01/13/2020    ALT 21 01/13/2020       POC Tests: No results for input(s): POCGLU, POCNA, POCK, POCCL, POCBUN, POCHEMO, POCHCT in the last 72 hours. Coags:   Lab Results   Component Value Date    PROTIME 12.9 01/13/2020    INR 0.9 01/13/2020    APTT 30.2 01/13/2020       HCG (If Applicable):   Lab Results   Component Value Date    PREGTESTUR Negative 06/02/2016        ABGs: No results found for: PHART, PO2ART, ZEI0WIB, RRD2XRW, BEART, G2SNICZG     Type & Screen (If Applicable):  No results found for: LABABO, LABRH    Anesthesia Evaluation    Airway: Mallampati: II  TM distance: >3 FB   Neck ROM: full  Mouth opening: > = 3 FB Dental: normal exam         Pulmonary:Negative Pulmonary ROS breath sounds clear to auscultation                             Cardiovascular:Negative CV ROS            Rhythm: regular                      Neuro/Psych:   (+) psychiatric history:             ROS comment: Smoke Marijuana GI/Hepatic/Renal:   (+) PUD, liver disease: portal hypertension, esophageal varices,           Endo/Other: Negative Endo/Other ROS                    Abdominal:           Vascular: negative vascular ROS. Anesthesia Plan      general     ASA 3       Induction: rapid sequence. MIPS: Prophylactic antiemetics administered. Anesthetic plan and risks discussed with patient. Plan discussed with CRNA.     Attending anesthesiologist reviewed and agrees with Pre Eval content              Edd Simpson MD   1/16/2020

## 2020-01-16 NOTE — PROGRESS NOTES
Pt resting comfortably, denies nausea, tolerating ice chips, pt states pain much better 4/10, awaiting to call report to short stay

## 2020-01-17 ENCOUNTER — TELEPHONE (OUTPATIENT)
Dept: SURGERY | Age: 30
End: 2020-01-17

## 2020-01-17 NOTE — TELEPHONE ENCOUNTER
I called the patient post Bilat Breast Excisional Biopsy on 1-16-20. The Patient c/o pain level,\"6\", in bilat breasts prior to pain medication. . The patient verbalized taking her Oxycodone from her palliative care treatment plan with good effect. The Patient verbalized that the dressings are dry and intact. I verified the patient's post operative appointment date/time/location. The patient has no questions or concerns at this time.

## 2020-01-29 ENCOUNTER — OFFICE VISIT (OUTPATIENT)
Dept: SURGERY | Age: 30
End: 2020-01-29
Payer: COMMERCIAL

## 2020-01-29 VITALS
WEIGHT: 214 LBS | BODY MASS INDEX: 37.92 KG/M2 | SYSTOLIC BLOOD PRESSURE: 132 MMHG | HEART RATE: 98 BPM | HEIGHT: 63 IN | RESPIRATION RATE: 20 BRPM | DIASTOLIC BLOOD PRESSURE: 80 MMHG

## 2020-01-29 PROCEDURE — 99024 POSTOP FOLLOW-UP VISIT: CPT | Performed by: SURGERY

## 2020-01-29 RX ORDER — CYCLOBENZAPRINE HCL 10 MG
10 TABLET ORAL 2 TIMES DAILY PRN
COMMUNITY
Start: 2020-01-20 | End: 2020-02-03

## 2020-01-29 NOTE — PROGRESS NOTES
POSTOPERATIVE NOTE    PATIENT:  Dacia Ruiz     DATE:     1/29/2020     TIME: 12:53 PM     HISTORY AND CHIEF COMPLAINT:    Dacia Ruiz  is a 26y.o. year old  Female  status post  Right excisional chest wall biopsy and left breast mass    The pathology showed right recurrent cancer. Left side showed negative    PHYSICAL EXAMINATION:      Vitals:    01/29/20 1219   BP: 132/80   Pulse: 98   Resp: 20       Fatou Jacobson  is a 26y.o. year old  Female in no acute distress, alert and oriented x 3. Incisions: clean, dry, intact    IMPRESSION:    Status post Right excisional chest wall biopsy and left breast mass    PLAN:    Patient is doing well. Recommend clinical breast exams in the breast surgical suite in 8 week(s)  Recommend to follow up with PT / OT  Follow up with medical oncology  Follow up with radiation oncology     Patient declined PT. Made her an appointment with radiation oncology.  She has a med onc appointment already    Dictated by:  Shea Coleman MD 1/29/2020 12:53 PM

## 2020-01-31 ENCOUNTER — OFFICE VISIT (OUTPATIENT)
Dept: SURGERY | Age: 30
End: 2020-01-31

## 2020-01-31 ENCOUNTER — TELEPHONE (OUTPATIENT)
Dept: OBGYN CLINIC | Age: 30
End: 2020-01-31

## 2020-01-31 VITALS
WEIGHT: 215 LBS | DIASTOLIC BLOOD PRESSURE: 78 MMHG | TEMPERATURE: 98.5 F | SYSTOLIC BLOOD PRESSURE: 122 MMHG | RESPIRATION RATE: 20 BRPM | BODY MASS INDEX: 38.09 KG/M2 | HEART RATE: 98 BPM | HEIGHT: 63 IN

## 2020-01-31 PROCEDURE — 99024 POSTOP FOLLOW-UP VISIT: CPT | Performed by: SURGERY

## 2020-02-03 ENCOUNTER — TELEPHONE (OUTPATIENT)
Dept: SURGERY | Age: 30
End: 2020-02-03

## 2020-02-03 NOTE — TELEPHONE ENCOUNTER
PATIENT:  Jules Victoria    DATE:     2/3/2020    TELEPHONE CONVERSATION:    Jules Victoria was called regarding pathology results and follow up of left breast redness    Dictated by:  Russ Ferreira MD  2/3/2020

## 2020-02-04 ENCOUNTER — TELEPHONE (OUTPATIENT)
Dept: SURGERY | Age: 30
End: 2020-02-04

## 2020-02-04 RX ORDER — SULFAMETHOXAZOLE AND TRIMETHOPRIM 800; 160 MG/1; MG/1
1 TABLET ORAL 2 TIMES DAILY
Qty: 20 TABLET | Refills: 0 | Status: SHIPPED | OUTPATIENT
Start: 2020-02-04 | End: 2020-02-14

## 2020-02-04 NOTE — TELEPHONE ENCOUNTER
Patient calls with worsening symptoms, and states she is scheduled with her oncologist tomorrow.  Please advise

## 2020-02-10 ENCOUNTER — OFFICE VISIT (OUTPATIENT)
Dept: SURGERY | Age: 30
End: 2020-02-10
Payer: COMMERCIAL

## 2020-02-10 VITALS
HEIGHT: 63 IN | WEIGHT: 218.2 LBS | TEMPERATURE: 98.9 F | BODY MASS INDEX: 38.66 KG/M2 | RESPIRATION RATE: 18 BRPM | HEART RATE: 88 BPM | DIASTOLIC BLOOD PRESSURE: 70 MMHG | SYSTOLIC BLOOD PRESSURE: 125 MMHG

## 2020-02-10 PROCEDURE — 99024 POSTOP FOLLOW-UP VISIT: CPT | Performed by: SURGERY

## 2020-02-10 RX ORDER — LORAZEPAM 1 MG/1
.5-1 TABLET ORAL 2 TIMES DAILY PRN
COMMUNITY
Start: 2020-01-03 | End: 2021-11-29 | Stop reason: SDUPTHER

## 2020-02-10 NOTE — PROGRESS NOTES
POSTOPERATIVE NOTE    PATIENT:  Mita Brasher     DATE:     2/11/2020     TIME: 11:28 AM     HISTORY AND CHIEF COMPLAINT:    Mita Brasher  is a 26y.o. year old  Female is here about her left breast. Infection improving. She did not start her antibiotics because she had lost it. PHYSICAL EXAMINATION:         Vitals:    02/10/20 1405   BP: 125/70   Pulse: 88   Resp: 18   Temp: 98.9 °F (37.2 °C)       Mita Brasher  is a 26y.o. year old  Female in no acute distress, alert and oriented x 3. Left breast erythema improving         PLAN:    Start antibiotics.  Recommend yogurt / probiotic  F/u med onc  F/u rad onc    Dictated by:  Liset Maciel MD 2/11/2020 11:28 AM

## 2020-02-11 ENCOUNTER — TELEPHONE (OUTPATIENT)
Dept: SURGERY | Age: 30
End: 2020-02-11

## 2020-02-11 NOTE — TELEPHONE ENCOUNTER
PATIENT:  Bronson Parry    DATE:     2/11/2020    TELEPHONE CONVERSATION:    Yordan Jacobson was called regarding questions. Dr Evans Public her med onc does not want her to have surgery due to her LN.  Left msg with  for her to call us back    Dictated by:  Lalo Rodriguez MD  2/11/2020

## 2020-03-02 ENCOUNTER — TELEPHONE (OUTPATIENT)
Dept: SURGERY | Age: 30
End: 2020-03-02

## 2020-07-15 ENCOUNTER — OFFICE VISIT (OUTPATIENT)
Dept: SURGERY | Age: 30
End: 2020-07-15
Payer: COMMERCIAL

## 2020-07-15 VITALS
HEIGHT: 63 IN | WEIGHT: 244 LBS | SYSTOLIC BLOOD PRESSURE: 134 MMHG | RESPIRATION RATE: 18 BRPM | HEART RATE: 80 BPM | BODY MASS INDEX: 43.23 KG/M2 | DIASTOLIC BLOOD PRESSURE: 63 MMHG

## 2020-07-15 PROBLEM — Z17.1 MALIGNANT NEOPLASM OF OVERLAPPING SITES OF RIGHT BREAST IN FEMALE, ESTROGEN RECEPTOR NEGATIVE (HCC): Status: ACTIVE | Noted: 2020-07-15

## 2020-07-15 PROBLEM — E66.01 OBESITY, MORBID, BMI 40.0-49.9 (HCC): Status: ACTIVE | Noted: 2020-07-15

## 2020-07-15 PROBLEM — C50.811 MALIGNANT NEOPLASM OF OVERLAPPING SITES OF RIGHT BREAST IN FEMALE, ESTROGEN RECEPTOR NEGATIVE (HCC): Status: ACTIVE | Noted: 2020-07-15

## 2020-07-15 PROCEDURE — 99213 OFFICE O/P EST LOW 20 MIN: CPT | Performed by: SURGERY

## 2020-07-15 RX ORDER — CAPECITABINE 500 MG/1
4 TABLET, FILM COATED ORAL SEE ADMIN INSTRUCTIONS
COMMUNITY
Start: 2020-03-30 | End: 2021-07-23

## 2020-07-15 RX ORDER — LIDOCAINE HYDROCHLORIDE 10 MG/ML
10 INJECTION, SOLUTION EPIDURAL; INFILTRATION; INTRACAUDAL; PERINEURAL ONCE
Status: CANCELLED | OUTPATIENT
Start: 2020-07-15 | End: 2020-07-15

## 2020-07-15 RX ORDER — FENTANYL 50 UG/H
1 PATCH TRANSDERMAL
COMMUNITY
Start: 2020-07-02 | End: 2020-08-01

## 2020-07-15 RX ORDER — UREA 10 %
1 LOTION (ML) TOPICAL
COMMUNITY
Start: 2020-05-14 | End: 2021-11-11 | Stop reason: CLARIF

## 2020-07-15 RX ORDER — MIRTAZAPINE 15 MG/1
1 TABLET, FILM COATED ORAL NIGHTLY
COMMUNITY
Start: 2020-07-02 | End: 2020-08-26

## 2020-07-15 RX ORDER — GABAPENTIN 600 MG/1
1 TABLET ORAL 3 TIMES DAILY
COMMUNITY
Start: 2020-07-02 | End: 2021-11-11 | Stop reason: CLARIF

## 2020-08-26 ENCOUNTER — OFFICE VISIT (OUTPATIENT)
Dept: SURGERY | Age: 30
End: 2020-08-26
Payer: COMMERCIAL

## 2020-08-26 PROCEDURE — 99213 OFFICE O/P EST LOW 20 MIN: CPT | Performed by: SURGERY

## 2020-08-26 RX ORDER — FENTANYL 50 UG/H
1 PATCH TRANSDERMAL
COMMUNITY
Start: 2020-07-31 | End: 2020-08-30

## 2020-08-26 NOTE — PROGRESS NOTES
Reason for today's visit:  follw up     Palpates a breast change?  No, but can see some more areas on right chest, larger red area and a few up higher by clavicle  Nipple discharge: no  Breast Pain: yes - Right  Breast Mass: no  Swelling in either upper extremity? no    Wellness:    Self breast self exams: no  Regular exercise routine: yes   Following Lymphedema awareness/precautions: yes  Managing stress:yes   Stress level on a scale of 1 - 10: 5    Instruction:    Follow up per provider  Imaging per provider  Monthly self breast exams  Healthy diet  Exercise program  Lymphedema precautions/awareness    Other:    Requisitions needed:no  Bras/prosthesis: yes   Compression Sleeve/glove: no  Lymphedema Measurements: N/A  Therapy: no  PT/OT/Lymphedema: no  Follow up as advised per Dr Bob Phillips

## 2020-08-27 VITALS
WEIGHT: 236 LBS | RESPIRATION RATE: 18 BRPM | HEIGHT: 63 IN | BODY MASS INDEX: 41.82 KG/M2 | SYSTOLIC BLOOD PRESSURE: 128 MMHG | HEART RATE: 70 BPM | DIASTOLIC BLOOD PRESSURE: 63 MMHG

## 2020-08-27 NOTE — PROGRESS NOTES
FOLLOW UP NOTE    PATIENT:   Farzad Jacobson    DATE:      8/27/20    HISTORY AND CHIEF COMPLAINT:    Nichelle Bradshaw  is a 31y.o.  year old  female who presents with a complaint of  right chest wall skin lesion getting larger. PHYSICAL EXAMINATION:    Vitals:    08/26/20 1150   BP: 128/63   Pulse: 70   Resp: 18       Fatou Jacobson is a 31y.o.  year old pleasant   female in no apparent distress. The patient was examined in the seated and supine position with the motion of the pectoralis muscles bilaterally. The right chest wall has a small area of redness in the area of concern, it is blanching but SLIGHTLY LARGER THEN PREVIOUS. There is some thickening in the chest wal.  She is alert, oriented, normal speech, no focal findings or movement disorder noted        Assessment     IMPRESSION:      ICD-10-CM    1. Malignant neoplasm of overlapping sites of right breast in female, estrogen receptor negative [facility]  C50.811     Z17.1    2. Obesity, morbid, BMI 40.0-49.9 (HCC)  E66.01         PLAN:    Recommend punch biopsy and follow up with med onc and rad onc  Patient does not want biospy today  We will schedule this    Total face to face time was 15 minutes with greater than 50% spent on counseling the patient or coordinating her care. Dictated by Emmanuel Murphy MD  8/27/20     This note was partially generated using Dragon voice recognition system, and there may be some incorrect words, spellings, punctuation that were not noticed in checking the note before saving.

## 2020-09-02 ENCOUNTER — OFFICE VISIT (OUTPATIENT)
Dept: SURGERY | Age: 30
End: 2020-09-02
Payer: COMMERCIAL

## 2020-09-02 PROCEDURE — 11104 PUNCH BX SKIN SINGLE LESION: CPT | Performed by: SURGERY

## 2020-09-02 RX ORDER — LIDOCAINE HYDROCHLORIDE 10 MG/ML
8 INJECTION, SOLUTION EPIDURAL; INFILTRATION; INTRACAUDAL; PERINEURAL ONCE
Status: COMPLETED | OUTPATIENT
Start: 2020-09-02 | End: 2020-09-02

## 2020-09-02 RX ADMIN — LIDOCAINE HYDROCHLORIDE 8 ML: 10 INJECTION, SOLUTION EPIDURAL; INFILTRATION; INTRACAUDAL; PERINEURAL at 12:00

## 2020-09-02 RX ADMIN — Medication 2 MEQ: at 12:00

## 2020-09-08 ENCOUNTER — TELEPHONE (OUTPATIENT)
Dept: OBGYN CLINIC | Age: 30
End: 2020-09-08

## 2020-09-08 NOTE — TELEPHONE ENCOUNTER
The patient denies any s/s of infection at the site of the lump. She denies redness, warmth, pain or drainage at the site. The Patient denies fever or chills. The Patient verbalized that she is waiting for a return call from her Medical Oncologist, Dr. Harless Cockayne. The Patient does not want to schedule an appointment for evaluation at this time, she will watch the area, wait to hear from Dr. Harless Cockayne and return a call to the office to schedule an appointment, if she wants to be evaluated. The Patient has no further questions at this time.

## 2020-09-09 ENCOUNTER — TELEPHONE (OUTPATIENT)
Dept: SURGERY | Age: 30
End: 2020-09-09

## 2020-09-09 ENCOUNTER — OFFICE VISIT (OUTPATIENT)
Dept: SURGERY | Age: 30
End: 2020-09-09
Payer: COMMERCIAL

## 2020-09-09 VITALS
RESPIRATION RATE: 18 BRPM | HEART RATE: 66 BPM | BODY MASS INDEX: 41.14 KG/M2 | DIASTOLIC BLOOD PRESSURE: 90 MMHG | TEMPERATURE: 98 F | HEIGHT: 63 IN | SYSTOLIC BLOOD PRESSURE: 136 MMHG | WEIGHT: 232.2 LBS

## 2020-09-09 PROCEDURE — 99212 OFFICE O/P EST SF 10 MIN: CPT | Performed by: SURGERY

## 2020-09-09 RX ORDER — FENTANYL 50 UG/H
1 PATCH TRANSDERMAL
COMMUNITY
Start: 2020-08-29 | End: 2020-09-28

## 2020-09-09 RX ORDER — METHYLPHENIDATE HYDROCHLORIDE 5 MG/1
5 TABLET ORAL
COMMUNITY
Start: 2020-08-27 | End: 2020-09-26

## 2020-09-09 NOTE — TELEPHONE ENCOUNTER
PATIENT:  Nichelle Bradshaw    DATE:     9/9/2020    TELEPHONE CONVERSATION:    Farzad Jacobson was called regarding biopsy results.  Negative  Will see me today    Dictated by:  Emmanuel Murphy MD  9/9/2020

## 2020-09-10 NOTE — PROGRESS NOTES
FOLLOW UP NOTE    PATIENT:   Jose Jacobson    DATE:      9/10/20    HISTORY AND CHIEF COMPLAINT:    Brandy Dorado  is a 31y.o.  year old  female who presents for a follow up of right chest wall punch biopsy    Negative for  Cancer    She is concerned about a thickening near the biopsy site       PHYSICAL EXAMINATION:    Vitals:    09/09/20 1251   BP: (!) 136/90   Pulse: 66   Resp: 18   Temp: 98 °F (36.7 °C)        The biopsy site is healing but opened slightly. There is scar tissue near the area but no concerning masses. No evidence of infection       Assessment     IMPRESSION:      ICD-10-CM    1. Malignant neoplasm of overlapping sites of right breast in female, estrogen receptor negative [facility]  C50.811     Z17.1    2. Obesity, Class II, BMI 35-39.9  E66.9    3. Skin lesion  L98.9         PLAN:    Patient is doing well. Recommend clinical breast exams in the breast surgical suite in 3 month(s)  Recommend an active lifestyle, healthy diet, limited alcohol intake, achieve and maintain a healthy BMI to optimize breast cancer outcomes / decrease risk of breast cancer. Follow up with medical oncology    Total face to face time was 10 minutes with greater than 50% spent on counseling the patient or coordinating her care. Dictated by Jeramy Mccord MD  9/10/20     This note was partially generated using Dragon voice recognition system, and there may be some incorrect words, spellings, punctuation that were not noticed in checking the note before saving.

## 2021-07-22 NOTE — PROGRESS NOTES
2021    Fatou Jacobson (:  1990) is a 27 y.o. female, here for evaluation of the following medical concerns:  Chief Complaint   Patient presents with   [facility] Establish Care    Other     Nodule on tonsils. Thought it was a stone at first.     Stool Color Change     States pt had mucous in her stool. x1 week.  Urinary Frequency     Just yesterday. HPI  Establishing care  Last PCP was  past medical history: HER-2 positive carcinoma of right breast with skin mets, IBS, PCOS, cirrhosis of liver, ADHD, anxiety    Specialist  Dr. Nereida Hernandez, breast surgeon  Dr. Seble Dickerson, pain management    Throat nodule  Hx portal HTN with varices  Also gets tonsil stones and is usually able to get them out on her own  This nodule felt different  Present for three weeks  Mildly painful/tender with swallowing    Urinary frequency  Started last night  Endorses urinary frequency  Symptoms have improved today  Denied fevers, chills, hematuria, dysuria    Mucous in stool  Occurred over the last week  Having diarrhea x 1 week with increased mucous  Today BM was normal  BG removed at 15yo due to gallstones      Review of Systems   Constitutional: Negative for chills and fever. HENT: Positive for sore throat. Negative for congestion, postnasal drip, rhinorrhea, sinus pressure and sinus pain. Respiratory: Negative for cough and shortness of breath. Cardiovascular: Negative for chest pain and palpitations. Gastrointestinal: Positive for diarrhea. Negative for abdominal pain, blood in stool, constipation and vomiting. Genitourinary: Positive for frequency. Negative for decreased urine volume, difficulty urinating, dysuria, hematuria and urgency. Musculoskeletal: Negative for arthralgias and myalgias. Skin: Negative for rash and wound. Neurological: Negative for speech difficulty and light-headedness. Psychiatric/Behavioral: Negative for suicidal ideas. The patient is not nervous/anxious. Allergies   Allergen Reactions    Octreotide Nausea And Vomiting     Extreme Vomiting    Cherry Hives    Lansoprazole Other (See Comments)     seizures or convulsions    Zantac [Ranitidine Hcl] Other (See Comments)     seizure       Past Medical History:   Diagnosis Date    Bursitis     Cancer ([facility] 09/2016    IDC Right breast BRCA1/2 neg, ERPR <1% Her 2+ Ki67 70%  multifocal G2 with multifocal G3 DCIS 4.3 cm 8/8 LN + medial margin    Chest wall pain, chronic 7/13/2017    Chronic kidney disease     Cirrhosis of liver ([facility] 5/20/2019    Degenerative disorder of bone     Degenerative joint disease of low back     Depression with anxiety 7/13/2017    Depression with anxiety     Endometriosis 7/13/2017    Estrogen receptor negative tumor status 3/28/2017    Overview:  HER-2 potitive    IBS (irritable bowel syndrome)     Marijuana use 7/13/2017    Neuropathy     Osteoarthritis     Osteoporosis     PCOS (polycystic ovarian syndrome) 7/13/2017    Portal hypertension ([facility]     Prolonged emergence from general anesthesia 08/2016    trouble staying awake post-op    Restless legs syndrome     Scoliosis     Tendonitis of shoulder     Torn rotator cuff     Wound of right breast 11/1/2017       Past Surgical History:   Procedure Laterality Date    ABDOMINAL EXPLORATION SURGERY  07/18/2017    repair of perforated cecum, incidental appedectomy    APPENDECTOMY  07/18/2017    incidental - done during ex-lap with repair of perforated cecum    BREAST BIOPSY Bilateral 1/16/2020    BILATERAL BREAST MASS EXCISIONAL BIOPSY performed by Maggi Perkins MD at [address] [place] [address] Bilateral 01/16/2020    Excisional Breast Mass Biopsy Bilat    BREAST LUMPECTOMY Bilateral 01/16/2020    bilateral excisional biopsies (DR NAIDA POLANCO)    BREAST SURGERY Right 09/22/2016    Lumpectomy and SLN    CHOLECYSTECTOMY N/A 2004    HYSTERECTOMY VAGINAL Bilateral 10/17/2016    LAVH  BILATERAL  SALPINGECTOMY performed by (Non-Medical):    Physical Activity:     Days of Exercise per Week:     Minutes of Exercise per Session:    Stress:     Feeling of Stress :    Social Connections:     Frequency of Communication with Friends and Family:     Frequency of Social Gatherings with Friends and Family:     Attends Christian Services:     Active Member of Clubs or Organizations:     Attends Club or Organization Meetings:     Marital Status:    Intimate Partner Violence:     Fear of Current or Ex-Partner:     Emotionally Abused:     Physically Abused:     Sexually Abused:         Family History   Problem Relation Age of Onset    Heart Attack Father 50    Arthritis Mother     COPD Mother     Emphysema Mother     Cancer Mother         esophagus    Diabetes Mother     Heart Disease Mother     Breast Cancer Sister     Cervical Cancer Other     Breast Cancer Paternal Aunt     Endometrial Cancer Paternal Aunt     Stroke Maternal Grandmother     No Known Problems Maternal Grandfather     No Known Problems Paternal Grandmother     No Known Problems Paternal Grandfather     No Known Problems Sister     No Known Problems Sister     No Known Problems Sister     No Known Problems Sister     No Known Problems Daughter        Vitals:    07/23/21 0807   BP: 124/78   Pulse: 89   Temp: 98 °F (36.7 °C)   SpO2: 99%   Weight: 220 lb (99.8 kg)   Height: 5' 3\" (1.6 m)       Estimated body mass index is 38.97 kg/m² as calculated from the following:    Height as of this encounter: 5' 3\" (1.6 m). Weight as of this encounter: 220 lb (99.8 kg). No results for input(s): WBC, RBC, HGB, HCT, MCV, MCH, MCHC, RDW, PLT, MPV in the last 72 hours. No results for input(s): NA, K, CL, CO2, BUN, CREATININE, GLUCOSE, CALCIUM, PROT, LABALBU, BILITOT, ALKPHOS, AST, ALT in the last 72 hours. No results found for: LABA1C    No results found. Physical Exam  Constitutional:       Appearance: Normal appearance. She is normal weight. HENT:      Head: Normocephalic and atraumatic. Mouth/Throat:      Comments: Small white nodule on the left tonsil fossa no surrounding erythema  Eyes:      Extraocular Movements: Extraocular movements intact. Conjunctiva/sclera: Conjunctivae normal.   Cardiovascular:      Rate and Rhythm: Normal rate and regular rhythm. Pulses: Normal pulses. Heart sounds: Normal heart sounds. Pulmonary:      Effort: Pulmonary effort is normal.      Breath sounds: Normal breath sounds. No wheezing or rales. Abdominal:      Palpations: Abdomen is soft. Tenderness: There is abdominal tenderness (Right upper quadrant and left upper quadrants, mild tenderness to palpation). There is no guarding or rebound. Skin:     General: Skin is warm. Capillary Refill: Capillary refill takes less than 2 seconds. Findings: No rash. Neurological:      Mental Status: She is alert. Psychiatric:         Mood and Affect: Mood normal.         Thought Content: Thought content normal.         Judgment: Judgment normal.         ASSESSMENT/PLAN:  1. Tonsillar cyst  -Appears to be possible keratin cyst of left tonsillar fossa   -We will refer to ENT for further management    - ROBERTO CARLOS - Newton Read MD, Otolaryngology, [facility]    2. Urinary frequency  -UA nonsuggestive of infection, will send urine for culture and call patient with results    - POCT Urinalysis no Micro  - Culture, Urine; Future    3.  Irritable bowel syndrome with diarrhea  -1 week of diarrhea with increased mucus in the stool  -Bowel movement was normal this morning  -Previously had issues with IBS however when she was placed on pain medicines she started to have normal bowel movements  -She has been titrating off of her pain medicines as she is getting ready to see Dr. Esme Marcum with pain management  -Possibly related to decreased opioids  -We will have her keep a log over the next 2 weeks and follow-up to see if symptoms have

## 2021-07-23 ENCOUNTER — OFFICE VISIT (OUTPATIENT)
Dept: FAMILY MEDICINE CLINIC | Age: 31
End: 2021-07-23
Payer: COMMERCIAL

## 2021-07-23 VITALS
HEIGHT: 63 IN | BODY MASS INDEX: 38.98 KG/M2 | DIASTOLIC BLOOD PRESSURE: 78 MMHG | HEART RATE: 89 BPM | SYSTOLIC BLOOD PRESSURE: 124 MMHG | WEIGHT: 220 LBS | OXYGEN SATURATION: 99 % | TEMPERATURE: 98 F

## 2021-07-23 DIAGNOSIS — K74.60 HEPATIC CIRRHOSIS, UNSPECIFIED HEPATIC CIRRHOSIS TYPE, UNSPECIFIED WHETHER ASCITES PRESENT (HCC): ICD-10-CM

## 2021-07-23 DIAGNOSIS — J35.8 TONSILLAR CYST: Primary | ICD-10-CM

## 2021-07-23 DIAGNOSIS — C50.111 MALIGNANT NEOPLASM OF CENTRAL PORTION OF RIGHT BREAST IN FEMALE, ESTROGEN RECEPTOR NEGATIVE (HCC): Chronic | ICD-10-CM

## 2021-07-23 DIAGNOSIS — F90.2 ATTENTION DEFICIT HYPERACTIVITY DISORDER (ADHD), COMBINED TYPE: ICD-10-CM

## 2021-07-23 DIAGNOSIS — Z17.1 MALIGNANT NEOPLASM OF CENTRAL PORTION OF RIGHT BREAST IN FEMALE, ESTROGEN RECEPTOR NEGATIVE (HCC): Chronic | ICD-10-CM

## 2021-07-23 DIAGNOSIS — E28.2 PCOS (POLYCYSTIC OVARIAN SYNDROME): Chronic | ICD-10-CM

## 2021-07-23 DIAGNOSIS — Z76.89 ENCOUNTER TO ESTABLISH CARE WITH NEW DOCTOR: ICD-10-CM

## 2021-07-23 DIAGNOSIS — R35.0 URINARY FREQUENCY: ICD-10-CM

## 2021-07-23 DIAGNOSIS — K58.0 IRRITABLE BOWEL SYNDROME WITH DIARRHEA: Chronic | ICD-10-CM

## 2021-07-23 DIAGNOSIS — F41.9 ANXIETY: Chronic | ICD-10-CM

## 2021-07-23 LAB
BILIRUBIN, POC: NORMAL
BLOOD URINE, POC: NORMAL
CLARITY, POC: CLEAR
COLOR, POC: YELLOW
GLUCOSE URINE, POC: NORMAL
KETONES, POC: NORMAL
LEUKOCYTE EST, POC: NORMAL
NITRITE, POC: NORMAL
PH, POC: 6
PROTEIN, POC: NORMAL
SPECIFIC GRAVITY, POC: 1.02
UROBILINOGEN, POC: 3.5

## 2021-07-23 PROCEDURE — 99204 OFFICE O/P NEW MOD 45 MIN: CPT | Performed by: STUDENT IN AN ORGANIZED HEALTH CARE EDUCATION/TRAINING PROGRAM

## 2021-07-23 PROCEDURE — 81002 URINALYSIS NONAUTO W/O SCOPE: CPT | Performed by: STUDENT IN AN ORGANIZED HEALTH CARE EDUCATION/TRAINING PROGRAM

## 2021-07-23 RX ORDER — OXYCODONE HYDROCHLORIDE 30 MG/1
TABLET, FILM COATED, EXTENDED RELEASE ORAL
COMMUNITY
Start: 2021-05-18 | End: 2021-11-11 | Stop reason: CLARIF

## 2021-07-23 RX ORDER — DEXTROAMPHETAMINE SACCHARATE, AMPHETAMINE ASPARTATE MONOHYDRATE, DEXTROAMPHETAMINE SULFATE AND AMPHETAMINE SULFATE 7.5; 7.5; 7.5; 7.5 MG/1; MG/1; MG/1; MG/1
CAPSULE, EXTENDED RELEASE ORAL
COMMUNITY
Start: 2021-07-09 | End: 2021-10-27 | Stop reason: SDUPTHER

## 2021-07-23 RX ORDER — DEXTROAMPHETAMINE SACCHARATE, AMPHETAMINE ASPARTATE, DEXTROAMPHETAMINE SULFATE AND AMPHETAMINE SULFATE 5; 5; 5; 5 MG/1; MG/1; MG/1; MG/1
TABLET ORAL
COMMUNITY
Start: 2021-07-09 | End: 2021-10-27 | Stop reason: SDUPTHER

## 2021-07-23 RX ORDER — VENLAFAXINE HYDROCHLORIDE 37.5 MG/1
CAPSULE, EXTENDED RELEASE ORAL
COMMUNITY
Start: 2021-07-09 | End: 2021-11-11 | Stop reason: CLARIF

## 2021-07-23 SDOH — ECONOMIC STABILITY: FOOD INSECURITY: WITHIN THE PAST 12 MONTHS, YOU WORRIED THAT YOUR FOOD WOULD RUN OUT BEFORE YOU GOT MONEY TO BUY MORE.: NEVER TRUE

## 2021-07-23 SDOH — ECONOMIC STABILITY: FOOD INSECURITY: WITHIN THE PAST 12 MONTHS, THE FOOD YOU BOUGHT JUST DIDN'T LAST AND YOU DIDN'T HAVE MONEY TO GET MORE.: NEVER TRUE

## 2021-07-23 ASSESSMENT — ENCOUNTER SYMPTOMS
VOMITING: 0
SHORTNESS OF BREATH: 0
SORE THROAT: 1
COUGH: 0
CONSTIPATION: 0
BLOOD IN STOOL: 0
ABDOMINAL PAIN: 0
RHINORRHEA: 0
SINUS PRESSURE: 0
SINUS PAIN: 0
DIARRHEA: 1

## 2021-07-23 ASSESSMENT — SOCIAL DETERMINANTS OF HEALTH (SDOH): HOW HARD IS IT FOR YOU TO PAY FOR THE VERY BASICS LIKE FOOD, HOUSING, MEDICAL CARE, AND HEATING?: NOT HARD AT ALL

## 2021-07-23 NOTE — PATIENT INSTRUCTIONS
Patient Education        Irritable Bowel Syndrome: Care Instructions  Your Care Instructions  Irritable bowel syndrome, or IBS, is a problem with the intestines that causes belly pain, bloating, gas, constipation, and diarrhea. The cause of IBS is not well known. IBS can last for many years, but it does not get worse over time or lead to serious disease. Most people can control their symptoms by changing their diet and reducing stress. Follow-up care is a key part of your treatment and safety. Be sure to make and go to all appointments, and call your doctor if you are having problems. It's also a good idea to know your test results and keep a list of the medicines you take. How can you care for yourself at home? · Prevent diarrhea:  ? Limit the amount of high-fiber foods you eat. This includes vegetables, fruits, whole-grain breads and pasta, high-fiber cereal, and brown rice. ? Limit dairy products. ? Limit artificial sweeteners such as sorbitol and xylitol. · Avoid constipation:  ? Include fruits, vegetables, beans, and whole grains in your diet each day. These foods are high in fiber. ? Drink plenty of fluids. If you have kidney, heart, or liver disease and have to limit fluids, talk with your doctor before you increase the amount of fluids you drink. ? Get some exercise every day. Build up slowly to 30 to 60 minutes a day on 5 or more days of the week. ? Take a fiber supplement, such as Citrucel or Metamucil, every day if needed. Read and follow all instructions on the label. ? Schedule time each day for a bowel movement. Having a daily routine may help. Take your time and do not strain when having a bowel movement. · To help relieve bloating or gas, avoid foods such as beans, cabbage, cauliflower, or broccoli. · Keep a daily diary of what you eat and what symptoms you have. This may help find foods that cause you problems. · Eat slowly. Try to make mealtime relaxing.   · Find ways to reduce stress. When should you call for help? Call your doctor now or seek immediate medical care if:    · Your pain is different than usual or occurs with fever.     · You lose weight without trying, or you lose your appetite and you do not know why.     · Your symptoms often wake you from sleep.     · Your stools are black and tarlike or have streaks of blood. Watch closely for changes in your health, and be sure to contact your doctor if:    · Your IBS symptoms get worse or begin to disrupt your day-to-day life.     · You become more tired than usual.     · Your home treatment stops working. Where can you learn more? Go to [email or web address] and sign in to your [employer] account. Enter Q971 in the ItsOn box to learn more about \"Irritable Bowel Syndrome: Care Instructions. \"     If you do not have an account, please click on the \"Sign Up Now\" link. Current as of: February 10, 2021               Content Version: 12.9  © 2006-2021 Healthwise, Incorporated. Care instructions adapted under license by [facility] ([facility]). If you have questions about a medical condition or this instruction, always ask your healthcare professional. [place] [address] any warranty or liability for your use of this information.

## 2021-07-25 LAB — URINE CULTURE, ROUTINE: NORMAL

## 2021-08-21 ENCOUNTER — HOSPITAL ENCOUNTER (EMERGENCY)
Age: 31
Discharge: HOME | End: 2021-08-21
Attending: EMERGENCY MEDICINE
Payer: COMMERCIAL

## 2021-08-21 ENCOUNTER — APPOINTMENT (OUTPATIENT)
Dept: GENERAL RADIOLOGY | Age: 31
End: 2021-08-21
Payer: COMMERCIAL

## 2021-08-21 VITALS
HEIGHT: 63 IN | HEART RATE: 106 BPM | WEIGHT: 215 LBS | DIASTOLIC BLOOD PRESSURE: 95 MMHG | OXYGEN SATURATION: 96 % | SYSTOLIC BLOOD PRESSURE: 149 MMHG | BODY MASS INDEX: 38.09 KG/M2 | RESPIRATION RATE: 18 BRPM | TEMPERATURE: 97.4 F

## 2021-08-21 DIAGNOSIS — F32.A DEPRESSION, UNSPECIFIED DEPRESSION TYPE: Primary | ICD-10-CM

## 2021-08-21 DIAGNOSIS — M54.40 ACUTE BILATERAL LOW BACK PAIN WITH SCIATICA, SCIATICA LATERALITY UNSPECIFIED: ICD-10-CM

## 2021-08-21 LAB
BILIRUBIN URINE: NEGATIVE
BLOOD, URINE: NEGATIVE
CLARITY: CLEAR
COLOR: YELLOW
GLUCOSE URINE: NEGATIVE MG/DL
KETONES, URINE: NEGATIVE MG/DL
LEUKOCYTE ESTERASE, URINE: NEGATIVE
NITRITE, URINE: NEGATIVE
PH UA: 7 (ref 5–9)
PROTEIN UA: NEGATIVE MG/DL
SPECIFIC GRAVITY UA: 1.02 (ref 1–1.03)
URINE REFLEX TO CULTURE: NORMAL
UROBILINOGEN, URINE: 0.2 E.U./DL

## 2021-08-21 PROCEDURE — 81003 URINALYSIS AUTO W/O SCOPE: CPT

## 2021-08-21 PROCEDURE — 6360000002 HC RX W HCPCS: Performed by: EMERGENCY MEDICINE

## 2021-08-21 PROCEDURE — 72110 X-RAY EXAM L-2 SPINE 4/>VWS: CPT

## 2021-08-21 PROCEDURE — 99283 EMERGENCY DEPT VISIT LOW MDM: CPT

## 2021-08-21 PROCEDURE — 6370000000 HC RX 637 (ALT 250 FOR IP): Performed by: EMERGENCY MEDICINE

## 2021-08-21 PROCEDURE — 96372 THER/PROPH/DIAG INJ SC/IM: CPT

## 2021-08-21 RX ORDER — TIZANIDINE 4 MG/1
TABLET ORAL
COMMUNITY
Start: 2021-07-26 | End: 2022-08-01

## 2021-08-21 RX ORDER — CYCLOBENZAPRINE HCL 10 MG
10 TABLET ORAL ONCE
Status: COMPLETED | OUTPATIENT
Start: 2021-08-21 | End: 2021-08-21

## 2021-08-21 RX ORDER — KETOROLAC TROMETHAMINE 30 MG/ML
60 INJECTION, SOLUTION INTRAMUSCULAR; INTRAVENOUS ONCE
Status: COMPLETED | OUTPATIENT
Start: 2021-08-21 | End: 2021-08-21

## 2021-08-21 RX ORDER — METHADONE HYDROCHLORIDE 5 MG/1
TABLET ORAL
COMMUNITY
Start: 2021-07-26 | End: 2021-11-11 | Stop reason: CLARIF

## 2021-08-21 RX ORDER — PREGABALIN 50 MG/1
CAPSULE ORAL
COMMUNITY
Start: 2021-07-26 | End: 2021-11-11 | Stop reason: CLARIF

## 2021-08-21 RX ADMIN — KETOROLAC TROMETHAMINE 60 MG: 30 INJECTION, SOLUTION INTRAMUSCULAR at 16:02

## 2021-08-21 RX ADMIN — CYCLOBENZAPRINE 10 MG: 10 TABLET, FILM COATED ORAL at 16:02

## 2021-08-21 ASSESSMENT — PAIN SCALES - GENERAL
PAINLEVEL_OUTOF10: 8
PAINLEVEL_OUTOF10: 8

## 2021-08-21 ASSESSMENT — ENCOUNTER SYMPTOMS
EYE DISCHARGE: 0
BACK PAIN: 1
ABDOMINAL PAIN: 0
BOWEL INCONTINENCE: 0
ABDOMINAL SWELLING: 0
VOMITING: 0
SORE THROAT: 0
SHORTNESS OF BREATH: 0
COUGH: 0
NAUSEA: 0
EYE REDNESS: 0

## 2021-08-21 ASSESSMENT — PAIN DESCRIPTION - ORIENTATION: ORIENTATION: MID;LOWER

## 2021-08-21 ASSESSMENT — PAIN DESCRIPTION - LOCATION: LOCATION: BACK

## 2021-08-21 ASSESSMENT — PAIN DESCRIPTION - FREQUENCY: FREQUENCY: CONTINUOUS

## 2021-08-21 ASSESSMENT — PAIN DESCRIPTION - DESCRIPTORS: DESCRIPTORS: ACHING;SHARP

## 2021-08-21 ASSESSMENT — PAIN DESCRIPTION - PAIN TYPE: TYPE: ACUTE PAIN

## 2021-08-21 NOTE — ED PROVIDER NOTES
[facility] ED  EMERGENCY DEPARTMENT ENCOUNTER      Pt Name: Fernando Webb  MRN: [id number]  [place] 1990  Date of evaluation: 8/21/2021  Provider: Juliet Humphries DO        HISTORY OF PRESENT ILLNESS    Fernando Webb is a 27 y.o. female per chart review has ah/o bursitis, cancer, chest wall pain, breast cancer, CKD, cirrhosis of the liver, Depression, endometriosis, IBS, marijuana use, DJD, depression, obesity. The history is provided by the patient. Back Pain  Location:  Lumbar spine  Quality:  Aching  Radiates to:  Does not radiate  Pain severity:  Severe  Onset quality:  Sudden  Timing:  Constant  Progression:  Unchanged  Chronicity:  Chronic  Relieved by:  Nothing  Worsened by:  Nothing  Associated symptoms: no abdominal pain, no abdominal swelling, no bladder incontinence, no bowel incontinence, no chest pain, no dysuria, no fever, no headaches, no leg pain, no numbness, no paresthesias, no perianal numbness, no tingling, no weakness and no weight loss    Risk factors: hx of cancer and obesity             REVIEW OF SYSTEMS       Review of Systems   Constitutional: Negative for chills, fever and weight loss. HENT: Negative for ear pain and sore throat. Eyes: Negative for discharge and redness. Respiratory: Negative for cough and shortness of breath. Cardiovascular: Negative for chest pain and palpitations. Gastrointestinal: Negative for abdominal pain, bowel incontinence, nausea and vomiting. Genitourinary: Negative for bladder incontinence, difficulty urinating and dysuria. Musculoskeletal: Positive for back pain. Negative for neck pain. Skin: Negative for rash and wound. Neurological: Negative for dizziness, tingling, syncope, weakness, numbness, headaches and paresthesias. Psychiatric/Behavioral: Negative for confusion. The patient is not nervous/anxious. All other systems reviewed and are negative.       Except as noted above the remainder of the review of systems was reviewed and negative.        PAST MEDICAL HISTORY     Past Medical History:   Diagnosis Date    Bursitis     Cancer ([facility] 09/2016    IDC Right breast BRCA1/2 neg, ERPR <1% Her 2+ Ki67 70%  multifocal G2 with multifocal G3 DCIS 4.3 cm 8/8 LN + medial margin    Chest wall pain, chronic 7/13/2017    Chronic kidney disease     Cirrhosis of liver ([facility] 5/20/2019    Degenerative disorder of bone     Degenerative joint disease of low back     Depression with anxiety 7/13/2017    Depression with anxiety     Endometriosis 7/13/2017    Estrogen receptor negative tumor status 3/28/2017    Overview:  HER-2 potitive    IBS (irritable bowel syndrome)     Marijuana use 7/13/2017    Neuropathy     Osteoarthritis     Osteoporosis     PCOS (polycystic ovarian syndrome) 7/13/2017    Portal hypertension ([facility]     Prolonged emergence from general anesthesia 08/2016    trouble staying awake post-op    Restless legs syndrome     Scoliosis     Tendonitis of shoulder     Torn rotator cuff     Wound of right breast 11/1/2017         SURGICAL HISTORY       Past Surgical History:   Procedure Laterality Date    ABDOMINAL EXPLORATION SURGERY  07/18/2017    repair of perforated cecum, incidental appedectomy    APPENDECTOMY  07/18/2017    incidental - done during ex-lap with repair of perforated cecum    BREAST BIOPSY Bilateral 1/16/2020    BILATERAL BREAST MASS EXCISIONAL BIOPSY performed by Lisseth De Paz MD at [facility] Bilateral 01/16/2020    Excisional Breast Mass Biopsy Bilat    BREAST LUMPECTOMY Bilateral 01/16/2020    bilateral excisional biopsies (DR NAIDA POLANCO)    BREAST SURGERY Right 09/22/2016    Lumpectomy and SLN    CHOLECYSTECTOMY N/A 2004    HYSTERECTOMY VAGINAL Bilateral 10/17/2016    LAVH  BILATERAL  SALPINGECTOMY performed by Toañ Pelayo DO at [facility] HYSTEROSCOPY  05/16/2016    DR WILLARD    HYSTEROSCOPY  08/22/2016    FRACTIONAL D&C-OPERATIVE LAPAROSCOPY    INSERTION / REMOVAL / REPLACEMENT VENOUS ACCESS CATHETER N/A 10/27/2016    PLACEMENT VENOUS ACCESS DEVICE , PAT DONE 10-23 [facility] 7/17/2017    MASTECTOMY Right 10/17/2016    INJECTION METHYLENE BLUE RE-EXCISION RIGHT BREAST CANCER SITE, RIGHT SENTINEL NODE BIOPSY performed by Lana Carreon MD at [facility] Right 04/04/2017    OTHER SURGICAL HISTORY Right 08/15/2017    EXCISION OF CHEST WALL MASS (DR POLANCO @ [facility])         CURRENT MEDICATIONS       Discharge Medication List as of 8/21/2021  5:03 PM      CONTINUE these medications which have NOT CHANGED    Details   methadone (DOLOPHINE) 5 MG tablet Take 1 tablet by mouth once a dayHistorical Med      tiZANidine (ZANAFLEX) 4 MG tablet TAKE 1 TABLET BY MOUTH THREE TIMES DAILY AS DIRECTEDHistorical Med      pregabalin (LYRICA) 50 MG capsule TAKE 1 CAPSULE BY MOUTH THREE TIMES DAILYHistorical Med      amphetamine-dextroamphetamine (ADDERALL XR) 30 MG extended release capsule TAKE 1 CAPSULE BY MOUTH EVERY DAYHistorical Med      amphetamine-dextroamphetamine (ADDERALL) 20 MG tablet take ONE-HALF to 1 (ONE) TABLET BY MOUTH TWICE DAILY AS NEEDEDHistorical Med      diclofenac sodium (VOLTAREN) 1 % GEL Apply 2 grams to affected area 4 times a day as needed, Historical Med      venlafaxine (EFFEXOR XR) 37.5 MG extended release capsule TAKE 1 CAPSULE BY MOUTH EVERY DAYHistorical Med      UREA 10 HYDRATING 10 % cream Apply 1 Dose topically Twice a Week, DAWHistorical Med      LORazepam (ATIVAN) 1 MG tablet Take 0.5-1 mg by mouth 2 times daily as needed. Historical Med      lidocaine-prilocaine (EMLA) 2.5-2.5 % cream Apply 1 Dose topically daily as needed, Topical, DAILY PRN Starting Wed 8/14/2019, Historical Med      lidocaine (LIDODERM) 5 % Place 1 patch onto the skinHistorical Med      naloxone (NARCAN) 4 MG/0.1ML LIQD nasal spray 1 spray by Nasal routeHistorical Med      Compression Cardiovascular:      Rate and Rhythm: Normal rate and regular rhythm. Pulses: Normal pulses. Heart sounds: Normal heart sounds. Pulmonary:      Effort: Pulmonary effort is normal.      Breath sounds: Normal breath sounds. Abdominal:      General: Abdomen is flat. Bowel sounds are normal.      Palpations: Abdomen is soft. Musculoskeletal:      Cervical back: Full passive range of motion without pain, normal range of motion and neck supple. Lumbar back: Spasms present. Decreased range of motion. Skin:     General: Skin is warm. Capillary Refill: Capillary refill takes less than 2 seconds. Neurological:      General: No focal deficit present. Mental Status: She is alert and oriented to person, place, and time. Deep Tendon Reflexes: Reflexes are normal and symmetric. Psychiatric:         Attention and Perception: Attention and perception normal.         Mood and Affect: Mood normal.         Behavior: Behavior normal. Behavior is cooperative. LABS:  Labs Reviewed   URINE RT REFLEX TO CULTURE         MDM:   Vitals:    Vitals:    08/21/21 1449 08/21/21 1451   BP: (!) 149/95    Pulse: 106    Resp: 18    Temp: 97.4 °F (36.3 °C)    TempSrc: Oral    SpO2: 96%    Weight:  215 lb (97.5 kg)   Height: 5' 3\" (1.6 m)        MDM  Number of Diagnoses or Management Options  Acute bilateral low back pain with sciatica, sciatica laterality unspecified  Depression, unspecified depression type  Diagnosis management comments: Patient presents with back pain exacerbation. Lumbar xray and toradol 60mg IM, flexeril 10mg PO. No acute changes. UA: negative. She feels much better and will be discharged home. She will follow up in 2 days with her primary care doctor.         Amount and/or Complexity of Data Reviewed  Clinical lab tests: ordered and reviewed  Tests in the radiology section of CPT®: ordered and reviewed  Tests in the medicine section of CPT®: ordered and reviewed

## 2021-08-21 NOTE — ED TRIAGE NOTES
Patient in with mid lower back pain that started yesterday. She has recently diagnosed with fibro and has degenerative disc disease.

## 2021-10-27 ENCOUNTER — VIRTUAL VISIT (OUTPATIENT)
Dept: FAMILY MEDICINE CLINIC | Age: 31
End: 2021-10-27
Payer: COMMERCIAL

## 2021-10-27 DIAGNOSIS — F41.9 ANXIETY: ICD-10-CM

## 2021-10-27 DIAGNOSIS — F90.2 ATTENTION DEFICIT HYPERACTIVITY DISORDER (ADHD), COMBINED TYPE: Primary | ICD-10-CM

## 2021-10-27 PROCEDURE — 99213 OFFICE O/P EST LOW 20 MIN: CPT | Performed by: NURSE PRACTITIONER

## 2021-10-27 RX ORDER — LORAZEPAM 1 MG/1
.5-1 TABLET ORAL 2 TIMES DAILY PRN
Status: CANCELLED | OUTPATIENT
Start: 2021-10-27

## 2021-10-27 RX ORDER — DEXTROAMPHETAMINE SACCHARATE, AMPHETAMINE ASPARTATE MONOHYDRATE, DEXTROAMPHETAMINE SULFATE AND AMPHETAMINE SULFATE 7.5; 7.5; 7.5; 7.5 MG/1; MG/1; MG/1; MG/1
CAPSULE, EXTENDED RELEASE ORAL
Qty: 30 CAPSULE | Refills: 0 | Status: SHIPPED | OUTPATIENT
Start: 2021-10-27 | End: 2022-01-04 | Stop reason: ALTCHOICE

## 2021-10-27 RX ORDER — DEXTROAMPHETAMINE SACCHARATE, AMPHETAMINE ASPARTATE, DEXTROAMPHETAMINE SULFATE AND AMPHETAMINE SULFATE 5; 5; 5; 5 MG/1; MG/1; MG/1; MG/1
TABLET ORAL
Qty: 30 TABLET | Refills: 0 | Status: SHIPPED | OUTPATIENT
Start: 2021-10-27 | End: 2022-01-04 | Stop reason: SDUPTHER

## 2021-10-27 ASSESSMENT — ENCOUNTER SYMPTOMS
COUGH: 0
SHORTNESS OF BREATH: 0

## 2021-11-10 ENCOUNTER — TELEPHONE (OUTPATIENT)
Dept: FAMILY MEDICINE CLINIC | Age: 31
End: 2021-11-10

## 2021-11-10 NOTE — TELEPHONE ENCOUNTER
Pt calls requesting a referral to a kidney specialist due to Stage 4 Breast Cancer. Pt had all imaging and labs done at [facility]. Pt:[phone]    Pt aware David Rich is out of office until Monday and would like David Rich to review all records.

## 2021-11-11 ENCOUNTER — TELEPHONE (OUTPATIENT)
Dept: FAMILY MEDICINE CLINIC | Age: 31
End: 2021-11-11

## 2021-11-11 ENCOUNTER — VIRTUAL VISIT (OUTPATIENT)
Dept: FAMILY MEDICINE CLINIC | Age: 31
End: 2021-11-11
Payer: COMMERCIAL

## 2021-11-11 DIAGNOSIS — F41.9 ANXIETY: Primary | ICD-10-CM

## 2021-11-11 PROCEDURE — 99442 PR PHYS/QHP TELEPHONE EVALUATION 11-20 MIN: CPT | Performed by: INTERNAL MEDICINE

## 2021-11-11 RX ORDER — PREGABALIN 100 MG/1
100 CAPSULE ORAL 3 TIMES DAILY PRN
COMMUNITY
End: 2022-08-01

## 2021-11-11 RX ORDER — METHADONE HYDROCHLORIDE 5 MG/1
2.5 TABLET ORAL DAILY PRN
COMMUNITY

## 2021-11-11 ASSESSMENT — ENCOUNTER SYMPTOMS
ABDOMINAL PAIN: 0
EYE PAIN: 0
SHORTNESS OF BREATH: 0
BACK PAIN: 0

## 2021-11-11 NOTE — PROGRESS NOTES
Subjective:      Patient ID: Dina Mcduffie is a 27 y.o. female who presents today with:  No chief complaint on file. HPI     Patient here  No longer under the care of [facility] care  Was told she wouldn't be able to receive lorazepam from them.      Past Medical History:   Diagnosis Date    Bursitis     Cancer ([facility] 09/2016    IDC Right breast BRCA1/2 neg, ERPR <1% Her 2+ Ki67 70%  multifocal G2 with multifocal G3 DCIS 4.3 cm 8/8 LN + medial margin    Chest wall pain, chronic 7/13/2017    Chronic kidney disease     Cirrhosis of liver ([facility] 5/20/2019    Degenerative disorder of bone     Degenerative joint disease of low back     Depression with anxiety 7/13/2017    Depression with anxiety     Endometriosis 7/13/2017    Estrogen receptor negative tumor status 3/28/2017    Overview:  HER-2 potitive    IBS (irritable bowel syndrome)     Marijuana use 7/13/2017    Neuropathy     Osteoarthritis     Osteoporosis     PCOS (polycystic ovarian syndrome) 7/13/2017    Portal hypertension ([facility]     Prolonged emergence from general anesthesia 08/2016    trouble staying awake post-op    Restless legs syndrome     Scoliosis     Tendonitis of shoulder     Torn rotator cuff     Wound of right breast 11/1/2017     Past Surgical History:   Procedure Laterality Date    ABDOMINAL EXPLORATION SURGERY  07/18/2017    repair of perforated cecum, incidental appedectomy    APPENDECTOMY  07/18/2017    incidental - done during ex-lap with repair of perforated cecum    BREAST BIOPSY Bilateral 1/16/2020    BILATERAL BREAST M[facility]    Excisional Breast Mass Biopsy Bilat    BREAST LUMPECTOMY Bilateral 01/16/2020    bilateral excisional biopsies (DR NAIDA POLANCO)    BREAST SURGERY Right 09/22/2016    Lumpectomy and SLN    CHOLECYSTECTOMY N/A 2004    HYSTERECTOMY VAGINAL Bilateral 10/17/2016    LAVH  BILATERAL  SALPINGECTOMY performed by Dorie Dennis DO at [facility] HYSTEROSCOPY  2016    DR Denisse Walden    HYSTEROSCOPY  2016    FRACTIONAL D&C-OPERATIVE LAPAROSCOPY    INSERTION / REMOVAL / REPLACEMENT VENOUS ACCESS CATHETER N/A 10/27/2016    PLACEMENT VENOUS ACCESS DEVICE , PAT DONE 10-23 performed by Harjinder Rm MD at [address] N/A 2017    MASTECTOMY Right 10/17/2016    INJECTION METHYLENE BLUE RE-EXCISION RIGHT BREAST CANCER SITE, RIGHT SENTINEL NODE BIOPSY performed by Harjinder Rm MD at [address] [facility], [address] [place] [address] Right 2017    OTHER SURGICAL HISTORY Right 08/15/2017    EXCISION OF CHEST WALL MASS (DR POLANCO @ [facility])     Social History     Socioeconomic History    Marital status:      Spouse name: Gillian Holder Number of children: Not on file    Years of education: Not on file    Highest education level: Not on file   Occupational History    Occupation: nurses aide   Tobacco Use    Smoking status: Former Smoker     Packs/day: 1.00     Years: 4.00     Pack years: 4.00     Types: Cigarettes     Start date:      Quit date: 2010     Years since quittin.2    Smokeless tobacco: Never Used   Vaping Use    Vaping Use: Never used   Substance and Sexual Activity    Alcohol use: Yes     Comment: occassional    Drug use: Yes     Types: Marijuana (Weed)     Comment: daily marijuana use    Sexual activity: Yes     Partners: Male   Other Topics Concern    Not on file   Social History Narrative    Lives with  and daughter        2 cats    1 dog    1 hamster    10 chickens        Likes to coupon     Social Determinants of Health     Financial Resource Strain: Low Risk     Difficulty of Paying Living Expenses: Not hard at all   Food Insecurity: No Food Insecurity    Worried About Running Out of Food in the Last Year: Never true    Kelli of Food in the Last Year: Never true   Transportation Needs:     Lack of Transportation (Medical):  Not on file    Lack of Transportation (Non-Medical): Not on file   Physical Activity:     Days of Exercise per Week: Not on file    Minutes of Exercise per Session: Not on file   Stress:     Feeling of Stress : Not on file   Social Connections:     Frequency of Communication with Friends and Family: Not on file    Frequency of Social Gatherings with Friends and Family: Not on file    Attends Sikh Services: Not on file    Active Member of [employer] or Organizations: Not on file    Attends Club or Organization Meetings: Not on file    Marital Status: Not on file   Intimate Partner Violence:     Fear of Current or Ex-Partner: Not on file    Emotionally Abused: Not on file    Physically Abused: Not on file    Sexually Abused: Not on file   Housing Stability:     Unable to Pay for Housing in the Last Year: Not on file    Number of [place] in the Last Year: Not on file    Unstable Housing in the Last Year: Not on file     Allergies   Allergen Reactions    Octreotide Nausea And Vomiting     Extreme Vomiting    Cherry Hives    Lansoprazole Other (See Comments)     seizures or convulsions    Zantac [Ranitidine Hcl] Other (See Comments)     seizure     Current Outpatient Medications on File Prior to Visit   Medication Sig Dispense Refill    methadone (DOLOPHINE) 5 MG tablet Take 5 mg by mouth daily as needed for Pain.  pregabalin (LYRICA) 100 MG capsule Take 100 mg by mouth 3 times daily as needed (pain).       amphetamine-dextroamphetamine (ADDERALL) 20 MG tablet take ONE-HALF to 1 (ONE) TABLET BY MOUTH once daily 30 tablet 0    amphetamine-dextroamphetamine (ADDERALL XR) 30 MG extended release capsule TAKE 1 CAPSULE BY MOUTH EVERY DAY 30 capsule 0    tiZANidine (ZANAFLEX) 4 MG tablet TAKE 1 TABLET BY MOUTH THREE TIMES DAILY AS DIRECTED      diclofenac sodium (VOLTAREN) 1 % GEL Apply 2 grams to affected area 4 times a day as needed      LORazepam (ATIVAN) 1 MG tablet Take 0.5-1 mg by mouth 2 times daily as needed.  lidocaine-prilocaine (EMLA) 2.5-2.5 % cream Apply 1 Dose topically daily as needed      lidocaine (LIDODERM) 5 % Place 1 patch onto the skin      naloxone (NARCAN) 4 MG/0.1ML LIQD nasal spray 1 spray by Nasal route      Compression Sleeve MISC USE AS DIRECTED  0     No current facility-administered medications on file prior to visit. I have personally reviewed the ROS, PMH, PFH, and social history     Review of Systems   Constitutional: Negative for chills and fever. HENT: Negative for congestion. Eyes: Negative for pain. Respiratory: Negative for shortness of breath. Cardiovascular: Negative for chest pain. Gastrointestinal: Negative for abdominal pain. Genitourinary: Negative for hematuria. Musculoskeletal: Negative for back pain. Allergic/Immunologic: Negative for immunocompromised state. Neurological: Negative for headaches. Psychiatric/Behavioral: Negative for hallucinations. Objective:   LMP 10/08/2016 (Exact Date)     Physical Exam      Assessment:       Diagnosis Orders   1. Anxiety           Plan:    Telephone visit done because of coronavirus pandemic. Time spent 11 minutes   explained billeable visit, patient understands and agrees to continue  I was at home and patient was at home during this visit. Anxiety, see te to dr Viktor Schulz and below. Will discuss with PCP   Will discuss long term plan with pcp   Please fu with your pcp       No orders of the defined types were placed in this encounter. No orders of the defined types were placed in this encounter. In Summary, patient no longer under the auspices of [facility] care  Will run out of lorazepam around 11/23/2021  She is on methadone and adderall.   I do not feel comfortable rxing lorazepam long term for patient long term  In the future she may want to wean off but will discuss with her pcp  She may have enough lorazepam (she sometimes take it qd) to get through her scans as detailed below.   Will reach out to dr Marie Sumner and patient should get from dr Marie Sumner, psychiatry, or other  I believe patient was erronously put on my schedule as she plans to follow up with dr Marie Sumner 11/29/2021  If needed as last resort will rx for a few days to get her thorugh the tests on 11/29/2021  they can be addicting, higher risk of falls, overdose, death, etc  She understands dangerous drug combination and higher risk of overdose etc  Had hyserectomy   No si.hi   REFUSED FLU VACCINE  No se from lorazepam  It does help her anxiety   Refused covid vaccine 2021   If she cannot get into dr Marie Sumner before and or if needs to be tapered  If anything should change or worsen call ASAP, don't wait for next scheduled appointment. Return in about 4 weeks (around 12/9/2021) for worsening symptoms, call ASAP for appointment, Chronic condition management/appointment.       Lilly Garcia MD

## 2021-11-12 ENCOUNTER — TELEPHONE (OUTPATIENT)
Dept: FAMILY MEDICINE CLINIC | Age: 31
End: 2021-11-12

## 2021-11-12 NOTE — TELEPHONE ENCOUNTER
Talked to Gina Maldonado and tried to schedule a 4 Week follow up with cal and she declined. She will be staying with Dr Winsome Staples.

## 2021-11-14 NOTE — TELEPHONE ENCOUNTER
I have no problem doing that but do you know if it is due to decreased renal function or cancer within the kidneys?  I just have to know for the referral?

## 2021-11-15 NOTE — TELEPHONE ENCOUNTER
Spoke to pt reg Kidney function and referral. States that we can wait, will watch labs. Pt did have another question. States that Dr Michael Cortez had referred her to [place]. She saw Brenda. Seems to be a huge misunderstanding. Dr Keegan Muller sent msg to Dr Michael Cortez to discuss ot. Can you look into this for us?   [phone]

## 2021-11-15 NOTE — TELEPHONE ENCOUNTER
I just looked over your most recent kidney values and they honestly look pretty good. If your oncologist wanted us to send you to nephrology that's fine but otherwise you honestly probably don't need to go at this point. ..

## 2021-11-29 DIAGNOSIS — F41.9 ANXIETY: Primary | ICD-10-CM

## 2021-11-29 RX ORDER — LORAZEPAM 1 MG/1
.5-1 TABLET ORAL 2 TIMES DAILY PRN
Qty: 60 TABLET | Refills: 0 | Status: SHIPPED | OUTPATIENT
Start: 2021-11-29 | End: 2021-12-29

## 2021-11-29 NOTE — TELEPHONE ENCOUNTER
In summary   Patient was scheduled with me  I think by accident  She will ativan  She is other controls per other providers  She needs at least some ativan for her tests on 11/29/2021  Is this something you will rx for her long term  Do you need to see her first?  I told patient I would give her a few for testing only if needed

## 2022-01-03 ENCOUNTER — APPOINTMENT (OUTPATIENT)
Dept: CT IMAGING | Age: 32
End: 2022-01-03
Payer: COMMERCIAL

## 2022-01-03 ENCOUNTER — HOSPITAL ENCOUNTER (EMERGENCY)
Age: 32
Discharge: HOME | End: 2022-01-03
Attending: EMERGENCY MEDICINE
Payer: COMMERCIAL

## 2022-01-03 VITALS
OXYGEN SATURATION: 100 % | DIASTOLIC BLOOD PRESSURE: 77 MMHG | SYSTOLIC BLOOD PRESSURE: 125 MMHG | BODY MASS INDEX: 40.75 KG/M2 | HEART RATE: 78 BPM | WEIGHT: 230 LBS | HEIGHT: 63 IN | RESPIRATION RATE: 16 BRPM | TEMPERATURE: 98.7 F

## 2022-01-03 DIAGNOSIS — R10.9 LEFT FLANK PAIN: Primary | ICD-10-CM

## 2022-01-03 DIAGNOSIS — R16.1 SPLENOMEGALY: ICD-10-CM

## 2022-01-03 LAB
ALBUMIN SERPL-MCNC: 4.1 G/DL (ref 3.5–4.6)
ALP BLD-CCNC: 66 U/L (ref 40–130)
ALT SERPL-CCNC: 22 U/L (ref 0–33)
AMYLASE: 44 U/L (ref 22–93)
ANION GAP SERPL CALCULATED.3IONS-SCNC: 13 MEQ/L (ref 9–15)
AST SERPL-CCNC: 24 U/L (ref 0–35)
BASOPHILS ABSOLUTE: 0 K/UL (ref 0–0.1)
BASOPHILS RELATIVE PERCENT: 0.2 % (ref 0.1–1.2)
BILIRUB SERPL-MCNC: 0.3 MG/DL (ref 0.2–0.7)
BILIRUBIN URINE: NEGATIVE
BLOOD, URINE: NEGATIVE
BUN BLDV-MCNC: 8 MG/DL (ref 6–20)
CALCIUM SERPL-MCNC: 9.1 MG/DL (ref 8.5–9.9)
CHLORIDE BLD-SCNC: 102 MEQ/L (ref 95–107)
CLARITY: CLEAR
CO2: 24 MEQ/L (ref 20–31)
COLOR: YELLOW
CREAT SERPL-MCNC: 0.44 MG/DL (ref 0.5–0.9)
EOSINOPHILS ABSOLUTE: 0.1 K/UL (ref 0–0.4)
EOSINOPHILS RELATIVE PERCENT: 2.2 % (ref 0.7–5.8)
GFR AFRICAN AMERICAN: >60
GFR NON-AFRICAN AMERICAN: >60
GLOBULIN: 3.4 G/DL (ref 2.3–3.5)
GLUCOSE BLD-MCNC: 84 MG/DL (ref 70–99)
GLUCOSE URINE: NEGATIVE MG/DL
HCT VFR BLD CALC: 38 % (ref 37–47)
HEMOGLOBIN: 12.7 G/DL (ref 11.2–15.7)
IMMATURE GRANULOCYTES #: 0 K/UL
IMMATURE GRANULOCYTES %: 0.2 %
KETONES, URINE: NEGATIVE MG/DL
LEUKOCYTE ESTERASE, URINE: NEGATIVE
LIPASE: 34 U/L (ref 12–95)
LYMPHOCYTES ABSOLUTE: 0.9 K/UL (ref 1.2–3.7)
LYMPHOCYTES RELATIVE PERCENT: 18.7 %
MCH RBC QN AUTO: 28.7 PG (ref 25.6–32.2)
MCHC RBC AUTO-ENTMCNC: 33.4 % (ref 32.2–35.5)
MCV RBC AUTO: 85.8 FL (ref 79.4–94.8)
MONOCYTES ABSOLUTE: 0.4 K/UL (ref 0.2–0.9)
MONOCYTES RELATIVE PERCENT: 8.6 % (ref 4.7–12.5)
NEUTROPHILS ABSOLUTE: 3.5 K/UL (ref 1.6–6.1)
NEUTROPHILS RELATIVE PERCENT: 70.1 % (ref 34–71.1)
NITRITE, URINE: NEGATIVE
PDW BLD-RTO: 14.1 % (ref 11.7–14.4)
PH UA: 5 (ref 5–9)
PLATELET # BLD: 103 K/UL (ref 182–369)
PLATELET SLIDE REVIEW: ABNORMAL
POTASSIUM SERPL-SCNC: 3.8 MEQ/L (ref 3.4–4.9)
PROTEIN UA: NEGATIVE MG/DL
RBC # BLD: 4.43 M/UL (ref 3.93–5.22)
SODIUM BLD-SCNC: 139 MEQ/L (ref 135–144)
SPECIFIC GRAVITY UA: 1.02 (ref 1–1.03)
TOTAL PROTEIN: 7.5 G/DL (ref 6.3–8)
URINE REFLEX TO CULTURE: NORMAL
UROBILINOGEN, URINE: 0.2 E.U./DL
WBC # BLD: 5 K/UL (ref 4–10)

## 2022-01-03 PROCEDURE — 96374 THER/PROPH/DIAG INJ IV PUSH: CPT

## 2022-01-03 PROCEDURE — 36415 COLL VENOUS BLD VENIPUNCTURE: CPT

## 2022-01-03 PROCEDURE — 74176 CT ABD & PELVIS W/O CONTRAST: CPT

## 2022-01-03 PROCEDURE — 85025 COMPLETE CBC W/AUTO DIFF WBC: CPT

## 2022-01-03 PROCEDURE — 80053 COMPREHEN METABOLIC PANEL: CPT

## 2022-01-03 PROCEDURE — 96375 TX/PRO/DX INJ NEW DRUG ADDON: CPT

## 2022-01-03 PROCEDURE — 82150 ASSAY OF AMYLASE: CPT

## 2022-01-03 PROCEDURE — 6360000002 HC RX W HCPCS: Performed by: EMERGENCY MEDICINE

## 2022-01-03 PROCEDURE — 2580000003 HC RX 258: Performed by: EMERGENCY MEDICINE

## 2022-01-03 PROCEDURE — 81003 URINALYSIS AUTO W/O SCOPE: CPT

## 2022-01-03 PROCEDURE — 99283 EMERGENCY DEPT VISIT LOW MDM: CPT

## 2022-01-03 PROCEDURE — 83690 ASSAY OF LIPASE: CPT

## 2022-01-03 RX ORDER — SODIUM CHLORIDE 9 MG/ML
INJECTION, SOLUTION INTRAVENOUS CONTINUOUS
Status: DISCONTINUED | OUTPATIENT
Start: 2022-01-03 | End: 2022-01-03 | Stop reason: HOSPADM

## 2022-01-03 RX ORDER — SODIUM CHLORIDE 0.9 % (FLUSH) 0.9 %
3 SYRINGE (ML) INJECTION EVERY 8 HOURS
Status: DISCONTINUED | OUTPATIENT
Start: 2022-01-03 | End: 2022-01-03 | Stop reason: HOSPADM

## 2022-01-03 RX ORDER — FENTANYL CITRATE 50 UG/ML
100 INJECTION, SOLUTION INTRAMUSCULAR; INTRAVENOUS ONCE
Status: COMPLETED | OUTPATIENT
Start: 2022-01-03 | End: 2022-01-03

## 2022-01-03 RX ORDER — ONDANSETRON 2 MG/ML
4 INJECTION INTRAMUSCULAR; INTRAVENOUS ONCE
Status: COMPLETED | OUTPATIENT
Start: 2022-01-03 | End: 2022-01-03

## 2022-01-03 RX ORDER — TRAMADOL HYDROCHLORIDE 50 MG/1
50 TABLET ORAL EVERY 8 HOURS PRN
Qty: 15 TABLET | Refills: 0 | Status: SHIPPED | OUTPATIENT
Start: 2022-01-03 | End: 2022-01-08

## 2022-01-03 RX ADMIN — FENTANYL CITRATE 100 MCG: 50 INJECTION INTRAMUSCULAR; INTRAVENOUS at 12:00

## 2022-01-03 RX ADMIN — SODIUM CHLORIDE: 9 INJECTION, SOLUTION INTRAVENOUS at 12:00

## 2022-01-03 RX ADMIN — ONDANSETRON 4 MG: 2 INJECTION INTRAMUSCULAR; INTRAVENOUS at 12:00

## 2022-01-03 ASSESSMENT — PAIN DESCRIPTION - DESCRIPTORS: DESCRIPTORS: SHARP;CONSTANT

## 2022-01-03 ASSESSMENT — PAIN DESCRIPTION - ORIENTATION: ORIENTATION: LEFT

## 2022-01-03 ASSESSMENT — PAIN DESCRIPTION - ONSET: ONSET: ON-GOING

## 2022-01-03 ASSESSMENT — ENCOUNTER SYMPTOMS
SINUS PRESSURE: 0
CONSTIPATION: 0
COUGH: 0
CHOKING: 0
BACK PAIN: 0
TROUBLE SWALLOWING: 0
DIARRHEA: 0
VOICE CHANGE: 0
VOMITING: 0
STRIDOR: 0
EYE REDNESS: 0
SORE THROAT: 0
EYE PAIN: 0
WHEEZING: 0
BLOOD IN STOOL: 0
SHORTNESS OF BREATH: 0
CHEST TIGHTNESS: 0
ABDOMINAL PAIN: 1
FACIAL SWELLING: 0
EYE DISCHARGE: 0

## 2022-01-03 ASSESSMENT — PAIN DESCRIPTION - LOCATION: LOCATION: ABDOMEN;RIB CAGE

## 2022-01-03 ASSESSMENT — PAIN DESCRIPTION - FREQUENCY: FREQUENCY: CONTINUOUS

## 2022-01-03 ASSESSMENT — PAIN SCALES - GENERAL
PAINLEVEL_OUTOF10: 7
PAINLEVEL_OUTOF10: 7

## 2022-01-03 ASSESSMENT — PAIN DESCRIPTION - PAIN TYPE: TYPE: ACUTE PAIN

## 2022-01-03 NOTE — ED PROVIDER NOTES
[facility] ED  eMERGENCY dEPARTMENT eNCOUnter      Pt Name: Nneka Billingsley  MRN: [id number]  [place] 1990  Date of evaluation: 1/3/2022  Provider: Leydi Schwartz MD    CHIEF COMPLAINT       Chief Complaint   Patient presents with    Other     left upper abd pain/rib pain. pt states she has an enlarged spleen from chemo treatments. x6 days         HISTORY OF PRESENT ILLNESS   (Location/Symptom, Timing/Onset,Context/Setting, Quality, Duration, Modifying Factors, Severity)  Note limiting factors. Nneka Billingsley is a 27 y.o. female who presents to the emergency department after this emergency because the left flank and left upper abdominal discomfort going on for the past few days time getting worse patient has multiple medical issues including history of breast cancer irritable bowel syndrome anxiety depression chronic back pain chronic chest wall pain remote history of perforation of the intestine as per patient ADHD chronic pain management cirrhosis of liver hematemesis and nausea associated with liver disease patient undergone surgery for her breast cancer abdominal exploratory surgery status post appendectomy and right mastectomy along with hysterectomy patient is ex-smoker and marijuana smoker with alcohol abuse patient status post cholecystectomy as well    HPI    NursingNotes were reviewed. REVIEW OF SYSTEMS    (2-9 systems for level 4, 10 or more for level 5)     Review of Systems   Constitutional: Positive for activity change, appetite change, chills, diaphoresis, fatigue and fever. HENT: Negative for congestion, drooling, facial swelling, mouth sores, nosebleeds, sinus pressure, sore throat, trouble swallowing and voice change. Eyes: Negative for pain, discharge, redness and visual disturbance. Respiratory: Negative for cough, choking, chest tightness, shortness of breath, wheezing and stridor. Cardiovascular: Negative for chest pain, palpitations and leg swelling. Gastrointestinal: Positive for abdominal pain. Negative for blood in stool, constipation, diarrhea and vomiting. Endocrine: Negative for cold intolerance, polyphagia and polyuria. Genitourinary: Negative for dysuria, flank pain, frequency, genital sores and urgency. Musculoskeletal: Negative for back pain, joint swelling, neck pain and neck stiffness. Skin: Negative for pallor and rash. Neurological: Negative for tremors, seizures, syncope, weakness, numbness and headaches. Hematological: Negative for adenopathy. Does not bruise/bleed easily. Psychiatric/Behavioral: Negative for agitation, behavioral problems, hallucinations and sleep disturbance. The patient is not hyperactive. All other systems reviewed and are negative. Except as noted above the remainder of the review of systems was reviewed and negative.        PAST MEDICAL HISTORY     Past Medical History:   Diagnosis Date    Bursitis     Cancer ([facility] [address] 09/2016    IDC Right breast BRCA1/2 neg, ERPR <1% Her 2+ Ki67 70%  multifocal G2 with multifocal G3 DCIS 4.3 cm 8/8 LN + medial margin    Chest wall pain, chronic 7/13/2017    Chronic kidney disease     Cirrhosis of liver ([address] 5/20/2019    Degenerative disorder of bone     Degenerative joint disease of low back     Depression with anxiety 7/13/2017    Depression with anxiety     Endometriosis 7/13/2017    Estrogen receptor negative tumor status 3/28/2017    Overview:  HER-2 potitive    IBS (irritable bowel syndrome)     Marijuana use 7/13/2017    Neuropathy     Osteoarthritis     Osteoporosis     PCOS (polycystic ovarian syndrome) 7/13/2017    Portal hypertension ([address]     Prolonged emergence from general anesthesia 08/2016    trouble staying awake post-op    Restless legs syndrome     Scoliosis     Tendonitis of shoulder     Torn rotator cuff     Wound of right breast 11/1/2017         SURGICALHISTORY       Past Surgical History:   Procedure Laterality Date    ABDOMINAL EXPLORATION SURGERY  07/18/2017    repair of perforated cecum, incidental appedectomy    APPENDECTOMY  07/18/2017    incidental - done during ex-lap with repair of perforated cecum    BREAST BIOPSY Bilateral 1/16/2020    BILATERAL BREAST MASS EXCISIONAL BIOPSY performed by Rebeka Queen MD at [facility] [address] Bilateral 01/16/2020    Excisional Breast Mass Biopsy Bilat    BREAST LUMPECTOMY Bilateral 01/16/2020    bilateral excisional biopsies (DR NAIDA POLANCO)    BREAST SURGERY Right 09/22/2016    Lumpectomy and SLN    CHOLECYSTECTOMY N/A 2004    HYSTERECTOMY VAGINAL Bilateral 10/17/2016    LAVH  BILATERAL  SALPINGECTOMY performed by Stephanie Quigley DO at [address] [place] [address] HYSTEROSCOPY  05/16/2016    DR Maria R Eric    HYSTEROSCOPY  08/22/2016    FRACTIONAL D&C-OPERATIVE LAPAROSCOPY    INSERTION / REMOVAL / REPLACEMENT VENOUS ACCESS CATHETER N/A 10/27/2016    PLACEMENT VENOUS ACCESS DEVICE , PAT DONE 10-23 performed by Leroy Evans MD at [facility] 7/17/2017    MASTECTOMY Right 10/17/2016    INJECTION METHYLENE BLUE RE-EXCISION RIGHT BREAST CANCER SITE, RIGHT SENTINEL NODE BIOPSY performed by Leroy Evans MD at [facility] Right 04/04/2017    OTHER SURGICAL HISTORY Right 08/15/2017    EXCISION OF CHEST WALL MASS (DR POLANCO @ [facility])         CURRENT MEDICATIONS       Previous Medications    AMPHETAMINE-DEXTROAMPHETAMINE (ADDERALL XR) 30 MG EXTENDED RELEASE CAPSULE    TAKE 1 CAPSULE BY MOUTH EVERY DAY    AMPHETAMINE-DEXTROAMPHETAMINE (ADDERALL) 20 MG TABLET    take ONE-HALF to 1 (ONE) TABLET BY MOUTH once daily    COMPRESSION SLEEVE MISC    USE AS DIRECTED    DICLOFENAC SODIUM (VOLTAREN) 1 % GEL    Apply 2 grams to affected area 4 times a day as needed    LIDOCAINE (LIDODERM) 5 %    Place 1 patch onto the skin    LIDOCAINE-PRILOCAINE (EMLA) 2.5-2.5 % CREAM    Apply 1 Dose topically daily as needed    METHADONE (DOLOPHINE) 5 MG TABLET    Take 5 mg by mouth daily as needed for Pain. NALOXONE (NARCAN) 4 MG/0.1ML LIQD NASAL SPRAY    1 spray by Nasal route    PREGABALIN (LYRICA) 100 MG CAPSULE    Take 100 mg by mouth 3 times daily as needed (pain).     TIZANIDINE (ZANAFLEX) 4 MG TABLET    TAKE 1 TABLET BY MOUTH THREE TIMES DAILY AS DIRECTED       ALLERGIES     Octreotide, Cherry, Lansoprazole, and Zantac [ranitidine hcl]    FAMILY HISTORY       Family History   Problem Relation Age of Onset    Heart Attack Father 50   Briceno Arthritis Mother     COPD Mother     Emphysema Mother     Cancer Mother         esophagus    Diabetes Mother     Heart Disease Mother     Breast Cancer Sister     Cervical Cancer Other     Breast Cancer Paternal Aunt     Endometrial Cancer Paternal Aunt     Stroke Maternal Grandmother     No Known Problems Maternal Grandfather     No Known Problems Paternal Grandmother     No Known Problems Paternal Grandfather     No Known Problems Sister     No Known Problems Sister     No Known Problems Sister     No Known Problems Sister     No Known Problems Daughter           SOCIAL HISTORY       Social History     Socioeconomic History    Marital status:      Spouse name: Sruthi Isabel Number of children: None    Years of education: None    Highest education level: None   Occupational History    Occupation: nurses aide   Tobacco Use    Smoking status: Former Smoker     Packs/day: 1.00     Years: 4.00     Pack years: 4.00     Types: Cigarettes     Start date:      Quit date: 2010     Years since quittin.3    Smokeless tobacco: Never Used   Vaping Use    Vaping Use: Never used   Substance and Sexual Activity    Alcohol use: Yes     Comment: occassional    Drug use: Yes     Types: Marijuana (Weed)     Comment: daily marijuana use    Sexual activity: Yes     Partners: Male   Other Topics Concern    None   Social History Narrative    Lives with  and daughter        2 cats    1 dog    1 hamster    10 chickens Likes to vandana     Social Determinants of Health     Financial Resource Strain: Low Risk     Difficulty of Paying Living Expenses: Not hard at all   Food Insecurity: No Food Insecurity    Worried About Running Out of Food in the Last Year: Never true    Kelli of Food in the Last Year: Never true   Transportation Needs:     Lack of Transportation (Medical): Not on file    Lack of Transportation (Non-Medical): Not on file   Physical Activity:     Days of Exercise per Week: Not on file    Minutes of Exercise per Session: Not on file   Stress:     Feeling of Stress : Not on file   Social Connections:     Frequency of Communication with Friends and Family: Not on file    Frequency of Social Gatherings with Friends and Family: Not on file    Attends [employer] Services: Not on file    Active Member of [address] [place] [address] [employer] or Organizations: Not on file    Attends Club or Organization Meetings: Not on file    Marital Status: Not on file   Intimate Partner Violence:     Fear of Current or Ex-Partner: Not on file    Emotionally Abused: Not on file    Physically Abused: Not on file    Sexually Abused: Not on file   Housing Stability:     Unable to Pay for Housing in the Last Year: Not on file    Number of [place] in the Last Year: Not on file    Unstable Housing in the Last Year: Not on file       SCREENINGS      @FLOW([id number])@      PHYSICAL EXAM    (up to 7 for level 4, 8 or more for level 5)     ED Triage Vitals [01/03/22 1109]   BP Temp Temp Source Pulse Resp SpO2 Height Weight   (!) 149/110 99.2 °F (37.3 °C) Temporal 87 16 99 % 5' 3\" (1.6 m) 230 lb (104.3 kg)       Physical Exam  Vitals reviewed. Constitutional:       Appearance: She is well-developed and normal weight. HENT:      Head: Normocephalic. Right Ear: Tympanic membrane, ear canal and external ear normal. There is no impacted cerumen. Left Ear: Tympanic membrane, ear canal and external ear normal. There is impacted cerumen. Nose: No rhinorrhea. Mouth/Throat:      Pharynx: Oropharyngeal exudate present. Eyes:      General:         Left eye: No discharge. Extraocular Movements: Extraocular movements intact. Neck:      Vascular: No carotid bruit. Cardiovascular:      Rate and Rhythm: Normal rate. Heart sounds: Normal heart sounds. No murmur heard. No friction rub. No gallop. Pulmonary:      Effort: No respiratory distress. Breath sounds: Normal breath sounds. No stridor. No wheezing, rhonchi or rales. Chest:      Chest wall: No tenderness. Abdominal:      General: Bowel sounds are normal.      Palpations: Abdomen is soft. There is no mass. Tenderness: There is abdominal tenderness. There is no rebound. Musculoskeletal:         General: No tenderness. Normal range of motion. Cervical back: Neck supple. No rigidity or tenderness. Lymphadenopathy:      Cervical: No cervical adenopathy. Skin:     General: Skin is warm. Findings: No erythema or rash. Neurological:      General: No focal deficit present. Mental Status: She is alert and oriented to person, place, and time. Cranial Nerves: No cranial nerve deficit. Motor: No abnormal muscle tone. Psychiatric:         Behavior: Behavior normal.         Thought Content: Thought content normal.         DIAGNOSTIC RESULTS     EKG: All EKG's are interpreted by the Emergency Department Physician who either signs or Co-signsthis chart in the absence of a cardiologist.        RADIOLOGY:   Raul Blotter such as CT, Ultrasound and MRI are read by the radiologist. Plain radiographic images are visualized and preliminarily interpreted by the emergency physician with the below findings:        Interpretation per the Radiologist below, if available at the time ofthis note:    CT ABDOMEN PELVIS WO CONTRAST Additional Contrast? None   Final Result      1. Splenomegaly   2.  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 3. There is no evidence for bowel obstruction or diverticulitis          All CT scans at this facility use dose modulation, iterative reconstruction, and/or weight based dosing when appropriate to reduce radiation dose to as low as reasonably achievable. ED BEDSIDE ULTRASOUND:   Performed by ED Physician - none    LABS:  Labs Reviewed   COMPREHENSIVE METABOLIC PANEL - Abnormal; Notable for the following components:       Result Value    CREATININE 0.44 (*)     All other components within normal limits   CBC WITH AUTO DIFFERENTIAL - Abnormal; Notable for the following components:    Platelets 563 (*)     Lymphocytes Absolute 0.9 (*)     All other components within normal limits   URINE RT REFLEX TO CULTURE   LIPASE   AMYLASE       All other labs were within normal range or not returned as of this dictation.     EMERGENCY DEPARTMENT COURSE and DIFFERENTIAL DIAGNOSIS/MDM:   Vitals:    Vitals:    01/03/22 1109 01/03/22 1145 01/03/22 1215   BP: (!) 149/110 125/77    Pulse: 87 84 83   Resp: 16     Temp: 99.2 °F (37.3 °C) 98.7 °F (37.1 °C)    TempSrc: Temporal     SpO2: 99% 98% 97%   Weight: 230 lb (104.3 kg)     Height: 5' 3\" (1.6 m)             MDM  Number of Diagnoses or Management Options  Diagnosis management comments: Multiple medical issues patient denies any alcohol abuse social drinking during during [place] and your time patient has no jaundice tenderness to the left flank area no UTI finding consistent with splenomegaly and confirmed with a CAT scan no obstruction no blockage no perforation patient resting comfortably this time advised continue present medication follow-up with her doctors patient is a very       Amount and/or Complexity of Data Reviewed  Clinical lab tests: ordered and reviewed  Tests in the radiology section of CPT®: ordered and reviewed      CRITICAL CARE TIME   Total Critical Care time was  minutes, excluding separately

## 2022-01-04 ENCOUNTER — VIRTUAL VISIT (OUTPATIENT)
Dept: FAMILY MEDICINE CLINIC | Age: 32
End: 2022-01-04
Payer: COMMERCIAL

## 2022-01-04 DIAGNOSIS — R16.1 SPLENOMEGALY: ICD-10-CM

## 2022-01-04 DIAGNOSIS — R68.81 EARLY SATIETY: ICD-10-CM

## 2022-01-04 DIAGNOSIS — R10.13 EPIGASTRIC ABDOMINAL PAIN: Primary | ICD-10-CM

## 2022-01-04 DIAGNOSIS — F41.9 ANXIETY: ICD-10-CM

## 2022-01-04 DIAGNOSIS — F90.2 ATTENTION DEFICIT HYPERACTIVITY DISORDER (ADHD), COMBINED TYPE: ICD-10-CM

## 2022-01-04 PROCEDURE — 99214 OFFICE O/P EST MOD 30 MIN: CPT | Performed by: STUDENT IN AN ORGANIZED HEALTH CARE EDUCATION/TRAINING PROGRAM

## 2022-01-04 RX ORDER — DEXTROAMPHETAMINE SACCHARATE, AMPHETAMINE ASPARTATE, DEXTROAMPHETAMINE SULFATE AND AMPHETAMINE SULFATE 5; 5; 5; 5 MG/1; MG/1; MG/1; MG/1
TABLET ORAL
Qty: 30 TABLET | Refills: 0 | Status: SHIPPED | OUTPATIENT
Start: 2022-01-04 | End: 2022-08-01

## 2022-01-04 RX ORDER — DEXTROAMPHETAMINE SACCHARATE, AMPHETAMINE ASPARTATE MONOHYDRATE, DEXTROAMPHETAMINE SULFATE AND AMPHETAMINE SULFATE 7.5; 7.5; 7.5; 7.5 MG/1; MG/1; MG/1; MG/1
CAPSULE, EXTENDED RELEASE ORAL
Qty: 30 CAPSULE | Refills: 0 | Status: CANCELLED | OUTPATIENT
Start: 2022-01-04 | End: 2022-03-14

## 2022-01-04 RX ORDER — LORAZEPAM 1 MG/1
1 TABLET ORAL EVERY 6 HOURS PRN
Qty: 30 TABLET | Refills: 0 | Status: SHIPPED | OUTPATIENT
Start: 2022-01-04 | End: 2022-02-03

## 2022-01-04 RX ORDER — LORAZEPAM 1 MG/1
1 TABLET ORAL EVERY 6 HOURS PRN
COMMUNITY
End: 2022-01-04 | Stop reason: SDUPTHER

## 2022-01-04 ASSESSMENT — ENCOUNTER SYMPTOMS
DIARRHEA: 0
COUGH: 0
ABDOMINAL PAIN: 0
VOMITING: 0
NAUSEA: 0
SINUS PRESSURE: 0
SORE THROAT: 0
SHORTNESS OF BREATH: 0
CONSTIPATION: 0

## 2022-01-04 ASSESSMENT — PATIENT HEALTH QUESTIONNAIRE - PHQ9
SUM OF ALL RESPONSES TO PHQ QUESTIONS 1-9: 0
SUM OF ALL RESPONSES TO PHQ9 QUESTIONS 1 & 2: 0
SUM OF ALL RESPONSES TO PHQ QUESTIONS 1-9: 0
10. IF YOU CHECKED OFF ANY PROBLEMS, HOW DIFFICULT HAVE THESE PROBLEMS MADE IT FOR YOU TO DO YOUR WORK, TAKE CARE OF THINGS AT HOME, OR GET ALONG WITH OTHER PEOPLE: 0
SUM OF ALL RESPONSES TO PHQ QUESTIONS 1-9: 0
2. FEELING DOWN, DEPRESSED OR HOPELESS: 0
7. TROUBLE CONCENTRATING ON THINGS, SUCH AS READING THE NEWSPAPER OR WATCHING TELEVISION: 0
8. MOVING OR SPEAKING SO SLOWLY THAT OTHER PEOPLE COULD HAVE NOTICED. OR THE OPPOSITE, BEING SO FIGETY OR RESTLESS THAT YOU HAVE BEEN MOVING AROUND A LOT MORE THAN USUAL: 0
5. POOR APPETITE OR OVEREATING: 0
3. TROUBLE FALLING OR STAYING ASLEEP: 0
6. FEELING BAD ABOUT YOURSELF - OR THAT YOU ARE A FAILURE OR HAVE LET YOURSELF OR YOUR FAMILY DOWN: 0
1. LITTLE INTEREST OR PLEASURE IN DOING THINGS: 0
9. THOUGHTS THAT YOU WOULD BE BETTER OFF DEAD, OR OF HURTING YOURSELF: 0
4. FEELING TIRED OR HAVING LITTLE ENERGY: 0
SUM OF ALL RESPONSES TO PHQ QUESTIONS 1-9: 0

## 2022-01-04 NOTE — PROGRESS NOTES
2022    TELEHEALTH EVALUATION -- Audio/Visual (During GZVLJ-21 public health emergency)    Due to COVID 19 outbreak, patient's office visit was converted to a virtual visit. Patient was contacted and agreed to proceed with a virtual visit via [email or web address]  The risks and benefits of converting to a virtual visit were discussed in light of the current infectious disease epidemic. Patient also understood that insurance coverage and co-pays are up to their individual insurance plans. HPI:  Winnie Lewis (:  1990) has requested an audio/video evaluation for the following concern(s):  Chief Complaint   Patient presents with    3 Month Follow-Up    Follow-Up from [address] [facility].  Medication Refill     Ativan, and adderall. Abdominal pain  Symptoms started  - started after she bent over, had severe pain and nausea, heard \"popping noise\"  Hx enlarged spleen (dx three years ago)  ER 1/3 - labs, CT abdominal/pelvis and UA normal  Pain in the epigastric area  Amylase and lipase non elevated in the ER  Eating doesn't make it better or worse  Worse when she sits up, movement makes it worse    Anxiety  Taking ativan once daily  Starting to wean off of adderall   Was referred to psychiatry but somehow got scheduled with Dr. Kiki Jeffrey      Review of Systems   Constitutional: Negative for chills and fever. HENT: Negative for congestion, sinus pressure and sore throat. Respiratory: Negative for cough and shortness of breath. Cardiovascular: Negative for chest pain and palpitations. Gastrointestinal: Negative for abdominal pain, constipation, diarrhea, nausea and vomiting. Musculoskeletal: Negative for arthralgias and myalgias. Skin: Negative for rash and wound. Neurological: Negative for speech difficulty and light-headedness. Psychiatric/Behavioral: Negative for suicidal ideas. The patient is not nervous/anxious. Prior to Visit Medications    Medication Sig Taking? Authorizing Provider   amphetamine-dextroamphetamine (ADDERALL) 20 MG tablet take ONE-HALF to 1 (ONE) TABLET BY MOUTH once daily Yes Kalyani Ran, DO   LORazepam (ATIVAN) 1 MG tablet Take 1 tablet by mouth every 6 hours as needed for Anxiety for up to 30 days. Yes Kalyani Ran, DO   traMADol (ULTRAM) 50 MG tablet Take 1 tablet by mouth every 8 hours as needed for Pain for up to 5 days. Yes Kaci Eldridge MD   methadone (DOLOPHINE) 5 MG tablet Take 5 mg by mouth daily as needed for Pain. Yes Historical Provider, MD   pregabalin (LYRICA) 100 MG capsule Take 100 mg by mouth 3 times daily as needed (pain).  Yes Historical Provider, MD   tiZANidine (ZANAFLEX) 4 MG tablet TAKE 1 TABLET BY MOUTH THREE TIMES DAILY AS DIRECTED Yes Historical Provider, MD   diclofenac sodium (VOLTAREN) 1 % GEL Apply 2 grams to affected area 4 times a day as needed Yes Historical Provider, MD   lidocaine-prilocaine (EMLA) 2.5-2.5 % cream Apply 1 Dose topically daily as needed Yes Historical Provider, MD   lidocaine (LIDODERM) 5 % Place 1 patch onto the skin Yes Historical Provider, MD   naloxone (NARCAN) 4 MG/0.1ML LIQD nasal spray 1 spray by Nasal route Yes Historical Provider, MD   Compression Sleeve MISC USE AS DIRECTED Yes Historical Provider, MD       Social History     Tobacco Use    Smoking status: Former Smoker     Packs/day: 1.00     Years: 4.00     Pack years: 4.00     Types: Cigarettes     Start date:      Quit date: 2010     Years since quittin.3    Smokeless tobacco: Never Used   Vaping Use    Vaping Use: Never used   Substance Use Topics    Alcohol use: Yes     Comment: occassional    Drug use: Yes     Types: Marijuana (Weed)     Comment: daily marijuana use        Allergies   Allergen Reactions    Octreotide Nausea And Vomiting     Extreme Vomiting    Cherry Hives    Lansoprazole Other (See Comments)     seizures or convulsions    Zantac [Ranitidine Hcl] Other (See Comments)     seizure   ,   Past Medical History:   Diagnosis Date    Bursitis     Cancer ([facility] 09/2016    IDC Right breast BRCA1/2 neg, ERPR <1% Her 2+ Ki67 70%  multifocal G2 with multifocal G3 DCIS 4.3 cm 8/8 LN + medial margin    Chest wall pain, chronic 7/13/2017    Chronic kidney disease     Cirrhosis of liver ([facility] 5/20/2019    Degenerative disorder of bone     Degenerative joint disease of low back     Depression with anxiety 7/13/2017    Depression with anxiety     Endometriosis 7/13/2017    Estrogen receptor negative tumor status 3/28/2017    Overview:  HER-2 potitive    IBS (irritable bowel syndrome)     Marijuana use 7/13/2017    Neuropathy     Osteoarthritis     Osteoporosis     PCOS (polycystic ovarian syndrome) 7/13/2017    Portal hypertension ([facility]     Prolonged emergence from general anesthesia 08/2016    trouble staying awake post-op    Restless legs syndrome     Scoliosis     Tendonitis of shoulder     Torn rotator cuff     Wound of right breast 11/1/2017   ,   Past Surgical History:   Procedure Laterality Date    ABDOMINAL EXPLORATION SURGERY  07/18/2017    repair of perforated cecum, incidental appedectomy    APPENDECTOMY  07/18/2017    incidental - done during ex-lap with repair of perforated cecum    BREAST BIOPSY Bilateral 1/16/2020    BILATERAL BREAST MASS EXCISIONAL BIOPSY performed by Ace Mccauley MD at [facility] Bilateral 01/16/2020    Excisional Breast Mass Biopsy Bilat    BREAST LUMPECTOMY Bilateral 01/16/2020    bilateral excisional biopsies (DR NAIDA POLANCO)    BREAST SURGERY Right 09/22/2016    Lumpectomy and SLN    CHOLECYSTECTOMY N/A 2004    HYSTERECTOMY VAGINAL Bilateral 10/17/2016    LAVH  BILATERAL  SALPINGECTOMY performed by Edgardo Taylor DO at [facility] HYSTEROSCOPY  05/16/2016    DR WILLARD    HYSTEROSCOPY  08/22/2016    FRACTIONAL D&C-OPERATIVE LAPAROSCOPY    INSERTION / REMOVAL / REPLACEMENT VENOUS ACCESS CATHETER N/A 10/27/2016    PLACEMENT VENOUS ACCESS DEVICE , PAT DONE 10-23 performed by Veronica Gomez MD at [address] 2017    MASTECTOMY Right 10/17/2016    INJECTION METHYLENE BLUE RE-EXCISION RIGHT BREAST CANCER SITE, RIGHT SENTINEL NODE BIOPSY performed by Veronica Gomez MD at [address] [facility], MODIFIED RADICAL Right 2017    OTHER SURGICAL HISTORY Right 08/15/2017    EXCISION OF CHEST WALL MASS (DR POLANCO @ [facility])   ,   Social History     Tobacco Use    Smoking status: Former Smoker     Packs/day: 1.00     Years: 4.00     Pack years: 4.00     Types: Cigarettes     Start date:      Quit date: 2010     Years since quittin.3    Smokeless tobacco: Never Used   Vaping Use    Vaping Use: Never used   Substance Use Topics    Alcohol use: Yes     Comment: occassional    Drug use: Yes     Types: Marijuana (Weed)     Comment: daily marijuana use   ,   Family History   Problem Relation Age of Onset    Heart Attack Father 50    Arthritis Mother     COPD Mother     Emphysema Mother     Cancer Mother         esophagus    Diabetes Mother     Heart Disease Mother     Breast Cancer Sister     Cervical Cancer Other     Breast Cancer Paternal Aunt     Endometrial Cancer Paternal Aunt     Stroke Maternal Grandmother     No Known Problems Maternal Grandfather     No Known Problems Paternal Grandmother     No Known Problems Paternal Grandfather     No Known Problems Sister     No Known Problems Sister     No Known Problems Sister     No Known Problems Sister     No Known Problems Daughter    ,   Immunization History   Administered Date(s) Administered    Hepatitis A 2019    Hepatitis B 2019   ,   Health Maintenance   Topic Date Due    Varicella vaccine (1 of 2 - 2-dose childhood series) Never done    Pneumococcal 0-64 years Vaccine (1 of 4 - PCV13) Never done    Depression Monitoring  Never done    COVID-19 Vaccine (1) Never done    DTaP/Tdap/Td vaccine (1 - Tdap) Never done    Hepatitis B vaccine (3 of 3 - Risk 3-dose series) 03/06/2020    Hepatitis A vaccine (3 of 3 - Hep A Twinrix risk 3-dose series) 04/06/2020    Flu vaccine (1) Never done    Hib vaccine  Aged Out    Meningococcal (ACWY) vaccine  Aged Out       PHYSICAL EXAMINATION:  [ INSTRUCTIONS:  \"[x]\" Indicates a positive item  \"[]\" Indicates a negative item  -- DELETE ALL ITEMS NOT EXAMINED]  [] Alert  [] Oriented to person/place/time    [] No apparent distress  [] Toxic appearing    [] Face flushed appearing [] Sclera clear  [] Lips are cyanotic      [] Breathing appears normal  [] Appears tachypneic      [] Rash on visible skin    [] Cranial Nerves II-XII grossly intact    [] Motor grossly intact in visible upper extremities    [] Motor grossly intact in visible lower extremities    [] Normal Mood  [] Anxious appearing    [] Depressed appearing  [] Confused appearing      [] Poor short term memory  [] Poor long term memory    [] OTHER:      Due to this being a TeleHealth encounter, evaluation of the following organ systems is limited: Vitals/Constitutional/EENT/Resp/CV/GI//MS/Neuro/Skin/Heme-Lymph-Imm. ASSESSMENT/PLAN:  1. Epigastric abdominal pain  Continuing to have epigastric abdominal pain  In the ER yesterday CT abdomen pelvis, lab work and urine analysis were normal  Patient has a history of splenomegaly was diagnosed several years ago, she was told this is due to portal hypertension from her liver  Endorses early satiety, pain is worse when she sits up  Would recommend she follow-up with her GI doctor at this time due to worsening symptoms    2. Early satiety      3. Splenomegaly    4. Anxiety  We will refill Ativan for now, patient is titrating off the Adderall  Patient was for geropsychiatry before however somehow ended up seeing Dr. Charly Avila referral created    - LORazepam (ATIVAN) 1 MG tablet; Take 1 tablet by mouth every 6 hours as needed for Anxiety for up to 30 days. Dispense: 30 tablet;  Refill: 0  - [facility] Vu MD, [place]    5. Attention deficit hyperactivity disorder (ADHD), combined type    - amphetamine-dextroamphetamine (ADDERALL) 20 MG tablet; take ONE-HALF to 1 (ONE) TABLET BY MOUTH once daily  Dispense: 30 tablet; Refill: 0      Return in about 20 days (around 1/24/2022) for as scheduled. An  electronic signature was used to authenticate this note. --Oscar Mack DO on 1/4/2022 at 2:29 PM        Pursuant to the emergency declaration under the [facility], [facility] waiver authority and the [employer] and [employer] Act, this Virtual  Visit was conducted, with patient's consent, to reduce the patient's risk of exposure to COVID-19 and provide continuity of care for an established patient. Services were provided through a video synchronous discussion virtually to substitute for in-person clinic visit.

## 2022-04-01 ENCOUNTER — HOSPITAL ENCOUNTER (OUTPATIENT)
Dept: PHYSICAL THERAPY | Age: 32
Setting detail: THERAPIES SERIES
Discharge: HOME | End: 2022-04-01
Payer: COMMERCIAL

## 2022-04-01 PROCEDURE — 97162 PT EVAL MOD COMPLEX 30 MIN: CPT

## 2022-04-01 PROCEDURE — 97110 THERAPEUTIC EXERCISES: CPT

## 2022-04-01 ASSESSMENT — PAIN DESCRIPTION - ORIENTATION: ORIENTATION: RIGHT;LEFT

## 2022-04-01 ASSESSMENT — PAIN DESCRIPTION - ONSET: ONSET: AWAKENED FROM SLEEP

## 2022-04-01 ASSESSMENT — PAIN DESCRIPTION - FREQUENCY: FREQUENCY: CONTINUOUS

## 2022-04-01 ASSESSMENT — PAIN DESCRIPTION - LOCATION: LOCATION: SHOULDER;BACK

## 2022-04-01 ASSESSMENT — PAIN SCALES - GENERAL: PAINLEVEL_OUTOF10: 7

## 2022-04-01 ASSESSMENT — PAIN DESCRIPTION - PAIN TYPE: TYPE: CHRONIC PAIN

## 2022-04-01 ASSESSMENT — PAIN DESCRIPTION - DESCRIPTORS: DESCRIPTORS: ACHING;SHARP

## 2022-04-01 NOTE — PROGRESS NOTES
0-70: able to take to her L1-2 area but with pain  L Shoulder Ext Rotation  0-90: 0-37 degrees with elbow at her side  Spine  Lumbar: Lumbar flexion=25%  extension=25%  SB R and L =50%   Rot R and L = 50%  Special Tests: 90-90= R=38 degrees   L= 40       SLR=Neg left and right; Positive hip griding on L LE    Strength RLE  Strength RLE: WNL  Strength LLE  Strength LLE: WNL  Strength RUE  R Shoulder Flexion: 4-/5  R Shoulder Extension: 4-/5  R Shoulder ABduction: 4-/5;4/5  R Shoulder Internal Rotation: 4-/5;4/5  R Shoulder External Rotation: 4-/5;4/5  R Elbow Flexion: 4/5  R Elbow Extension: 4/5  Strength LUE  L Shoulder Flexion: 3+/5;4-/5  L Shoulder ABduction: 4-/5  L Shoulder Internal Rotation: 4-/5  L Shoulder External Rotation: 4-/5  L Elbow Flexion: 4-/5;4/5  L Elbow Extension: 4-/5  Strength Other  Other: core grossly 4-/5 to 4/5                            Exercises  Exercise 1: *Lower Trunk Rotation x 3 H10 sec  Exercise 2: *Seated HS stretch x 3 H30  Exercise 3: *Seated Low back stretch x3 H                      Assessment   Conditions Requiring Skilled Therapeutic Intervention  Body structures, Functions, Activity limitations: Decreased functional mobility ; Increased pain;Decreased ROM; Decreased strength;Decreased endurance  Assessment: Pt is a 26 yo female who has a 6 year hx of breast cancer treatment after both a Stage 2 to Stage 4 breast cancer with mets. Pt is now in spontaneous remission and had her chemo treatment in March 2021. No cancer noted anymore in her lung, pelvis or skin (the mets). Pt now here due to the residual affects of her radiation and chemo treatments. PT completed evaluation with pt having increased L UE pain with movement and left hip pain with movement. PT then instructed pt in supine and seated ther ex and gave pt a copy of HEP with plans to instruct in stretching and strengthening as pt able to tolerate to improve her mobility and endurance and decrease her pain.   Treatment Diagnosis: Upper and low back pain; Bilateral shoulder pian and reconditioning  Prognosis: Good  Decision Making: Medium Complexity  REQUIRES PT FOLLOW UP: Yes  Activity Tolerance  Activity Tolerance: Patient limited by fatigue;Patient limited by pain         Plan   Plan  Times per week: 1-2  Plan weeks: 4-6  Current Treatment Recommendations: Strengthening,Neuromuscular Re-education,Home Exercise Program,ROM,Manual Therapy - Soft Tissue Mobilization,Endurance Training,Patient/Caregiver Education & Training,Functional Mobility Training,Manual Therapy - Joint Manipulation,Gait Training,Modalities,Pain Management  Plan Comment: *NO Estim or US due to last chemo March 2021; Okay for KT tape, CP/HP         Goals  Short term goals  Time Frame for Short term goals: 1-2 weeks from date of eval  Short term goal 1: Pt reports able to complete her HEP 3-5x per week  Short term goal 2: Pt reports back pain as 4/10 or less, right shoulder 4/10 and left shoulder 5/10 or less with ADLs  Long term goals  Time Frame for Long term goals : 4-6 weeks  Long term goal 1: Pt reports decreased back pain as 3/10 or less and shoulder pain as 3/10 or less with ADLs and with household chores  Long term goal 2: Pt demonstrated AROM for B shoulder abduction= 120 degrees or >; Increase left shoulder flexion to 130 degrees or >; Able to perform trunk flexion and extension WFL with less reports of pain  Long term goal 3: Increased B LE strength to 4/5 to 4/5 so pt can reach overhead into her cupboard without pain; Increase B LE and core strength to 4+/5 or > so pt can be on her feet for longer periods of time without pain  Long term goal 4: Improves pts SPADI score from 79% Disability to <30% or better to improve pts UE mobility  Long term goal 5: Pt indep with an advance HEP for her B UEs, upper/low back and legs. Patient Goals   Patient goals :  To decrease her shoulder and back pain to improve her mobility       Therapy Time   Individual Concurrent Group Co-treatment   Time In  12:00pm         Time Out  1:10pm         Minutes  70 min                  KRISTI Melara#[id number]

## 2022-04-05 ENCOUNTER — HOSPITAL ENCOUNTER (OUTPATIENT)
Dept: PHYSICAL THERAPY | Age: 32
Setting detail: THERAPIES SERIES
Discharge: HOME | End: 2022-04-05
Payer: COMMERCIAL

## 2022-04-05 NOTE — PROGRESS NOTES
Physical Therapy  Daily Treatment Note  Date: 2022  Patient Name: Loli Nj  MRN: [id number]     :   1990    Treatment Diagnosis: Upper and low back pain; Bilateral shoulder pian and reconditioning    Subjective:   General  Additional Pertinent Hx: 2016 Dx with right breast cancer Stage 2 and then progressed to Stage 4 with Mets to lung, left pelvis and skin; Pt has chemo and radition and has gone in to spontaneous remission so last chemo in 2021 with no more cancer noted in pts lung, pelvis or skin. Right mastectomy 3/31/2017. Pt also has a left RTC tear. Now here for strengthening. Family / Caregiver Present: No  Referring Practitioner: Eugene Godfrey  PT Visit Information  Onset Date:  (3 years)  Total # of Visits Approved: 12  Total # of Visits to Date: 1  Plan of Care/Certification Expiration Date: 22  No Show: 0  Canceled Appointment: 1  Subjective  Subjective: Pt. cancelled. Sick child. Treatment Activities:                                                                          Assessment:   Conditions Requiring Skilled Therapeutic Intervention  Body structures, Functions, Activity limitations: Decreased functional mobility ; Increased pain;Decreased ROM; Decreased strength;Decreased endurance  Treatment Diagnosis: Upper and low back pain; Bilateral shoulder pian and reconditioning  Prognosis: Good  REQUIRES PT FOLLOW UP: Yes        Goals:  Short term goals  Time Frame for Short term goals: 1-2 weeks from date of eval  Short term goal 1: Pt reports able to complete her HEP 3-5x per week  Short term goal 2: Pt reports back pain as 4/10 or less, right shoulder 4/10 and left shoulder 5/10 or less with ADLs  Long term goals  Time Frame for Long term goals : 4-6 weeks  Long term goal 1: Pt reports decreased back pain as 3/10 or less and shoulder pain as 3/10 or less with ADLs and with household chores  Long term goal 2: Pt demonstrated AROM for B shoulder abduction= 120 degrees or >; Increase left shoulder flexion to 130 degrees or >; Able to perform trunk flexion and extension WFL with less reports of pain  Long term goal 3: Increased B LE strength to 4/5 to 4/5 so pt can reach overhead into her cupboard without pain; Increase B LE and core strength to 4+/5 or > so pt can be on her feet for longer periods of time without pain  Long term goal 4: Improves pts SPADI score from 79% Disability to <30% or better to improve pts UE mobility  Long term goal 5: Pt indep with an advance HEP for her B UEs, upper/low back and legs. Patient Goals   Patient goals :  To decrease her shoulder and back pain to improve her mobility    Plan:          Plan  Times per week: 1-2  Plan weeks: 4-6  Current Treatment Recommendations: Strengthening,Neuromuscular Re-education,Home Exercise Program,ROM,Manual Therapy - Soft Tissue Mobilization,Endurance Training,Patient/Caregiver Education & Training,Functional Mobility Training,Manual Therapy - Joint Manipulation,Gait Training,Modalities,Pain Management  Plan Comment: *NO Estim or US due to last chemo March 2021; Okay for KT tape, CP/HP    Therapy Time   Individual Concurrent Group Co-treatment   Time In           Time Out           Minutes  0                 Sunshine Hidalgo PTA  License and [address] Number: [id number]

## 2022-04-12 ENCOUNTER — HOSPITAL ENCOUNTER (OUTPATIENT)
Dept: PHYSICAL THERAPY | Age: 32
Setting detail: THERAPIES SERIES
Discharge: HOME | End: 2022-04-12
Payer: COMMERCIAL

## 2022-04-12 PROCEDURE — 97110 THERAPEUTIC EXERCISES: CPT

## 2022-04-12 ASSESSMENT — PAIN DESCRIPTION - FREQUENCY: FREQUENCY: CONTINUOUS

## 2022-04-12 ASSESSMENT — PAIN DESCRIPTION - ORIENTATION: ORIENTATION: LEFT

## 2022-04-12 ASSESSMENT — PAIN DESCRIPTION - LOCATION: LOCATION: SHOULDER;BACK

## 2022-04-12 ASSESSMENT — PAIN DESCRIPTION - PAIN TYPE: TYPE: CHRONIC PAIN

## 2022-04-12 ASSESSMENT — PAIN DESCRIPTION - DESCRIPTORS: DESCRIPTORS: ACHING;SHARP

## 2022-04-12 NOTE — PROGRESS NOTES
Physical Therapy  Daily Treatment Note  Date: 2022  Patient Name: Alhaji Damico  MRN: [id number]     :   1990    Treatment Diagnosis: Upper and low back pain; Bilateral shoulder pian and reconditioning    Subjective:   General  Additional Pertinent Hx: 2016 Dx with right breast cancer Stage 2 and then progressed to Stage 4 with Mets to lung, left pelvis and skin; Pt has chemo and radition and has gone in to spontaneous remission so last chemo in 2021 with no more cancer noted in pts lung, pelvis or skin. Right mastectomy 3/31/2017. Pt also has a left RTC tear. Now here for strengthening. Family / Caregiver Present: No  Referring Practitioner: Kalee Browne  PT Visit Information  Onset Date:  (3 years)  Total # of Visits Approved: 12  Total # of Visits to Date: 2  Plan of Care/Certification Expiration Date: 22  No Show: 1  Canceled Appointment: 1  Subjective  Subjective: Pt. states L shd pain 6/10 and L hip pain 5/10. Pain Screening  Patient Currently in Pain: Yes  Pain Assessment  Pain Assessment: 0-10  Pain Level:  (6/10 L shd and 5/10 L hip )  Pain Type: Chronic pain  Pain Location: Shoulder;Back  Pain Orientation: Left  Pain Descriptors: Aching; Sharp  Pain Frequency: Continuous  Vital Signs  Patient Currently in Pain: Yes       Treatment Activities:                                      Exercises  Exercise 1: *Lower Trunk Rotation x 3 H30 sec  Exercise 2: *Seated HS stretch x 3 H30  Exercise 3: *Seated Low back stretch x3 H 20 sec   Exercise 4: Wall slides BUE flexion 5 hold with eccentric abduction x 10 hold   Exercise 5: Doorway stretch mid level 30 sec x 3   Exercise 6: Piriformis stretch 30 sec x 2   Exercise 7: Scap retractions x 10 hold 3 sec      Modalities  Cryotherapy (Minutes\Location): Pt. to ice at home                              Assessment:   Conditions Requiring Skilled Therapeutic Intervention  Body structures, Functions, Activity limitations: Decreased functional mobility ;Increased pain;Decreased ROM; Decreased strength;Decreased endurance  Assessment: Reviewed current HEP with pt. and added various mobility MEREDITH and stretches to improve. Educated frequent change in position and ergonomic set up at computer. Pt. tolerated session with pain level 6/10 overall. Pt. to ice at home. Treatment Diagnosis: Upper and low back pain; Bilateral shoulder pian and reconditioning  Prognosis: Good  REQUIRES PT FOLLOW UP: Yes        Goals:  Short term goals  Time Frame for Short term goals: 1-2 weeks from date of eval  Short term goal 1: Pt reports able to complete her HEP 3-5x per week  Short term goal 2: Pt reports back pain as 4/10 or less, right shoulder 4/10 and left shoulder 5/10 or less with ADLs  Long term goals  Time Frame for Long term goals : 4-6 weeks  Long term goal 1: Pt reports decreased back pain as 3/10 or less and shoulder pain as 3/10 or less with ADLs and with household chores  Long term goal 2: Pt demonstrated AROM for B shoulder abduction= 120 degrees or >; Increase left shoulder flexion to 130 degrees or >; Able to perform trunk flexion and extension WFL with less reports of pain  Long term goal 3: Increased B LE strength to 4/5 to 4/5 so pt can reach overhead into her cupboard without pain; Increase B LE and core strength to 4+/5 or > so pt can be on her feet for longer periods of time without pain  Long term goal 4: Improves pts SPADI score from 79% Disability to <30% or better to improve pts UE mobility  Long term goal 5: Pt indep with an advance HEP for her B UEs, upper/low back and legs. Patient Goals   Patient goals :  To decrease her shoulder and back pain to improve her mobility    Plan:           Plan  Times per week: 1-2  Plan weeks: 4-6  Current Treatment Recommendations: Strengthening,Neuromuscular Re-education,Home Exercise Program,ROM,Manual Therapy - Soft Tissue Mobilization,Endurance Training,Patient/Caregiver Education & Training,Functional Mobility Training,Manual Therapy - Joint Manipulation,Gait Training,Modalities,Pain Management  Plan Comment: *NO Estim or US due to last chemo March 2021; Okay for KT tape, CP/HP    Therapy Time   Individual Concurrent Group Co-treatment   Time In  1100         Time Out  [address] , PTA  License and [address] Number: [id number]

## 2022-04-14 ENCOUNTER — HOSPITAL ENCOUNTER (OUTPATIENT)
Dept: PHYSICAL THERAPY | Age: 32
Setting detail: THERAPIES SERIES
Discharge: HOME | End: 2022-04-14
Payer: COMMERCIAL

## 2022-04-14 PROCEDURE — 97140 MANUAL THERAPY 1/> REGIONS: CPT

## 2022-04-14 PROCEDURE — 97110 THERAPEUTIC EXERCISES: CPT

## 2022-04-14 ASSESSMENT — PAIN DESCRIPTION - LOCATION: LOCATION: SHOULDER;BACK

## 2022-04-14 ASSESSMENT — PAIN DESCRIPTION - ORIENTATION: ORIENTATION: LEFT

## 2022-04-14 ASSESSMENT — PAIN DESCRIPTION - PAIN TYPE: TYPE: CHRONIC PAIN

## 2022-04-14 ASSESSMENT — PAIN SCALES - GENERAL: PAINLEVEL_OUTOF10: 7

## 2022-04-14 NOTE — PROGRESS NOTES
Physical Therapy  Daily Treatment Note  Date: 2022  Patient Name: Rafi Luke  MRN: [id number]     :   1990    Subjective:   General  Additional Pertinent Hx: 2016 Dx with right breast cancer Stage 2 and then progressed to Stage 4 with Mets to lung, left pelvis and skin; Pt has chemo and radition and has gone in to spontaneous remission so last chemo in 2021 with no more cancer noted in pts lung, pelvis or skin. Right mastectomy 3/31/2017. Pt also has a left RTC tear. Now here for strengthening. Family / Caregiver Present: No  Referring Practitioner: Gerhardt Gower  PT Visit Information  Onset Date:  (3 years )  Total # of Visits Approved: 12  Total # of Visits to Date: 3  Plan of Care/Certification Expiration Date: 22  No Show: 1  Canceled Appointment: 1  Subjective  Subjective: Pt states yesterday she felt very stiff until 2:00 P. Bethanie Javed states she walked for 3 hours tuesday. She states she didn't do stretches until last night. \"I find on my good days, I tend to overdo it\".    Pain Screening  Patient Currently in Pain: Yes  Pain Assessment  Pain Assessment: 0-10  Pain Level: 7 (7/10 shoulder, 5/10 back )  Pain Type: Chronic pain  Pain Location: Shoulder;Back  Pain Orientation: Left  Vital Signs  Patient Currently in Pain: Yes       Treatment Activities:                                      Exercises  Exercise 5: Doorway stretch high level 20 sec x 3   Exercise 7: Scap retractions x 10 hold 3-5 sec  (tactile cues throughout)  Exercise 8: upper trap stretches; H15-20'' x 3   Exercise 9: capital D stretch; x 10   Exercise 10: PB bounce; (orange) x 1 min between stretches  Exercise 11: PB [place] stretches CW/CCW; x 10 ea (small within tolerance)   Exercise 12: PB lateral shifts; x 10 H5''   Exercise 13: PB march; H3'' x 10   Exercise 14: PB LAQ; H3'' x 10   Exercise 16: PB shoulder extension; H3'' x 10 YTB                                    Assessment:   Conditions Requiring Skilled Therapeutic Intervention  Body structures, Functions, Activity limitations: Decreased functional mobility ; Increased pain;Decreased ROM; Decreased strength;Decreased endurance  Assessment: Pt tolerating gentle core strengthening while seated on PB. VC' and tactile cues throughout tx for proper form. Pt reports no inc in pain during tx. Issued shoulder extension for HEP with focus on abodominal bracing. Donned KT tape to support shoulder and dec pain. Pain level generalized 8/10 post. Educated on icing at home to prevent muscle fatigue and dec pain. Continue with POC. Treatment Diagnosis: Upper and low back pain; Bilateral shoulder pian and reconditioning  REQUIRES PT FOLLOW UP: Yes      G-Code:     OutComes Score                                                     Goals:  Short term goals  Time Frame for Short term goals: 1-2 weeks from date of eval  Short term goal 1: Pt reports able to complete her HEP 3-5x per week  Short term goal 2: Pt reports back pain as 4/10 or less, right shoulder 4/10 and left shoulder 5/10 or less with ADLs  Long term goals  Time Frame for Long term goals : 4-6 weeks  Long term goal 1: Pt reports decreased back pain as 3/10 or less and shoulder pain as 3/10 or less with ADLs and with household chores  Long term goal 2: Pt demonstrated AROM for B shoulder abduction= 120 degrees or >; Increase left shoulder flexion to 130 degrees or >; Able to perform trunk flexion and extension WFL with less reports of pain  Long term goal 3: Increased B LE strength to 4/5 to 4/5 so pt can reach overhead into her cupboard without pain; Increase B LE and core strength to 4+/5 or > so pt can be on her feet for longer periods of time without pain  Long term goal 4: Improves pts SPADI score from 79% Disability to <30% or better to improve pts UE mobility  Long term goal 5: Pt indep with an advance HEP for her B UEs, upper/low back and legs. Patient Goals   Patient goals :  To decrease her shoulder and back pain to improve her mobility    Plan:          Plan  Times per week: 1-2  Plan weeks: 4-6  Current Treatment Recommendations: Strengthening,Neuromuscular Re-education,Home Exercise Program,ROM,Manual Therapy - Soft Tissue Mobilization,Endurance Training,Patient/Caregiver Education & Training,Functional Mobility Training,Manual Therapy - Joint Manipulation,Gait Training,Modalities,Pain Management  Plan Comment: *NO Estim or US due to last chemo March 2021; Okay for KT tape, CP/HP        Therapy Time   Individual Concurrent Group Co-treatment   Time In  1100         Time Out  [address], [facility] [place]  License and [address] Number: [id number]

## 2022-04-21 ENCOUNTER — HOSPITAL ENCOUNTER (OUTPATIENT)
Dept: PHYSICAL THERAPY | Age: 32
Setting detail: THERAPIES SERIES
Discharge: HOME | End: 2022-04-21
Payer: COMMERCIAL

## 2022-04-21 NOTE — PROGRESS NOTES
Physical Therapy  Daily Treatment Note  Date: 2022  Patient Name: Rafi Luke  MRN: [id number]     :   1990    Treatment Diagnosis: Upper and low back pain; Bilateral shoulder pian and reconditioning  Subjective:   General  Additional Pertinent Hx: 2016 Dx with right breast cancer Stage 2 and then progressed to Stage 4 with Mets to lung, left pelvis and skin; Pt has chemo and radition and has gone in to spontaneous remission so last chemo in 2021 with no more cancer noted in pts lung, pelvis or skin. Right mastectomy 3/31/2017. Pt also has a left RTC tear. Now here for strengthening. Family / Caregiver Present: No  Referring Practitioner: Gerhardt Gower  PT Visit Information  Onset Date:  (3 years )  Total # of Visits Approved: 12  Total # of Visits to Date: 4  Plan of Care/Certification Expiration Date: 22  No Show: 2  Canceled Appointment: 2  Subjective  Subjective: Pt. cancelled ill. Treatment Activities:                                                                          Assessment:   Conditions Requiring Skilled Therapeutic Intervention  Body Structures, Functions, Activity Limitations Requiring Skilled Therapeutic Intervention: Decreased functional mobility ; Increased pain;Decreased ROM; Decreased strength;Decreased endurance  Treatment Diagnosis: Upper and low back pain; Bilateral shoulder pian and reconditioning        Goals:  Short Term Goals  Time Frame for Short term goals: 1-2 weeks from date of eval  Short term goal 1: Pt reports able to complete her HEP 3-5x per week  Short term goal 2: Pt reports back pain as 4/10 or less, right shoulder 4/10 and left shoulder 5/10 or less with ADLs  Long Term Goals  Time Frame for Long term goals : 4-6 weeks  Long term goal 1: Pt reports decreased back pain as 3/10 or less and shoulder pain as 3/10 or less with ADLs and with household chores  Long term goal 2: Pt demonstrated AROM for B shoulder abduction= 120 degrees or >; Increase left shoulder flexion to 130 degrees or >; Able to perform trunk flexion and extension WFL with less reports of pain  Long term goal 3: Increased B LE strength to 4/5 to 4/5 so pt can reach overhead into her cupboard without pain; Increase B LE and core strength to 4+/5 or > so pt can be on her feet for longer periods of time without pain  Long term goal 4: Improves pts SPADI score from 79% Disability to <30% or better to improve pts UE mobility  Long term goal 5: Pt indep with an advance HEP for her B UEs, upper/low back and legs.     Plan:    Plan  Plan weeks: 4-6  Plan Comment: *NO Estim or US due to last chemo March 2021; Okay for KT tape, CP/HP        Therapy Time   Individual Concurrent Group Co-treatment   Time In           Time Out           Minutes  0                 Saul Royal PTA  License and [address] Number: [id number]

## 2022-05-03 ENCOUNTER — APPOINTMENT (OUTPATIENT)
Dept: PHYSICAL THERAPY | Age: 32
End: 2022-05-03
Payer: COMMERCIAL

## 2022-05-05 ENCOUNTER — APPOINTMENT (OUTPATIENT)
Dept: PHYSICAL THERAPY | Age: 32
End: 2022-05-05
Payer: COMMERCIAL

## 2022-05-25 ENCOUNTER — HOSPITAL ENCOUNTER (OUTPATIENT)
Dept: PHYSICAL THERAPY | Age: 32
Setting detail: THERAPIES SERIES
Discharge: HOME | End: 2022-05-25
Payer: COMMERCIAL

## 2022-05-25 PROCEDURE — 97110 THERAPEUTIC EXERCISES: CPT

## 2022-05-25 PROCEDURE — 97140 MANUAL THERAPY 1/> REGIONS: CPT

## 2022-05-25 ASSESSMENT — PAIN SCALES - GENERAL: PAINLEVEL_OUTOF10: 0

## 2022-05-25 ASSESSMENT — PAIN DESCRIPTION - PAIN TYPE: TYPE: CHRONIC PAIN

## 2022-05-25 NOTE — PROGRESS NOTES
Physical Therapy: Daily Note   Patient: Evert Marie (3 y.o. female)   Examination Date:   Plan of Care/Certification Expiration Date: 22    No data recorded   :  1990 # of Visits since [facility]:   4   MRN: [id number]  CSN: [id number] Start of Care Date:   2022   Insurance: Payor: Celsa Rosendale / Plan: Celsa Ros[place] / Product Type: *No Product type* /   Insurance ID: [id number] - (Commercial) Secondary Insurance (if applicable):    Referring Physician: Daniel Ovalle MD     PCP: Grace West,  Visits to Date/Visits Approved:     No Show/Cancelled Appts:      Medical Diagnosis: Low back pain [M54.50]  Upper back pain [M54.9]  Shoulder pain [M25.519]    Treatment Diagnosis: Upper and low back pain; Bilateral shoulder pian and reconditioning        SUBJECTIVE EXAMINATION   Pain Level: Pain Screening  Patient Currently in Pain: No  Pain Level: 0  Pain Type: Chronic pain    Patient Comments: Subjective: Pt. returning to therapy post covid x 1 month. Reports no pain currently. Pain mainly in B shd if overdoing it. Pt. states she purchased membership at [employer] yesterday hoping to go in pool. Pt. states she has not used yet. HEP Compliance: Fair        OBJECTIVE EXAMINATION   Restrictions:  No data recorded No data recorded No data recorded              TREATMENT     Exercises:      Treatment Reasoning    Exercise 1: Standing March BLE x 10 hold 5 sec  Exercise 2: Standing Hip ABD x 10 hold 5 sec  Exercise 3: Standing hip ext x 10 hold 5 sec  Exercise 4: seated PB scap retractions x 10hold 5 sec  Exercise 5: seated PB mid rows GTB x 10 hold 5 sec  Exercise 6: seated PB B shd ext x 10 hold 5 sec  Exercise 7: seated PB abd bracing x 10 hold 5 sec  Exercise 8: seated PB abd bracing with march x 10 hold 5 sec  Exercise 9: seated PB I, Y, T x 5 ea hold 3 sec VC not to hike.                            Manual Therapy: (CPT 92657) Treatment Reasoning     Soft Tissue Mobilizaton: MFR R Rib area and sternum 1-5 to dec tightness with education on how to perform                         Pt Education:         ASSESSMENT     Assessment: Assessment: Pt. seen for treatment this date following one month no therapy due to pt. having Covid 19. Pt. is scheduled for recheck next session. Pt. now arriving to therapy with no pain hoping to go to [employer] to do exercises in the pool. Instructed in standing hip therex which can be perfromed on land or in pool. Tolerates no inc in pain. Body Structures, Functions, Activity Limitations Requiring Skilled Therapeutic Intervention: Decreased functional mobility ,Increased pain,Decreased ROM,Decreased strength,Decreased endurance    Post-Treatment Pain Level: Activity Tolerance: Patient limited by fatigue; Patient limited by pain    Therapy Prognosis: Good       GOALS   Patient goals : To decrease her shoulder and back pain to improve her mobility  Short Term Goals Completed by 1-2 weeks from date of eval Current Status Goal Status   Pt reports able to complete her HEP 3-5x per week   Met   Pt reports back pain as 4/10 or less, right shoulder 4/10 and left shoulder 5/10 or less with ADLs   Partially met                             Long Term Goals Completed by 4-6 weeks Current Status Goal Status   Pt reports decreased back pain as 3/10 or less and shoulder pain as 3/10 or less with ADLs and with household chores   Partially met   Pt demonstrated AROM for B shoulder abduction= 120 degrees or >; Increase left shoulder flexion to 130 degrees or >; Able to perform trunk flexion and extension WFL with less reports of pain       Increased B LE strength to 4/5 to 4/5 so pt can reach overhead into her cupboard without pain;  Increase B LE and core strength to 4+/5 or > so pt can be on her feet for longer periods of time without pain       Improves pts SPADI score from 79% Disability to <30% or better to improve pts UE mobility       Pt indep with an advance HEP for her B UEs, upper/low back and legs.             TREATMENT PLAN               Therapy Time  Individual Time In:       4245  Individual Time Out:  130  Minutes:  45        Electronically signed by Moriah Velasquez PTA  on 9/17/0301 at 1:50 PM   [address] [facility]

## 2022-05-27 ENCOUNTER — HOSPITAL ENCOUNTER (OUTPATIENT)
Dept: PHYSICAL THERAPY | Age: 32
Setting detail: THERAPIES SERIES
Discharge: HOME | End: 2022-05-27
Payer: COMMERCIAL

## 2022-05-27 PROCEDURE — 97530 THERAPEUTIC ACTIVITIES: CPT

## 2022-05-27 NOTE — PROGRESS NOTES
Physical Therapy: Recheck Note   Patient: Christin Gilbert (89 y.o. female)   Examination Date:   Plan of Care/Certification Expiration Date: 22    No data recorded   :  1990 # of Visits since [facility]:   5   MRN: [id number]  CSN: [id number] Start of Care Date:   2022   Insurance: Payor: [employer] / Plan: Emmanuel Pagoda / Product Type: *No Product type* /   Insurance ID: [id number] - (Commercial) Secondary Insurance (if applicable):    Referring Physician: Verena Maldonado MD [address]   PCP: Elle Faria DO Visits to Date/Visits Approved:  (6 current plus 1-2x per week for additional 4-6 weeks=18)    No Show/Cancelled Appts:      Medical Diagnosis: Low back pain [M54.50]  Upper back pain [M54.9]  Shoulder pain [M25.519]    Treatment Diagnosis: Upper and low back pain; Bilateral shoulder pain and reconditioning        SUBJECTIVE EXAMINATION   Pain Level: Pain Screening  Patient Currently in Pain: Denies    Patient Comments: Subjective: Pt reports that she is feeling really good and no pain currently.     HEP Compliance: Good        OBJECTIVE EXAMINATION   Restrictions:  No data recorded No data recorded No data recorded      Right AROM  Right PROM         AROM RUE (degrees)  R Shoulder Flexion 0-180: 168 degrees  R Shoulder Extension 0-45: 0-40 degrees  R Shoulder ABduction 0-180: 0-152 in standing  R Shoulder Int Rotation  0-70: Able to take to her low back (below her left hand)  R Shoulder Ext Rotation 0-90: 0-55 ER with elbow at her side            Left AROM  Left PROM         AROM LUE (degrees)  L Shoulder Flexion 0-180: 0-170 in standing  L Shoulder Extension 0-45: 0-41 degrees  L Shoulder ABduction 0-180: 0-152 degrees in standing  L Shoulder Int Rotation  0-70: able to reach low back  L Shoulder Ext Rotation  0-90: 0-58 degrees with elbow at her side            Left AROM  Right AROM      AROM LUE (degrees)  L Shoulder Flexion 0-180: 0-170 in standing  L Shoulder Extension 0-45: 0-41 degrees  L Shoulder ABduction 0-180: 0-152 degrees in standing  L Shoulder Int Rotation  0-70: able to reach low back  L Shoulder Ext Rotation  0-90: 0-58 degrees with elbow at her side AROM RUE (degrees)  R Shoulder Flexion 0-180: 168 degrees  R Shoulder Extension 0-45: 0-40 degrees  R Shoulder ABduction 0-180: 0-152 in standing  R Shoulder Int Rotation  0-70: Able to take to her low back (below her left hand)  R Shoulder Ext Rotation 0-90: 0-55 ER with elbow at her side       Left PROM  Right PROM                Left Strength  Right Strength      Strength Other  Other: core grossly 4/5  Other: core grossly 4/5  Strength LUE  L Shoulder Flexion: 4/5,4+/5  L Shoulder ABduction: 4/5  L Shoulder Internal Rotation: 4/5  L Shoulder External Rotation: 4/5  L Elbow Flexion: 4/5,4+/5  L Elbow Extension: 4/5,4+/5  Strength LLE  L Hip Flexion: 4-/5,4/5  L Hip ABduction: 4/5  L Hip ADduction: 4/5  L Knee Flexion: 4/5  L Knee Extension: 4/5  L Ankle Dorsiflexion: 4/5  L Ankle Plantar Flexion: 4/5 Strength Other  Other: core grossly 4/5  Other: core grossly 4/5  Strength RUE  R Shoulder Flexion: 4/5  R Shoulder Extension: 4/5  R Shoulder ABduction: 4/5  R Shoulder Internal Rotation: 4/5,4+/5  R Shoulder External Rotation: 4/5,4+/5  R Elbow Flexion: 4/5  R Elbow Extension: 4/5  Strength RLE  R Hip Flexion: 4-/5,4/5  R Hip ABduction: 4/5  R Hip Internal Rotation: 4/5  R Knee Flexion: 4/5  R Knee Extension: 4/5  R Ankle Dorsiflexion: 4/5  R Ankle Plantar flexion: 4/5           TREATMENT     Pt Education: Plan Comment: *NO Estim or US due to last chemo March 2021; Okay for KT tape, CP/HP       ASSESSMENT     Assessment: Assessment: PT completed recheck today with plans to continue with PT for 1-2x per week for 4-6 weeks as pt did not have therapy for one month due to Covid. Pt has improved with her ROM and strength in all areas. Plan to continue with PT to build her strength, endurance and moblilty and continue to decrease her pain.   Body Structures, Functions, Activity Limitations Requiring Skilled Therapeutic Intervention: Decreased functional mobility ,Increased pain,Decreased ROM,Decreased strength,Decreased endurance    Post-Treatment Pain Level:  0/10    Activity Tolerance: Patient limited by fatigue; Patient limited by pain    Therapy Prognosis: Good       GOALS   Patient goals : To decrease her shoulder and back pain to improve her mobility  Short Term Goals Completed by 1-2 weeks from date of recheck Current Status Goal Status   Pt reports able to complete her HEP 3-5x per week Pt able to do 3x per week Met   Pt reports back pain as 4/10 or less, right shoulder 4/10 and left shoulder 5/10 or less with ADLs Pt reports that her pain goes up to a 5/10 at times but her pain medicine as changed to PRN vs daily In progress                             Long Term Goals Completed by 4-6 weeks Current Status Goal Status   Pt reports decreased back pain as 3/10 or less and shoulder pain as 3/10 or less with ADLs and with household chores Pt reports that her back and shoulder pain goes to 4/10 \"all over\" Partially met   Pt demonstrated AROM for B shoulder abduction= 120 degrees or >; Increase left shoulder flexion to 130 degrees or >; Able to perform trunk flexion and extension WFL with less reports of pain Pt has met her ROM goals for her UEs and reports less pain with movement of her trunk Met   Increased B LE strength to 4/5 to 4+/5 so pt can reach overhead into her cupboard without pain; Increase B LE and core strength to 4+/5 or > so pt can be on her feet for longer periods of time without pain Improving with her strength in all andra In progress   Improves pts SPADI score from 79% Disability to <30% or better to improve pts UE mobility SPADI at recheck=48% Not Met   Pt indep with an advance HEP for her B UEs, upper/low back and legs.  Progressing towards goal In progress        TREATMENT PLAN   Plan Frequency: 1-2x per week  Plan weeks: 4-6  Current Treatment Recommendations: Strengthening,ROM,Neuromuscular re-education,Manual Therapy - Soft Tissue Mobilization,Manual Therapy - Joint Manipulation,Endurance training,Home exercise program,Pain management   Requires PT Follow-Up: Yes  Plan Comment: *NO Estim or US due to last chemo March 2021; Okay for KT tape, CP/HP       Therapy Time  Individual Time In:     11:15am     Individual Time Out:  12:00pm  Minutes:    45 min      Electronically signed by Adonis Floyd, PT #[id number] on 5/27/2022 at 1:27 PM

## 2022-06-10 ENCOUNTER — HOSPITAL ENCOUNTER (OUTPATIENT)
Dept: PHYSICAL THERAPY | Age: 32
Setting detail: THERAPIES SERIES
Discharge: HOME | End: 2022-06-10
Payer: COMMERCIAL

## 2022-06-10 PROCEDURE — 97110 THERAPEUTIC EXERCISES: CPT

## 2022-06-10 NOTE — PROGRESS NOTES
Physical Therapy  Physical Therapy: Daily Note   Patient: Bello Stone (55 y.o. female)   Examination Date:   Plan of Care/Certification Expiration Date: 22    No data recorded   :  1990 # of Visits since [facility]:   6   MRN: [id number]  CSN: [id number] Start of Care Date:   2022   Insurance: Payor: Deane Frankel / Plan: Deane Frankel / Product Type: *No Product type* /   Insurance ID: [id number] - (Commercial) Secondary Insurance (if applicable):    Referring Physician: Abi Rubio MD     PCP: Kevin Rogers, DO Visits to Date/Visits Approved:     No Show/Cancelled Appts:      Medical Diagnosis: Low back pain [M54.50]  Upper back pain [M54.9]  Shoulder pain [M25.519] Upper and low back pain; Bilateral shoulder pain  Treatment Diagnosis: Upper and low back pain; Bilateral shoulder pain and reconditioning        SUBJECTIVE EXAMINATION   Pain Level:      Patient Comments: Subjective: I have not been able to do my HEP the last 2 weeks due to me being so busy. I still feel sore 4/10 right now in back and shoulders. By the end of the day my pain increases to 8/10. HEP Compliance: Good        OBJECTIVE EXAMINATION   Restrictions:  No data recorded No data recorded No data recorded              TREATMENT     Exercises:      Treatment Reasoning    Exercise 1: Standing March BLE x 10 hold 5 sec  Exercise 2: Standing Hip ABD x 10 hold 5 sec  Exercise 3: Standing hip ext x 10 hold 5 sec  Exercise 4: seated PB scap retractions x 10hold 5 sec  Exercise 5: seated PB mid rows GTB x 10 hold 5 sec  Exercise 6: seated PB B shd ext x 10 hold 5 sec  Exercise 7: seated PB abd bracing x 10 hold 5 sec  Exercise 8: seated PB abd bracing with march x 10 hold 5 sec  Exercise 9: seated PB I, Y, T x 10 ea hold 3 sec, R shoulder slightly higher than L LE  Exercise 10: forward wall B shoulder flexion sliding up wall with pillow case with elbows against wall x 15' , vc's for breathing technique.   Exercise 11: Back against wall with mini squat engaging core of back against wall with B shoulder flexion withwand x 15'. VC's for breathing technique.                          Pt Education: Pt. Education on engaging HEP       ASSESSMENT     Assessment: Assessment: Pt. arrived at appointment reporting she has not been able to engage doing her HEP since last visit 2 weeks ago. Pt. reports she finds out she is in more pain when she is not doing anything versus being active. Pt. able to tolerate completion of all ther ex and added ther ex against wall with little complaints of L shoulder tension during flexion and back tension with core wall ther ex. Pt. education on engaging with current ther ex and added HEP as tolerating to assist with increasing strength, endurance, and shoulder ROM as tolerated. Body Structures, Functions, Activity Limitations Requiring Skilled Therapeutic Intervention: Decreased functional mobility ,Increased pain,Decreased ROM,Decreased strength,Decreased endurance    Post-Treatment Pain Level: Activity Tolerance: Patient limited by fatigue; Patient limited by pain    Therapy Prognosis: Good       GOALS   Patient goals :  To decrease her shoulder and back pain to improve her mobility  Short Term Goals Completed by 1-2 weeks from date of recheck Current Status Goal Status   Pt reports able to complete her HEP 3-5x per week Pt able to do 3x per week Met   Pt reports back pain as 4/10 or less, right shoulder 4/10 and left shoulder 5/10 or less with ADLs Pt reports that her pain goes up to a 5/10 at times but her pain medicine as changed to PRN vs daily In progress                                                                       Long Term Goals Completed by 4-6 weeks Current Status Goal Status   Pt reports decreased back pain as 3/10 or less and shoulder pain as 3/10 or less with ADLs and with household chores Pt reports that her back and shoulder pain goes to 4/10 \"all over\" Partially met   Pt

## 2022-06-14 ENCOUNTER — HOSPITAL ENCOUNTER (OUTPATIENT)
Dept: PHYSICAL THERAPY | Age: 32
Setting detail: THERAPIES SERIES
Discharge: HOME | End: 2022-06-14
Payer: COMMERCIAL

## 2022-06-14 PROCEDURE — 97140 MANUAL THERAPY 1/> REGIONS: CPT

## 2022-06-14 PROCEDURE — 97110 THERAPEUTIC EXERCISES: CPT

## 2022-06-14 ASSESSMENT — PAIN DESCRIPTION - LOCATION: LOCATION: SHOULDER;NECK;CHEST

## 2022-06-14 ASSESSMENT — PAIN DESCRIPTION - ORIENTATION: ORIENTATION: LEFT;RIGHT

## 2022-06-14 ASSESSMENT — PAIN DESCRIPTION - FREQUENCY: FREQUENCY: CONTINUOUS

## 2022-06-14 ASSESSMENT — PAIN SCALES - GENERAL: PAINLEVEL_OUTOF10: 0

## 2022-06-14 ASSESSMENT — PAIN DESCRIPTION - DESCRIPTORS: DESCRIPTORS: SORE

## 2022-06-14 NOTE — PROGRESS NOTES
Physical Therapy: Daily Note   Patient: Tana Amaya (16 y.o. female)   Examination Date:   Plan of Care/Certification Expiration Date: 22    No data recorded   :  1990 # of Visits since [facility]:   7   MRN: [id number]  CSN: [id number] Start of Care Date:   2022   Insurance: Payor: Wanda Annmarie / Plan: Wanda Cava / Product Type: *No Product type* /   Insurance ID: [id number] - (Commercial) Secondary Insurance (if applicable):    Referring Physician: Madonna Ragsdale MD     PCP: Thanh Friedman DO Visits to Date/Visits Approved:     No Show/Cancelled Appts:      Medical Diagnosis: Low back pain [M54.50]  Upper back pain [M54.9]  Shoulder pain [M25.519]    Treatment Diagnosis: Upper and low back pain; Bilateral shoulder pain and reconditioning        SUBJECTIVE EXAMINATION   Pain Level: Pain Screening  Patient Currently in Pain: Yes  Pain Level: 0  Pain Location: Shoulder,Neck,Chest  Pain Orientation: Left,Right  Pain Descriptors: Sore  Pain Frequency: Continuous    Patient Comments: Subjective: Pt. states she is alittle sore working out at [employer]. Pt. states pain over the past week L side shd and chest on R and L. Pt. reports pain at most 5/10 and currently 5/10. HEP Compliance: Fair        OBJECTIVE EXAMINATION   Restrictions:  No data recorded No data recorded No data recorded              TREATMENT     Exercises:      Treatment Reasoning    Exercise 1: Doorway stretch 30 sec x 3  Exercise 9: seated PB I, Y, T x 10 ea hold 3 sec 1#  Exercise 10: B shd flexion with eccentric abduction x 5 hold 5 sec  VC to avoid hiking L shd  Exercise 11: Back against wall with mini squat engaging core of back against wall with B shoulder flexion withwand x 15'. VC's for breathing technique.                            Manual Therapy: (CPT 04756) Treatment Reasoning      MFR L UT to dec pain and tightness                          Pt Education: Plan Comment: *NO Estim or US due to last chemo March 2021; Okay for KT tape, CP/HP       ASSESSMENT     Assessment: Assessment: Pt. arrives to therapy with 5/10 L shd and chest sore and tight following 1 hour and half workout at [address] [facility]  doing HEP and trying some workout machines. Pt. states some trouble with workout machines so did what she could. Educated there are instructions on machines and always light weigth initially to avoid injury. If painful to stop doing. Pt. also looking into going to pool for hip exercies. Focused on posutral therex with addition of 1# on PB. MFR to  L UT to dec tightness and pain. Pain post 4/10. CP issued to go. Educated posutral awareness and setting shd blades prior to active use of UE's. Body Structures, Functions, Activity Limitations Requiring Skilled Therapeutic Intervention: Decreased functional mobility ,Increased pain,Decreased ROM,Decreased strength,Decreased endurance    Post-Treatment Pain Level: Activity Tolerance: Patient limited by fatigue; Patient limited by pain    Therapy Prognosis: Good       GOALS   Patient goals :  To decrease her shoulder and back pain to improve her mobility  Short Term Goals Completed by 1-2 weeks from date of recheck Current Status Goal Status   Pt reports able to complete her HEP 3-5x per week Pt able to do 3x per week Met   Pt reports back pain as 4/10 or less, right shoulder 4/10 and left shoulder 5/10 or less with ADLs Pt reports that her pain goes up to a 5/10 at times but her pain medicine as changed to PRN vs daily In progress                                                                       Long Term Goals Completed by 4-6 weeks Current Status Goal Status   Pt reports decreased back pain as 3/10 or less and shoulder pain as 3/10 or less with ADLs and with household chores Pt reports that her back and shoulder pain goes to 4/10 \"all over\" Partially met   Pt demonstrated AROM for B shoulder abduction= 120 degrees or >; Increase left shoulder flexion to 130 degrees or >; Able to perform trunk flexion and extension WFL with less reports of pain Pt has met her ROM goals for her UEs and reports less pain with movement of her trunk Met   Increased B LE strength to 4/5 to 4+/5 so pt can reach overhead into her cupboard without pain; Increase B LE and core strength to 4+/5 or > so pt can be on her feet for longer periods of time without pain Improving with her strength in all andra In progress     SPADI at recheck=48% Not Met   Pt indep with an advance HEP for her B UEs, upper/low back and legs.  Progressing towards goal In progress                                                TREATMENT PLAN   Plan Frequency: 1-2x per week  Plan weeks: 4-6  Current Treatment Recommendations: Strengthening,ROM,Neuromuscular re-education,Manual Therapy - Soft Tissue Mobilization,Manual Therapy - Joint Manipulation,Endurance training,Home exercise program,Pain management   Requires PT Follow-Up: Yes  Plan Comment: *NO Estim or US due to last chemo March 2021; Okay for KT tape, CP/HP       Therapy Time  Individual Time In:      1245   Individual Time Out:  130  Minutes:  45        Electronically signed by Kuldip Grigsby PTA  on 3/69/1721 at 1:30 PM   [address] [facility]

## 2022-06-16 ENCOUNTER — HOSPITAL ENCOUNTER (OUTPATIENT)
Dept: PHYSICAL THERAPY | Age: 32
Setting detail: THERAPIES SERIES
Discharge: HOME | End: 2022-06-16
Payer: COMMERCIAL

## 2022-06-16 NOTE — PROGRESS NOTES
Physical Therapy  Physical Therapy: Daily Note   Patient: Anitra Kowalski (89 y.o. female)   Examination Date:   Plan of Care/Certification Expiration Date: 22    No data recorded   :  1990 # of Visits since [facility]:   8   MRN: [id number]  CSN: [id number] Start of Care Date:   2022   Insurance: Payor: Baljeet Guise / Plan: Baljeet Guise / Product Type: *No Product type* /   Insurance ID: [id number] - (Commercial) Secondary Insurance (if applicable):    Referring Physician: Jaime Dawkins MD     PCP: Abdiel Patricia DO Visits to Date/Visits Approved:     No Show/Cancelled Appts: 2 / 3     Medical Diagnosis: Low back pain [M54.50]  Upper back pain [M54.9]  Shoulder pain [M25.519]    Treatment Diagnosis: Upper and low back pain; Bilateral shoulder pain and reconditioning        SUBJECTIVE EXAMINATION     Patient Comments: Subjective: Pt cx'd, unable to make it to therapy-babysitting. GOALS   Patient goals :  To decrease her shoulder and back pain to improve her mobility  Short Term Goals Completed by 1-2 weeks from date of recheck Current Status Goal Status   Pt reports able to complete her HEP 3-5x per week Pt able to do 3x per week Met   Pt reports back pain as 4/10 or less, right shoulder 4/10 and left shoulder 5/10 or less with ADLs Pt reports that her pain goes up to a 5/10 at times but her pain medicine as changed to PRN vs daily In progress                                                                       Long Term Goals Completed by 4-6 weeks Current Status Goal Status   Pt reports decreased back pain as 3/10 or less and shoulder pain as 3/10 or less with ADLs and with household chores Pt reports that her back and shoulder pain goes to 4/10 \"all over\" Partially met   Pt demonstrated AROM for B shoulder abduction= 120 degrees or >; Increase left shoulder flexion to 130 degrees or >; Able to perform trunk flexion and extension WFL with less reports of pain Pt has met her ROM goals for her UEs and reports less pain with movement of her trunk Met   Increased B LE strength to 4/5 to 4+/5 so pt can reach overhead into her cupboard without pain; Increase B LE and core strength to 4+/5 or > so pt can be on her feet for longer periods of time without pain Improving with her strength in all andra In progress     SPADI at recheck=48% Not Met   Pt indep with an advance HEP for her B UEs, upper/low back and legs.  Progressing towards goal In progress                                                TREATMENT PLAN   Plan Frequency: 1-2x per week  Plan weeks: 4-6  Current Treatment Recommendations: Strengthening,ROM,Neuromuscular re-education,Manual Therapy - Soft Tissue Mobilization,Manual Therapy - Joint Manipulation,Endurance training,Home exercise program,Pain management   Plan Comment: *NO Estim or US due to last chemo March 2021; Okay for KT tape, CP/HP       Therapy Time  Individual Time In:         Individual Time Out:    Minutes:  0-cancelled        Electronically signed by Sarah Yanez PTA  on 6/16/2022 at 12:10 PM   POC NOTE

## 2022-06-20 ENCOUNTER — HOSPITAL ENCOUNTER (OUTPATIENT)
Dept: PHYSICAL THERAPY | Age: 32
Setting detail: THERAPIES SERIES
Discharge: HOME | End: 2022-06-20
Payer: COMMERCIAL

## 2022-06-20 NOTE — PROGRESS NOTES
Physical Therapy  Physical Therapy: Daily Note   Patient: Roberto Worthy (17 y.o. female)   Examination Date:   Plan of Care/Certification Expiration Date: 22    No data recorded   :  1990 # of Visits since [facility]:   Visit count could not be calculated. Make sure you are using a visit which is associated with an episode. MRN: [id number]  CSN: [id number] Start of Care Date:   22   Insurance: Payor: [employer] / Plan: [place] / Product Type: *No Product type* /   Insurance ID: [id number] - (Commercial) Secondary Insurance (if applicable):    Referring Physician: Ezequiel Chanel MD     PCP: Cara Chowdhury DO Visits to Date/Visits Approved:     No Show/Cancelled Appts:      Medical Diagnosis: Low back pain [M54.50]  Upper back pain [M54.9]  Shoulder pain [M25.519]    Treatment Diagnosis: Upper and low back pain; Bilateral shoulder pain and reconditioning        SUBJECTIVE EXAMINATION   Pain Level:      Patient Comments: Subjective: Pt. cancelled appointment due to daughter volleyball practice. HEP Compliance:         OBJECTIVE EXAMINATION   Restrictions:  No data recorded No data recorded No data recorded              TREATMENT       Pt Education: Plan Comment: *NO Estim or US due to last chemo 2021; Okay for KT tape, CP/HP       ASSESSMENT     Assessment: Assessment: Pt. cancelled apointment due to daughter has volTempeestball. Body Structures, Functions, Activity Limitations Requiring Skilled Therapeutic Intervention: Decreased functional mobility ,Increased pain,Decreased ROM,Decreased strength,Decreased endurance    Post-Treatment Pain Level: Activity Tolerance: Patient limited by fatigue; Patient limited by pain    Therapy Prognosis: Good       GOALS   Patient goals :  To decrease her shoulder and back pain to improve her mobility  Short Term Goals Completed by 1-2 weeks from date of recheck Current Status Goal Status   Pt reports able to complete her HEP 3-5x per week Pt able to do 3x per week Met   Pt reports back pain as 4/10 or less, right shoulder 4/10 and left shoulder 5/10 or less with ADLs Pt reports that her pain goes up to a 5/10 at times but her pain medicine as changed to PRN vs daily In progress                                                                       Long Term Goals Completed by 4-6 weeks Current Status Goal Status   Pt reports decreased back pain as 3/10 or less and shoulder pain as 3/10 or less with ADLs and with household chores Pt reports that her back and shoulder pain goes to 4/10 \"all over\" Partially met   Pt demonstrated AROM for B shoulder abduction= 120 degrees or >; Increase left shoulder flexion to 130 degrees or >; Able to perform trunk flexion and extension WFL with less reports of pain Pt has met her ROM goals for her UEs and reports less pain with movement of her trunk Met   Increased B LE strength to 4/5 to 4+/5 so pt can reach overhead into her cupboard without pain; Increase B LE and core strength to 4+/5 or > so pt can be on her feet for longer periods of time without pain Improving with her strength in all andra In progress     SPADI at recheck=48% Not Met   Pt indep with an advance HEP for her B UEs, upper/low back and legs.  Progressing towards goal In progress                                                TREATMENT PLAN   Plan Frequency: 1-2x per week  Plan weeks: 4-6  Current Treatment Recommendations: Strengthening,ROM,Neuromuscular re-education,Manual Therapy - Soft Tissue Mobilization,Manual Therapy - Joint Manipulation,Endurance training,Home exercise program,Pain management   Plan Comment: *NO Estim or US due to last chemo March 2021; Okay for KT tape, CP/HP       Therapy Time  Individual Time In:  200       Individual Time Out:  205  Minutes:  0        Electronically signed by Janis Gallardo PTA  on 6/20/2022 at 2:12 PM   POC NOTE

## 2022-06-22 ENCOUNTER — APPOINTMENT (OUTPATIENT)
Dept: PHYSICAL THERAPY | Age: 32
End: 2022-06-22
Payer: COMMERCIAL

## 2022-07-26 ENCOUNTER — OFFICE VISIT (OUTPATIENT)
Dept: OBGYN CLINIC | Age: 32
End: 2022-07-26
Payer: COMMERCIAL

## 2022-07-26 VITALS
DIASTOLIC BLOOD PRESSURE: 88 MMHG | SYSTOLIC BLOOD PRESSURE: 128 MMHG | BODY MASS INDEX: 42.87 KG/M2 | WEIGHT: 242 LBS | HEART RATE: 92 BPM

## 2022-07-26 DIAGNOSIS — C50.911 MALIGNANT NEOPLASM OF RIGHT BREAST IN FEMALE, ESTROGEN RECEPTOR NEGATIVE, UNSPECIFIED SITE OF BREAST (HCC): Primary | ICD-10-CM

## 2022-07-26 DIAGNOSIS — Z17.1 MALIGNANT NEOPLASM OF RIGHT BREAST IN FEMALE, ESTROGEN RECEPTOR NEGATIVE, UNSPECIFIED SITE OF BREAST (HCC): Primary | ICD-10-CM

## 2022-07-26 PROCEDURE — 99204 OFFICE O/P NEW MOD 45 MIN: CPT | Performed by: OBSTETRICS & GYNECOLOGY

## 2022-07-26 ASSESSMENT — ENCOUNTER SYMPTOMS
WHEEZING: 0
BACK PAIN: 0
ABDOMINAL DISTENTION: 0
COLOR CHANGE: 0
COUGH: 0
SORE THROAT: 0
CHEST TIGHTNESS: 0
ABDOMINAL PAIN: 0
CONSTIPATION: 0
SHORTNESS OF BREATH: 0
VOICE CHANGE: 0
VOMITING: 0
TROUBLE SWALLOWING: 0
BLOOD IN STOOL: 0
NAUSEA: 0

## 2022-07-26 NOTE — PROGRESS NOTES
weight change. HENT:  Negative for dental problem, ear pain, hearing loss, nosebleeds, sore throat, trouble swallowing and voice change. Eyes:  Negative for visual disturbance. Respiratory:  Negative for cough, chest tightness, shortness of breath and wheezing. Cardiovascular:  Negative for chest pain and palpitations. Gastrointestinal:  Negative for abdominal distention, abdominal pain, blood in stool, constipation, nausea and vomiting. Endocrine: Negative for cold intolerance, heat intolerance, polydipsia, polyphagia and polyuria. Genitourinary:  Negative for difficulty urinating, dyspareunia, dysuria, flank pain, frequency, genital sores, hematuria, menstrual problem, pelvic pain, urgency, vaginal bleeding, vaginal discharge and vaginal pain. Musculoskeletal:  Negative for arthralgias, back pain, joint swelling and myalgias. Skin:  Negative for color change and rash. Allergic/Immunologic: Negative for environmental allergies, food allergies and immunocompromised state. Neurological:  Negative for dizziness, seizures, syncope, speech difficulty, weakness, numbness and headaches. Hematological:  Negative for adenopathy. Does not bruise/bleed easily. Psychiatric/Behavioral:  Negative for agitation, behavioral problems, confusion, decreased concentration, dysphoric mood and suicidal ideas. The patient is not nervous/anxious and is not hyperactive. Physical Exam  Vitals and nursing note reviewed. Exam conducted with a chaperone present. Constitutional:       Appearance: Normal appearance. Neurological:      Mental Status: She is alert. Vitals:    07/26/22 1354   BP: 128/88   Pulse: 92   Weight: 242 lb (109.8 kg)         ASSESSMENT/PLAN:    Tree of breast cancer  Family history of ovarian cancer  Previous hysterectomy    PLAN: Operative laparoscopy with bilateral salpingo-oophorectomy.   Risk benefits alternatives of the procedure explained to her in detail possible a

## 2022-08-01 ENCOUNTER — HOSPITAL ENCOUNTER (OUTPATIENT)
Dept: PREADMISSION TESTING | Age: 32
Discharge: HOME | End: 2022-08-05
Payer: COMMERCIAL

## 2022-08-01 VITALS
RESPIRATION RATE: 16 BRPM | HEART RATE: 67 BPM | WEIGHT: 240 LBS | DIASTOLIC BLOOD PRESSURE: 71 MMHG | TEMPERATURE: 98.9 F | BODY MASS INDEX: 42.52 KG/M2 | HEIGHT: 63 IN | OXYGEN SATURATION: 98 % | SYSTOLIC BLOOD PRESSURE: 153 MMHG

## 2022-08-01 DIAGNOSIS — Q60.0 KIDNEY CONGENITALLY ABSENT, LEFT: Chronic | ICD-10-CM

## 2022-08-01 DIAGNOSIS — Z80.41 FAMILY HX OF OVARIAN MALIGNANCY: ICD-10-CM

## 2022-08-01 PROBLEM — R16.1 SPLENOMEGALY: Status: ACTIVE | Noted: 2020-11-20

## 2022-08-01 PROBLEM — I85.00 PORTAL HYPERTENSION WITH ESOPHAGEAL VARICES (HCC): Status: ACTIVE | Noted: 2021-09-30

## 2022-08-01 PROBLEM — R11.0 NAUSEA: Status: ACTIVE | Noted: 2019-05-20

## 2022-08-01 PROBLEM — D50.0 BLOOD LOSS ANEMIA: Status: ACTIVE | Noted: 2021-04-08

## 2022-08-01 PROBLEM — K76.6 PORTAL HYPERTENSION WITH ESOPHAGEAL VARICES (HCC): Status: ACTIVE | Noted: 2021-09-30

## 2022-08-01 LAB
HCT VFR BLD CALC: 40.8 % (ref 37–47)
HEMOGLOBIN: 13.4 G/DL (ref 12–16)
MCH RBC QN AUTO: 29.3 PG (ref 27–31.3)
MCHC RBC AUTO-ENTMCNC: 32.9 % (ref 33–37)
MCV RBC AUTO: 89 FL (ref 82–100)
PDW BLD-RTO: 14.6 % (ref 11.5–14.5)
PLATELET # BLD: 118 K/UL (ref 130–400)
RBC # BLD: 4.58 M/UL (ref 4.2–5.4)
WBC # BLD: 4.4 K/UL (ref 4.8–10.8)

## 2022-08-01 PROCEDURE — 86850 RBC ANTIBODY SCREEN: CPT

## 2022-08-01 PROCEDURE — 86900 BLOOD TYPING SEROLOGIC ABO: CPT

## 2022-08-01 PROCEDURE — 86901 BLOOD TYPING SEROLOGIC RH(D): CPT

## 2022-08-01 PROCEDURE — 85027 COMPLETE CBC AUTOMATED: CPT

## 2022-08-01 RX ORDER — SODIUM CHLORIDE 0.9 % (FLUSH) 0.9 %
5-40 SYRINGE (ML) INJECTION PRN
Status: CANCELLED | OUTPATIENT
Start: 2022-08-08

## 2022-08-01 RX ORDER — LIDOCAINE HYDROCHLORIDE 10 MG/ML
1 INJECTION, SOLUTION EPIDURAL; INFILTRATION; INTRACAUDAL; PERINEURAL
Status: CANCELLED | OUTPATIENT
Start: 2022-08-08 | End: 2022-08-08

## 2022-08-01 RX ORDER — SODIUM CHLORIDE 9 MG/ML
INJECTION, SOLUTION INTRAVENOUS PRN
Status: CANCELLED | OUTPATIENT
Start: 2022-08-08

## 2022-08-01 RX ORDER — ONDANSETRON 4 MG/1
8 TABLET, ORALLY DISINTEGRATING ORAL EVERY 8 HOURS PRN
COMMUNITY

## 2022-08-01 RX ORDER — SODIUM CHLORIDE, SODIUM LACTATE, POTASSIUM CHLORIDE, CALCIUM CHLORIDE 600; 310; 30; 20 MG/100ML; MG/100ML; MG/100ML; MG/100ML
INJECTION, SOLUTION INTRAVENOUS CONTINUOUS
Status: CANCELLED | OUTPATIENT
Start: 2022-08-08

## 2022-08-01 RX ORDER — SODIUM CHLORIDE 0.9 % (FLUSH) 0.9 %
5-40 SYRINGE (ML) INJECTION EVERY 12 HOURS SCHEDULED
Status: CANCELLED | OUTPATIENT
Start: 2022-08-08

## 2022-08-01 ASSESSMENT — ENCOUNTER SYMPTOMS
COUGH: 0
TROUBLE SWALLOWING: 0
CHEST TIGHTNESS: 0
NAUSEA: 1
SORE THROAT: 0
DIARRHEA: 1
SHORTNESS OF BREATH: 0
EYES NEGATIVE: 1
CONSTIPATION: 1
STRIDOR: 0
BACK PAIN: 1
WHEEZING: 0
ABDOMINAL PAIN: 0
ALLERGIC/IMMUNOLOGIC NEGATIVE: 1

## 2022-08-01 NOTE — H&P
Nurse Practitioner History and Physical      CHIEF COMPLAINT:  surgery to remove tubes & ovaries    HISTORY OF PRESENT ILLNESS:      The patient is a 34 y.o. female with significant past medical history of hx of breast cancer & family hx of ovarian cancer who presents for operative laparoscopy & BSO  S/p hysterectomy 2016. Hx right breast cancer with right mastectomy treated with chemo & radiation. States mother had uterine cancer & some maternal relatives have had ovarian cancer. Patient voices no complaints. Scheduled for OR.   Past Medical History:        Diagnosis Date    Blood loss anemia 04/08/2021    Bursitis     Cancer ([facility] [address] 09/2016    IDC Right breast BRCA1/2 neg, ERPR <1% Her 2+ Ki67 70%  multifocal G2 with multifocal G3 DCIS 4.3 cm 8/8 LN + medial margin    Chest wall pain, chronic 07/13/2017    Chronic kidney disease     Cirrhosis of liver ([facility] [address] 05/20/2019    Degenerative disorder of bone     Degenerative joint disease of low back     Depression with anxiety 07/13/2017    Endometriosis 07/13/2017    Estrogen receptor negative tumor status 03/28/2017    Overview:  HER-2 potitive    IBS (irritable bowel syndrome)     Marijuana use 07/13/2017    Neuropathy     Osteoarthritis     Osteoporosis     PCOS (polycystic ovarian syndrome) 07/13/2017    Portal hypertension (HCC)     Prolonged emergence from general anesthesia 08/2016    trouble staying awake post-op    Restless legs syndrome     Scarring of lung     Scoliosis     Tendonitis of shoulder     Torn rotator cuff     Wound of right breast 11/01/2017     Past Surgical History:    Past Surgical History:   Procedure Laterality Date    ABDOMINAL EXPLORATION SURGERY  07/18/2017    repair of perforated cecum, incidental appedectomy    APPENDECTOMY  07/18/2017    incidental - done during ex-lap with repair of perforated cecum    BREAST BIOPSY Bilateral 01/16/2020    BILATERAL BREAST MASS EXCISIONAL BIOPSY performed by Bel Trevino MD at [address] Bilateral 01/16/2020    Excisional Breast Mass Biopsy Bilat    BREAST LUMPECTOMY Bilateral 01/16/2020    bilateral excisional biopsies (DR NAIDA POLANCO)    BREAST SURGERY Right 09/22/2016    Lumpectomy and SLN    CHOLECYSTECTOMY N/A 2004    COLONOSCOPY      ENDOSCOPY, COLON, DIAGNOSTIC      banding of esophagus -- > 7 procedures due to portal HTN & bleeding -- liver cirrhosis    HYSTERECTOMY VAGINAL Bilateral 10/17/2016    LAVH  BILATERAL  SALPINGECTOMY performed by Margot Mazariegos DO at [facility] HYSTEROSCOPY  05/16/2016    DR Sonal Oquendo    HYSTEROSCOPY  08/22/2016    FRACTIONAL D&C-OPERATIVE LAPAROSCOPY    INSERTION / REMOVAL / REPLACEMENT VENOUS ACCESS CATHETER N/A 10/27/2016    PLACEMENT VENOUS ACCESS DEVICE , PAT DONE 10-23 performed by Tiffany Leo MD at [facility] [address] 07/17/2017    MASTECTOMY Right 10/17/2016    INJECTION METHYLENE BLUE RE-EXCISION RIGHT BREAST CANCER SITE, RIGHT SENTINEL NODE BIOPSY performed by Tiffany Leo MD at [address] [facility], [address] [place] [address] Right 04/04/2017    OTHER SURGICAL HISTORY Right 08/15/2017    EXCISION OF CHEST WALL MASS (DR POLANCO @ [facility])         Medications Prior to Admission:    Current Outpatient Medications   Medication Sig Dispense Refill    ondansetron (ZOFRAN-ODT) 4 MG disintegrating tablet Take 8 mg by mouth every 8 hours as needed for Nausea or Vomiting      methadone (DOLOPHINE) 5 MG tablet Take 5 mg by mouth daily as needed for Pain.  naloxone (NARCAN) 4 MG/0.1ML LIQD nasal spray 1 spray by Nasal route      Compression Sleeve MISC USE AS DIRECTED  0     No current facility-administered medications for this encounter.        Allergies:  Octreotide, Cherry, Iodine, Lansoprazole, Zanaflex [tizanidine], and Zantac [ranitidine hcl]    Social History:   Social History     Socioeconomic History    Marital status:      Spouse name: Jacklyn Farias Number of children: Not on file    Years of education: Not on file    Highest education level: Not on file   Occupational History    Occupation: nurses aide   Tobacco Use    Smoking status: Former     Packs/day: 1.00     Years: 4.00     Pack years: 4.00     Types: Cigarettes     Start date:      Quit date: 2010     Years since quittin.9    Smokeless tobacco: Never   Vaping Use    Vaping Use: Never used   Substance and Sexual Activity    Alcohol use: Yes     Comment: occassional    Drug use: Yes     Types: Marijuana King Vaishali)     Comment: daily marijuana use    Sexual activity: Yes     Partners: Male   Other Topics Concern    Not on file   Social History Narrative    Lives with  and daughter        2 cats    1 dog    1 hamster    10 chickens        Likes to coupon     Social Determinants of Health     Financial Resource Strain: Not on file   Food Insecurity: Not on file   Transportation Needs: Not on file   Physical Activity: Not on file   Stress: Not on file   Social Connections: Not on file   Intimate Partner Violence: Not on file   Housing Stability: Not on file       Family History:       Problem Relation Age of Onset    Cancer Mother         esophagus & cervical cancer    Arthritis Mother     COPD Mother     Emphysema Mother     Diabetes Mother     Heart Disease Mother     Heart Attack Father 50    Seizures Sister     Thyroid Disease Sister     Thyroid Disease Sister     Thyroid Disease Sister     No Known Problems Sister     No Known Problems Sister     Heart Disease Brother     Breast Cancer Paternal Aunt     Endometrial Cancer Paternal Aunt     Stroke Maternal Grandmother     No Known Problems Maternal Grandfather     No Known Problems Paternal Grandmother     No Known Problems Paternal Grandfather     No Known Problems Daughter     Cervical Cancer Other        Review of Systems   Constitutional: Negative. Negative for chills and fever.    HENT:  Positive for dental problem (broken teeth) and tinnitus (bilateral). Negative for congestion, postnasal drip, sore throat and trouble swallowing. Eyes: Negative. Negative for visual disturbance. Respiratory:  Negative for cough, chest tightness, shortness of breath, wheezing and stridor. Cardiovascular:  Negative for chest pain and palpitations. Gastrointestinal:  Positive for constipation, diarrhea and nausea. Negative for abdominal pain. IB'S -- past hx bowel obstruction - OR to follow   Endocrine: Negative. Genitourinary:  Positive for pelvic pain. Negative for dysuria and frequency. Musculoskeletal:  Positive for back pain and neck pain. Negative for myalgias. Fibromyalgia -- chest wall discomfort. Skin: Negative. Allergic/Immunologic: Negative. Neurological:  Positive for numbness. Negative for dizziness, seizures, weakness and headaches. Light-headedness: fingers & feet due to chemo. Psychiatric/Behavioral: Negative. Vitals:  BP (!) 153/71   Pulse 67   Temp 98.9 °F (37.2 °C) (Temporal)   Resp 16   Ht 5' 3\" (1.6 m)   Wt 240 lb (108.9 kg)   LMP 10/08/2016 (Exact Date)   SpO2 98%   BMI 42.51 kg/m²     Physical Exam  Constitutional:       Appearance: She is well-developed. She is obese. HENT:      Head: Normocephalic and atraumatic. Right Ear: Tympanic membrane, ear canal and external ear normal.      Left Ear: Tympanic membrane, ear canal and external ear normal.      Nose: No congestion. Mouth/Throat:      Mouth: Mucous membranes are moist.   Eyes:      General: No scleral icterus. Extraocular Movements: Extraocular movements intact. Conjunctiva/sclera: Conjunctivae normal.      Pupils: Pupils are equal, round, and reactive to light. Neck:      Thyroid: No thyromegaly. Vascular: No JVD. Trachea: No tracheal deviation. Cardiovascular:      Rate and Rhythm: Normal rate and regular rhythm. Heart sounds: Normal heart sounds. No murmur heard. No gallop.    Pulmonary: Effort: Pulmonary effort is normal. No respiratory distress. Breath sounds: Normal breath sounds. No wheezing or rales. Comments: Absent right breast.  Abdominal:      General: Bowel sounds are normal.      Palpations: Abdomen is soft. Tenderness: There is abdominal tenderness (diffuse over abdomen). Comments: Obese abdomen with OR scarring -- diffusely tender. Genitourinary:     Comments: Deferred. Musculoskeletal:         General: Normal range of motion. Cervical back: Normal range of motion and neck supple. Right lower leg: No edema. Left lower leg: No edema. Lymphadenopathy:      Cervical: No cervical adenopathy. Skin:     General: Skin is warm and dry. Findings: No erythema. Neurological:      Mental Status: She is alert. Gait: Gait normal.   Psychiatric:         Mood and Affect: Mood normal.         Behavior: Behavior normal.         Thought Content:  Thought content normal.         Judgment: Judgment normal.       [unfilled]    Assessment:  Patient Active Problem List   Diagnosis    Menometrorrhagia    Malignant neoplasm of central portion of right female breast (HCC)    Pelvic pain in female    Poor venous access    Anxiety    IBS (irritable bowel syndrome)    PCOS (polycystic ovarian syndrome)    Estrogen receptor negative tumor status    Marijuana use    Depression with anxiety    Chest wall pain, chronic    Perforation of intestine (HCC)    Wound of right breast    Attention deficit hyperactivity disorder (ADHD), combined type    Cancer associated pain    Chronic midline low back pain without sciatica    Chronic pain    Cirrhosis of liver (HCC)    Esophageal varices with bleeding (HCC)    JOANN (generalized anxiety disorder)    Hematemesis with nausea    HER2-positive carcinoma of breast ([address]    Left hip pain    Malignant neoplasm of overlapping sites of right female breast ([address]    Obesity, Class II, BMI 35-39.9    Neoplastic malignant related fatigue    Opioid use agreement exists    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

## 2022-08-02 LAB
ABO/RH: NORMAL
ANTIBODY SCREEN: NORMAL

## 2022-08-03 ENCOUNTER — TELEPHONE (OUTPATIENT)
Dept: OBGYN CLINIC | Age: 32
End: 2022-08-03

## 2022-08-03 NOTE — TELEPHONE ENCOUNTER
Patient has seen CBC labs that were released to [place], she would like to discuss her results before surgery

## 2022-08-03 NOTE — TELEPHONE ENCOUNTER
Patient was concerned about wbc being slightly low. Advised patient this would not be a concern with surgery and that it normally fluctuates.

## 2022-08-08 ENCOUNTER — ANESTHESIA EVENT (OUTPATIENT)
Dept: OPERATING ROOM | Age: 32
End: 2022-08-08
Payer: COMMERCIAL

## 2022-08-08 ENCOUNTER — ANESTHESIA (OUTPATIENT)
Dept: OPERATING ROOM | Age: 32
End: 2022-08-08
Payer: COMMERCIAL

## 2022-08-08 ENCOUNTER — HOSPITAL ENCOUNTER (OUTPATIENT)
Age: 32
Setting detail: OUTPATIENT SURGERY
Discharge: HOME | End: 2022-08-08
Attending: OBSTETRICS & GYNECOLOGY | Admitting: OBSTETRICS & GYNECOLOGY
Payer: COMMERCIAL

## 2022-08-08 VITALS
SYSTOLIC BLOOD PRESSURE: 128 MMHG | WEIGHT: 240 LBS | DIASTOLIC BLOOD PRESSURE: 78 MMHG | BODY MASS INDEX: 32.51 KG/M2 | OXYGEN SATURATION: 96 % | TEMPERATURE: 98.3 F | HEART RATE: 88 BPM | RESPIRATION RATE: 16 BRPM | HEIGHT: 72 IN

## 2022-08-08 DIAGNOSIS — Z80.41 FAMILY HISTORY OF OVARIAN CANCER: ICD-10-CM

## 2022-08-08 DIAGNOSIS — G89.18 POSTOPERATIVE PAIN: Primary | ICD-10-CM

## 2022-08-08 DIAGNOSIS — C50.919 MALIGNANT NEOPLASM OF FEMALE BREAST, UNSPECIFIED ESTROGEN RECEPTOR STATUS, UNSPECIFIED LATERALITY, UNSPECIFIED SITE OF BREAST (HCC): ICD-10-CM

## 2022-08-08 PROCEDURE — 2720000010 HC SURG SUPPLY STERILE: Performed by: OBSTETRICS & GYNECOLOGY

## 2022-08-08 PROCEDURE — 6370000000 HC RX 637 (ALT 250 FOR IP): Performed by: ANESTHESIOLOGY

## 2022-08-08 PROCEDURE — 2500000003 HC RX 250 WO HCPCS: Performed by: NURSE ANESTHETIST, CERTIFIED REGISTERED

## 2022-08-08 PROCEDURE — 7100000000 HC PACU RECOVERY - FIRST 15 MIN: Performed by: OBSTETRICS & GYNECOLOGY

## 2022-08-08 PROCEDURE — 3700000001 HC ADD 15 MINUTES (ANESTHESIA): Performed by: OBSTETRICS & GYNECOLOGY

## 2022-08-08 PROCEDURE — 88342 IMHCHEM/IMCYTCHM 1ST ANTB: CPT

## 2022-08-08 PROCEDURE — 7100000011 HC PHASE II RECOVERY - ADDTL 15 MIN: Performed by: OBSTETRICS & GYNECOLOGY

## 2022-08-08 PROCEDURE — 6360000002 HC RX W HCPCS: Performed by: NURSE ANESTHETIST, CERTIFIED REGISTERED

## 2022-08-08 PROCEDURE — 6360000002 HC RX W HCPCS: Performed by: ANESTHESIOLOGY

## 2022-08-08 PROCEDURE — 7100000001 HC PACU RECOVERY - ADDTL 15 MIN: Performed by: OBSTETRICS & GYNECOLOGY

## 2022-08-08 PROCEDURE — 2580000003 HC RX 258: Performed by: NURSE PRACTITIONER

## 2022-08-08 PROCEDURE — 6370000000 HC RX 637 (ALT 250 FOR IP): Performed by: OBSTETRICS & GYNECOLOGY

## 2022-08-08 PROCEDURE — 58661 LAPAROSCOPY REMOVE ADNEXA: CPT | Performed by: OBSTETRICS & GYNECOLOGY

## 2022-08-08 PROCEDURE — 64488 TAP BLOCK BI INJECTION: CPT | Performed by: ANESTHESIOLOGY

## 2022-08-08 PROCEDURE — 3600000014 HC SURGERY LEVEL 4 ADDTL 15MIN: Performed by: OBSTETRICS & GYNECOLOGY

## 2022-08-08 PROCEDURE — 3700000000 HC ANESTHESIA ATTENDED CARE: Performed by: OBSTETRICS & GYNECOLOGY

## 2022-08-08 PROCEDURE — 7100000010 HC PHASE II RECOVERY - FIRST 15 MIN: Performed by: OBSTETRICS & GYNECOLOGY

## 2022-08-08 PROCEDURE — 2709999900 HC NON-CHARGEABLE SUPPLY: Performed by: OBSTETRICS & GYNECOLOGY

## 2022-08-08 PROCEDURE — A4217 STERILE WATER/SALINE, 500 ML: HCPCS | Performed by: OBSTETRICS & GYNECOLOGY

## 2022-08-08 PROCEDURE — 2580000003 HC RX 258: Performed by: OBSTETRICS & GYNECOLOGY

## 2022-08-08 PROCEDURE — 6360000002 HC RX W HCPCS: Performed by: NURSE PRACTITIONER

## 2022-08-08 PROCEDURE — 3600000004 HC SURGERY LEVEL 4 BASE: Performed by: OBSTETRICS & GYNECOLOGY

## 2022-08-08 PROCEDURE — 88307 TISSUE EXAM BY PATHOLOGIST: CPT

## 2022-08-08 RX ORDER — ONDANSETRON 2 MG/ML
INJECTION INTRAMUSCULAR; INTRAVENOUS PRN
Status: DISCONTINUED | OUTPATIENT
Start: 2022-08-08 | End: 2022-08-08 | Stop reason: SDUPTHER

## 2022-08-08 RX ORDER — FENTANYL CITRATE 50 UG/ML
INJECTION, SOLUTION INTRAMUSCULAR; INTRAVENOUS PRN
Status: DISCONTINUED | OUTPATIENT
Start: 2022-08-08 | End: 2022-08-08 | Stop reason: SDUPTHER

## 2022-08-08 RX ORDER — LIDOCAINE HYDROCHLORIDE 10 MG/ML
1 INJECTION, SOLUTION EPIDURAL; INFILTRATION; INTRACAUDAL; PERINEURAL
Status: DISCONTINUED | OUTPATIENT
Start: 2022-08-08 | End: 2022-08-08 | Stop reason: HOSPADM

## 2022-08-08 RX ORDER — SODIUM CHLORIDE 0.9 % (FLUSH) 0.9 %
5-40 SYRINGE (ML) INJECTION PRN
Status: DISCONTINUED | OUTPATIENT
Start: 2022-08-08 | End: 2022-08-08 | Stop reason: HOSPADM

## 2022-08-08 RX ORDER — MEPERIDINE HYDROCHLORIDE 25 MG/ML
12.5 INJECTION INTRAMUSCULAR; INTRAVENOUS; SUBCUTANEOUS
Status: DISCONTINUED | OUTPATIENT
Start: 2022-08-08 | End: 2022-08-08 | Stop reason: HOSPADM

## 2022-08-08 RX ORDER — SODIUM CHLORIDE, SODIUM LACTATE, POTASSIUM CHLORIDE, CALCIUM CHLORIDE 600; 310; 30; 20 MG/100ML; MG/100ML; MG/100ML; MG/100ML
INJECTION, SOLUTION INTRAVENOUS CONTINUOUS PRN
Status: DISCONTINUED | OUTPATIENT
Start: 2022-08-08 | End: 2022-08-08 | Stop reason: HOSPADM

## 2022-08-08 RX ORDER — SODIUM CHLORIDE 9 MG/ML
INJECTION, SOLUTION INTRAVENOUS PRN
Status: DISCONTINUED | OUTPATIENT
Start: 2022-08-08 | End: 2022-08-08 | Stop reason: HOSPADM

## 2022-08-08 RX ORDER — CHLORHEXIDINE GLUCONATE 4 G/100ML
SOLUTION TOPICAL PRN
Status: DISCONTINUED | OUTPATIENT
Start: 2022-08-08 | End: 2022-08-08 | Stop reason: HOSPADM

## 2022-08-08 RX ORDER — PROPOFOL 10 MG/ML
INJECTION, EMULSION INTRAVENOUS PRN
Status: DISCONTINUED | OUTPATIENT
Start: 2022-08-08 | End: 2022-08-08 | Stop reason: SDUPTHER

## 2022-08-08 RX ORDER — OXYCODONE HYDROCHLORIDE 5 MG/1
10 TABLET ORAL PRN
Status: COMPLETED | OUTPATIENT
Start: 2022-08-08 | End: 2022-08-08

## 2022-08-08 RX ORDER — MAGNESIUM HYDROXIDE 1200 MG/15ML
LIQUID ORAL CONTINUOUS PRN
Status: DISCONTINUED | OUTPATIENT
Start: 2022-08-08 | End: 2022-08-08 | Stop reason: HOSPADM

## 2022-08-08 RX ORDER — DIPHENHYDRAMINE HYDROCHLORIDE 50 MG/ML
12.5 INJECTION INTRAMUSCULAR; INTRAVENOUS
Status: DISCONTINUED | OUTPATIENT
Start: 2022-08-08 | End: 2022-08-08 | Stop reason: HOSPADM

## 2022-08-08 RX ORDER — SODIUM CHLORIDE 0.9 % (FLUSH) 0.9 %
5-40 SYRINGE (ML) INJECTION EVERY 12 HOURS SCHEDULED
Status: DISCONTINUED | OUTPATIENT
Start: 2022-08-08 | End: 2022-08-08 | Stop reason: HOSPADM

## 2022-08-08 RX ORDER — METOCLOPRAMIDE HYDROCHLORIDE 5 MG/ML
10 INJECTION INTRAMUSCULAR; INTRAVENOUS
Status: COMPLETED | OUTPATIENT
Start: 2022-08-08 | End: 2022-08-08

## 2022-08-08 RX ORDER — ONDANSETRON 4 MG/1
4 TABLET, ORALLY DISINTEGRATING ORAL EVERY 8 HOURS PRN
Status: DISCONTINUED | OUTPATIENT
Start: 2022-08-08 | End: 2022-08-08 | Stop reason: HOSPADM

## 2022-08-08 RX ORDER — SODIUM CHLORIDE 9 MG/ML
25 INJECTION, SOLUTION INTRAVENOUS PRN
Status: DISCONTINUED | OUTPATIENT
Start: 2022-08-08 | End: 2022-08-08 | Stop reason: HOSPADM

## 2022-08-08 RX ORDER — FENTANYL CITRATE 50 UG/ML
50 INJECTION, SOLUTION INTRAMUSCULAR; INTRAVENOUS EVERY 10 MIN PRN
Status: DISCONTINUED | OUTPATIENT
Start: 2022-08-08 | End: 2022-08-08 | Stop reason: HOSPADM

## 2022-08-08 RX ORDER — DEXAMETHASONE SODIUM PHOSPHATE 10 MG/ML
INJECTION INTRAMUSCULAR; INTRAVENOUS PRN
Status: DISCONTINUED | OUTPATIENT
Start: 2022-08-08 | End: 2022-08-08 | Stop reason: SDUPTHER

## 2022-08-08 RX ORDER — OXYCODONE HYDROCHLORIDE 5 MG/1
5 TABLET ORAL PRN
Status: COMPLETED | OUTPATIENT
Start: 2022-08-08 | End: 2022-08-08

## 2022-08-08 RX ORDER — SODIUM CHLORIDE, SODIUM LACTATE, POTASSIUM CHLORIDE, CALCIUM CHLORIDE 600; 310; 30; 20 MG/100ML; MG/100ML; MG/100ML; MG/100ML
INJECTION, SOLUTION INTRAVENOUS CONTINUOUS
Status: DISCONTINUED | OUTPATIENT
Start: 2022-08-08 | End: 2022-08-08 | Stop reason: HOSPADM

## 2022-08-08 RX ORDER — OXYCODONE HYDROCHLORIDE AND ACETAMINOPHEN 5; 325 MG/1; MG/1
1 TABLET ORAL EVERY 6 HOURS PRN
Qty: 20 TABLET | Refills: 0 | Status: SHIPPED | OUTPATIENT
Start: 2022-08-08 | End: 2022-08-13

## 2022-08-08 RX ORDER — ROPIVACAINE HYDROCHLORIDE 5 MG/ML
INJECTION, SOLUTION EPIDURAL; INFILTRATION; PERINEURAL PRN
Status: DISCONTINUED | OUTPATIENT
Start: 2022-08-08 | End: 2022-08-08 | Stop reason: SDUPTHER

## 2022-08-08 RX ORDER — LIDOCAINE HYDROCHLORIDE 20 MG/ML
INJECTION, SOLUTION INTRAVENOUS PRN
Status: DISCONTINUED | OUTPATIENT
Start: 2022-08-08 | End: 2022-08-08 | Stop reason: SDUPTHER

## 2022-08-08 RX ORDER — SUCCINYLCHOLINE/SOD CL,ISO/PF 100 MG/5ML
SYRINGE (ML) INTRAVENOUS PRN
Status: DISCONTINUED | OUTPATIENT
Start: 2022-08-08 | End: 2022-08-08 | Stop reason: SDUPTHER

## 2022-08-08 RX ORDER — MIDAZOLAM HYDROCHLORIDE 1 MG/ML
INJECTION INTRAMUSCULAR; INTRAVENOUS PRN
Status: DISCONTINUED | OUTPATIENT
Start: 2022-08-08 | End: 2022-08-08 | Stop reason: SDUPTHER

## 2022-08-08 RX ORDER — ONDANSETRON 2 MG/ML
4 INJECTION INTRAMUSCULAR; INTRAVENOUS
Status: COMPLETED | OUTPATIENT
Start: 2022-08-08 | End: 2022-08-08

## 2022-08-08 RX ORDER — ROCURONIUM BROMIDE 10 MG/ML
INJECTION, SOLUTION INTRAVENOUS PRN
Status: DISCONTINUED | OUTPATIENT
Start: 2022-08-08 | End: 2022-08-08 | Stop reason: SDUPTHER

## 2022-08-08 RX ORDER — ONDANSETRON 2 MG/ML
4 INJECTION INTRAMUSCULAR; INTRAVENOUS EVERY 6 HOURS PRN
Status: DISCONTINUED | OUTPATIENT
Start: 2022-08-08 | End: 2022-08-08 | Stop reason: HOSPADM

## 2022-08-08 RX ADMIN — FENTANYL CITRATE 100 MCG: 50 INJECTION, SOLUTION INTRAMUSCULAR; INTRAVENOUS at 14:29

## 2022-08-08 RX ADMIN — CEFAZOLIN 2000 MG: 10 INJECTION, POWDER, FOR SOLUTION INTRAVENOUS at 13:52

## 2022-08-08 RX ADMIN — FENTANYL CITRATE 50 MCG: 50 INJECTION, SOLUTION INTRAMUSCULAR; INTRAVENOUS at 14:24

## 2022-08-08 RX ADMIN — DEXAMETHASONE SODIUM PHOSPHATE 10 MG: 10 INJECTION INTRAMUSCULAR; INTRAVENOUS at 14:02

## 2022-08-08 RX ADMIN — OXYCODONE 5 MG: 5 TABLET ORAL at 17:59

## 2022-08-08 RX ADMIN — Medication 100 MG: at 13:55

## 2022-08-08 RX ADMIN — SODIUM CHLORIDE, POTASSIUM CHLORIDE, SODIUM LACTATE AND CALCIUM CHLORIDE: 600; 310; 30; 20 INJECTION, SOLUTION INTRAVENOUS at 11:04

## 2022-08-08 RX ADMIN — ROCURONIUM BROMIDE 50 MG: 10 INJECTION INTRAVENOUS at 13:56

## 2022-08-08 RX ADMIN — FENTANYL CITRATE 100 MCG: 50 INJECTION, SOLUTION INTRAMUSCULAR; INTRAVENOUS at 14:56

## 2022-08-08 RX ADMIN — MIDAZOLAM HYDROCHLORIDE 2 MG: 1 INJECTION, SOLUTION INTRAMUSCULAR; INTRAVENOUS at 13:50

## 2022-08-08 RX ADMIN — LIDOCAINE HYDROCHLORIDE 80 MG: 20 INJECTION, SOLUTION INTRAVENOUS at 13:55

## 2022-08-08 RX ADMIN — ONDANSETRON 4 MG: 2 INJECTION INTRAMUSCULAR; INTRAVENOUS at 16:01

## 2022-08-08 RX ADMIN — PROPOFOL 200 MG: 10 INJECTION, EMULSION INTRAVENOUS at 13:55

## 2022-08-08 RX ADMIN — FENTANYL CITRATE 50 MCG: 50 INJECTION, SOLUTION INTRAMUSCULAR; INTRAVENOUS at 13:55

## 2022-08-08 RX ADMIN — METOCLOPRAMIDE HYDROCHLORIDE 10 MG: 5 INJECTION INTRAMUSCULAR; INTRAVENOUS at 17:49

## 2022-08-08 RX ADMIN — SUGAMMADEX 200 MG: 100 INJECTION, SOLUTION INTRAVENOUS at 15:30

## 2022-08-08 RX ADMIN — HYDROMORPHONE HYDROCHLORIDE 0.5 MG: 1 INJECTION, SOLUTION INTRAMUSCULAR; INTRAVENOUS; SUBCUTANEOUS at 16:09

## 2022-08-08 RX ADMIN — ROPIVACAINE HYDROCHLORIDE 30 ML: 5 INJECTION, SOLUTION EPIDURAL; INFILTRATION; PERINEURAL at 14:00

## 2022-08-08 RX ADMIN — ONDANSETRON 4 MG: 2 INJECTION INTRAMUSCULAR; INTRAVENOUS at 15:03

## 2022-08-08 RX ADMIN — FENTANYL CITRATE 50 MCG: 50 INJECTION, SOLUTION INTRAMUSCULAR; INTRAVENOUS at 15:47

## 2022-08-08 RX ADMIN — HYDROMORPHONE HYDROCHLORIDE 0.5 MG: 1 INJECTION, SOLUTION INTRAMUSCULAR; INTRAVENOUS; SUBCUTANEOUS at 15:58

## 2022-08-08 RX ADMIN — ROCURONIUM BROMIDE 25 MG: 10 INJECTION INTRAVENOUS at 14:56

## 2022-08-08 ASSESSMENT — PAIN DESCRIPTION - DESCRIPTORS
DESCRIPTORS: ACHING;SHARP
DESCRIPTORS: ACHING
DESCRIPTORS: SORE

## 2022-08-08 ASSESSMENT — PAIN - FUNCTIONAL ASSESSMENT
PAIN_FUNCTIONAL_ASSESSMENT: 0-10
PAIN_FUNCTIONAL_ASSESSMENT: 0-10
PAIN_FUNCTIONAL_ASSESSMENT: ACTIVITIES ARE NOT PREVENTED

## 2022-08-08 ASSESSMENT — PAIN DESCRIPTION - LOCATION
LOCATION: ABDOMEN
LOCATION: ABDOMEN

## 2022-08-08 ASSESSMENT — PAIN DESCRIPTION - ORIENTATION
ORIENTATION: LOWER
ORIENTATION: RIGHT;LOWER
ORIENTATION: LOWER

## 2022-08-08 ASSESSMENT — PAIN SCALES - GENERAL
PAINLEVEL_OUTOF10: 8
PAINLEVEL_OUTOF10: 8
PAINLEVEL_OUTOF10: 7
PAINLEVEL_OUTOF10: 8
PAINLEVEL_OUTOF10: 9

## 2022-08-08 NOTE — PROGRESS NOTES
After ambulating to bath room-became nauseated. No heaves or emesis. Medicated for nausea. Iv fluids given.

## 2022-08-08 NOTE — ANESTHESIA PROCEDURE NOTES
Peripheral Block    Patient location during procedure: OR  Reason for block: post-op pain management and at surgeon's request  Start time: 8/8/2022 1:58 PM  End time: 8/8/2022 2:05 PM  Staffing  Performed: anesthesiologist   Anesthesiologist: Steph Mae MD  Preanesthetic Checklist  Completed: patient identified, IV checked, site marked, risks and benefits discussed, surgical/procedural consents, equipment checked, pre-op evaluation, timeout performed, anesthesia consent given, oxygen available and monitors applied/VS acknowledged  Peripheral Block   Patient position: supine  Prep: ChloraPrep  Provider prep: mask and sterile gloves (Sterile probe cover)  Patient monitoring: cardiac monitor, continuous pulse ox, frequent blood pressure checks and IV access  Block type: TAP  Laterality: bilateral  Injection technique: single-shot  Guidance: ultrasound guided  Local infiltration: ropivacaine (20 cc of NS added)  Infiltration strength: 0.5 %  Local infiltration: ropivacaine (20 cc of NS added)  Dose: 30 mL    Needle   Needle type: combined needle/nerve stimulator   Needle gauge: 21 G  Needle localization: anatomical landmarks and ultrasound guidance  Needle length: 10 cm  Assessment   Injection assessment: negative aspiration for heme, no paresthesia on injection and local visualized surrounding nerve on ultrasound  Paresthesia pain: immediately resolved  Slow fractionated injection: yes  Hemodynamics: stable  Real-time US image taken/store: yes    Additional Notes  Ultrasound image printed and saved in patient chart.     Sterile probe cover used

## 2022-08-08 NOTE — ANESTHESIA POSTPROCEDURE EVALUATION
Department of Anesthesiology  Postprocedure Note    Patient: Obdulia Brito  MRN: [id number]  YOB: 1990  Date of evaluation: 8/8/2022      Procedure Summary     Date: 08/08/22 Room / Location: [address] [place] [address]    Anesthesia Start: 1352 Anesthesia Stop: 6891    Procedure: OPERATIVE LAPAROSCOPY BILATERAL SALPINGO OOPHERECTOMY Diagnosis:       Family history of ovarian cancer      Malignant neoplasm of female breast, unspecified estrogen receptor status, unspecified laterality, unspecified site of breast ([facility] [address]      (BREAST CANCER, FAMILY HISTORY OF OVARIAN CANCER)    Surgeons: Jenni Orona DO Responsible Provider: Sara Giudo MD    Anesthesia Type: General ASA Status: 3          Anesthesia Type: General    Abisai Phase I: Abisai Score: 10    Abisai Phase II:        Anesthesia Post Evaluation    Patient location during evaluation: bedside  Patient participation: complete - patient participated  Level of consciousness: awake and awake and alert  Pain score: 0  Airway patency: patent  Nausea & Vomiting: no nausea and no vomiting  Complications: no  Cardiovascular status: blood pressure returned to baseline and hemodynamically stable  Respiratory status: acceptable and face mask  Hydration status: euvolemic

## 2022-08-08 NOTE — OP NOTE
PATIENT NAME: Emiliana Zuniga  MEDICAL RECORD NO. [id number]  SURGEON: Foreign Soria  Primary Care Physician: Funmilayo Stiles DO     PROCEDURE PERFORMED:  8/8/2022  PREOPERATIVE DIAGNOSIS: Breast cancer family history of ovarian cancer  POSTOPERATIVE DIAGNOSIS: Same severe . pelvic adhesions. 4 cm right ovarian cyst  PROCEDURE PERFORMED: Operative laparoscopy extensive lysis of adhesions and a bilateral salpingo-oophorectomy. SURGEON:  Dr. Phi Sultana:  GET  ESTIMATED BLOOD LOSS: Minimal ml  SPECIMEN: Fallopian tube segments and both ovaries  COMPLICATIONS:  None immediately appreciated. DISCUSSION: Ivy Fernandez is a 34y.o. year old female who has a history of inflammatory breast cancer. Family history of ovarian cancer. Plan is to proceed with a bilateral oophorectomy to reduce estrogen. After history and physical examination was performed, potential diagnostic and therapeutic modalities discussed with the patient. She was given opportunity to ask questions. Once answered informed consent was obtained. She was brought to operating room on 8/8/2022 for procedure. PROCEDURE: Taken to the operating room general anesthetic was administered she was then placed in the dorsal lithotomy position prepped and draped in usual fashion the bladder was drained exam under anesthesia was negative. A small infraumbilical incision was made the lower abdomen was elevated and with a 5 mm laparoscope through the end of a 5 mm blunt trocar direct visualization entry was accomplished while aiming toward the hollow of the sacrum directly in the midline entry was uneventful pneumoperitoneum is established second and eventually third access ports were placed along the midclavicular lines bilaterally under direct visualization no vascular injury is encountered the upper abdomen is visualized and is normal there are omental adhesions around the umbilicus these were lysed without difficulty.   There is extensive omental adhesions throughout the pelvis. There are filmy and avascular and they are lysed without difficulty. The bowel was then pulled out of the pelvis revealing a 4 cm right ovarian cyst omentum and bowel is adherent to this ovary as well these adhesions were lysed without difficulty the right infundibulopelvic ligament was then clamped cut suture-ligated and the ovary was placed in the cul-de-sac. In the same fashion the left ovary was hidden behind extensive omental and bowel adhesions the adhesions were lysed without difficulty then the ovary was exposed. It was normal size. The ovary was grasped and elevated the infundibulopelvic ligament was coagulated and transected with the harmonic scalpel. Both of these transections were well away from the ureters which were visualized peristalsing. The pelvis was copiously irrigated. Hemostasis was present. The infraumbilical incision was extended and a 12 blunt trocar was placed. The right ovary was removed without difficulty because it had been deflated and then the left ovary was placed in an Endo Catch and removed in its entirety. Again the pelvis was copiously irrigated. Surgicel was sprayed across the cul-de-sac and the areas where the ovaries had been removed. Again hemostasis was present. There is no evidence of any bowel bladder or ureter injury. The umbilical port site was closed the fascia was closed using a U stitch and 0 Vicryl suture and then the remaining incisions were closed with using a subcuticular stitch. All of the CO2 was aspirated. The abdomen was deflated. The patient was allowed to emerge from anesthesia was taken to the PACU in good condition she tolerated the surgery and the anesthesia well sponge and needle counts were correct    OPERATIVE FINDINGS: 4 cm right ovarian cyst normal left ovary. Extensive omental and bowel adhesions requiring an additional 30 minutes to then be able to remove the ovaries.   Again this extended the surgical time by 30 minutes. Postoperative findings were discussed with the patient's family. She was given discharge instructions, prescriptions for analgesics. She will follow up in my office in a for reevaluation. Electronically signed by Mee Smallwood DO on 8/8/22. Please note this report has been partially produced using speech recognition software and may cause contain errors related to that system including grammar, punctuation and spelling as well as words and phrases that may seem inappropriate. If there are questions or concerns please feel free to contact me to clarify.

## 2022-08-08 NOTE — ANESTHESIA PRE PROCEDURE
Extreme Vomiting    Cherry Hives    Iodine      Flushed feeling with mouth tingling.     Lansoprazole Other (See Comments)     seizures or convulsions    Zanaflex [Tizanidine]      spasms    Zantac [Ranitidine Hcl] Other (See Comments)     seizure       Problem List:    Patient Active Problem List   Diagnosis Code    Menometrorrhagia N92.1    Malignant neoplasm of central portion of right female breast ([facility] [address] C50.111    Pelvic pain in female R10.2    Poor venous access I87.8    Anxiety F41.9    IBS (irritable bowel syndrome) K58.9    PCOS (polycystic ovarian syndrome) E28.2    Estrogen receptor negative tumor status Z17.1    Marijuana use F12.90    Depression with anxiety F41.8    Chest wall pain, chronic R07.89, G89.29    Perforation of intestine (HCC) K63.1    Wound of right breast S21.001A    Attention deficit hyperactivity disorder (ADHD), combined type F90.2    Cancer associated pain G89.3    Chronic midline low back pain without sciatica M54.50, G89.29    Chronic pain G89.29    Cirrhosis of liver (HCC) K74.60    Esophageal varices with bleeding (HCC) I85.01    JOANN (generalized anxiety disorder) F41.1    Hematemesis with nausea K92.0    HER2-positive carcinoma of breast (HCC) C50.919    Left hip pain M25.552    Malignant neoplasm of overlapping sites of right female breast (HCC) C50.811    Obesity, Class II, BMI 35-39.9 E66.9    Neoplastic malignant related fatigue R53.0    Opioid use agreement exists Z79.891    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    Thrombocytopenia (HCC) D69.6    Ulcer of esophagus with bleeding K22.11    Left breast mass N63.20    Mass of right chest wall R22.2    Obesity, morbid, BMI 40.0-49.9 (HCC) E66.01    Malignant neoplasm of overlapping sites of right breast in female, estrogen receptor negative ([facility] [address] C50.811, Z17.1    Blood loss anemia D50.0    Congenital single kidney Q60.0    Nausea R11.0    Portal hypertension with esophageal varices (HCC) K76.6, I85.00    Splenomegaly R16.1    Family hx of ovarian malignancy Z80.41    Kidney congenitally absent, left Q60.0       Past Medical History:        Diagnosis Date    Blood loss anemia 2021    Bursitis     Cancer ([facility] [address] 2016    IDC Right breast BRCA1/2 neg, ERPR <1% Her 2+ Ki67 70%  multifocal G2 with multifocal G3 DCIS 4.3 cm 8/8 LN + medial margin    Chest wall pain, chronic 2017    Chronic kidney disease     Cirrhosis of liver ([facility] [address] 2019    Degenerative disorder of bone     Degenerative joint disease of low back     Depression with anxiety 2017    Endometriosis 2017    Estrogen receptor negative tumor status 2017    Overview:  HER-2 potitive    Hiatal hernia     IBS (irritable bowel syndrome)     Marijuana use 2017    Neuropathy     Osteoarthritis     Osteoporosis     PCOS (polycystic ovarian syndrome) 2017    Portal hypertension ([facility] [address]     Prolonged emergence from general anesthesia 2016    trouble staying awake post-op    Restless legs syndrome     Scarring of lung     Scoliosis     Tendonitis of shoulder     Torn rotator cuff     Wound of right breast 2017       Past Surgical History:        Procedure Laterality Date    ABDOMINAL EXPLORATION SURGERY  2017    repair of perforated cecum, incidental appedectomy    APPENDECTOMY  2017    incidental - done during ex-lap with repair of perforated cecum    BREAST BIOPSY Bilateral 2020    BILATERAL BREAST MASS EXCISIONAL BIOPSY performed by Michael Wang MD at [facility] Bilateral 2020    Excisional Breast Mass Biopsy Bilat    BREAST LUMPECTOMY Bilateral 2020    bilateral excisional biopsies (DR NAIDA POLANCO)    BREAST SURGERY Right 2016    Lumpectomy and SLN    CHOLECYSTECTOMY N/A     COLONOSCOPY      ENDOSCOPY, COLON, DIAGNOSTIC      banding of esophagus -- > 7 procedures due to portal HTN & bleeding -- liver cirrhosis    HYSTERECTOMY VAGINAL Bilateral 10/17/2016    LAVH  BILATERAL  SALPINGECTOMY performed by Dio Bustamante DO at [facility] HYSTEROSCOPY  2016    DR Alec Varela    HYSTEROSCOPY  2016    FRACTIONAL D&C-OPERATIVE LAPAROSCOPY    INSERTION / REMOVAL / REPLACEMENT VENOUS ACCESS CATHETER N/A 10/27/2016    PLACEMENT VENOUS ACCESS DEVICE , PAT DONE 10-23 performed by Jennifer García MD at [address] N/A 2017    MASTECTOMY Right 10/17/2016    INJECTION METHYLENE BLUE RE-EXCISION RIGHT BREAST CANCER SITE, RIGHT SENTINEL NODE BIOPSY performed by Jennifer García MD at [address] [facility], [address] [place] [address] Right 2017    OTHER SURGICAL HISTORY Right 08/15/2017    EXCISION OF CHEST WALL MASS (DR POLANCO @ [facility])       Social History:    Social History     Tobacco Use    Smoking status: Former     Packs/day: 1.00     Years: 4.00     Pack years: 4.00     Types: Cigarettes     Start date:      Quit date: 2010     Years since quittin.9    Smokeless tobacco: Never   Substance Use Topics    Alcohol use: Yes     Comment: occassional                                Counseling given: Not Answered      Vital Signs (Current):   Vitals:    22 1043   BP: 135/78   Pulse: 90   Resp: 18   Temp: 97.1 °F (36.2 °C)   TempSrc: Temporal   SpO2: 98%   Weight: 240 lb (108.9 kg)   Height: 6' 3\" (1.905 m)                                              BP Readings from Last 3 Encounters:   22 135/78   22 (!) 153/71   22 128/88       NPO Status: Time of last liquid consumption: 1130                        Time of last solid consumption: 2100                        Date of last liquid consumption: 22                        Date of last solid food consumption: 22    BMI:   Wt Readings from Last 3 Encounters:   22 240 lb (108.9 kg)   22 240 lb (108.9 kg)   22 242 lb (109.8 kg) Body mass index is 30 kg/m². CBC:   Lab Results   Component Value Date/Time    WBC 4.4 08/01/2022 05:15 PM    RBC 4.58 08/01/2022 05:15 PM    HGB 13.4 08/01/2022 05:15 PM    HCT 40.8 08/01/2022 05:15 PM    MCV 89.0 08/01/2022 05:15 PM    RDW 14.6 08/01/2022 05:15 PM     08/01/2022 05:15 PM       CMP:   Lab Results   Component Value Date/Time     01/03/2022 12:01 PM    K 3.8 01/03/2022 12:01 PM     01/03/2022 12:01 PM    CO2 24 01/03/2022 12:01 PM    BUN 8 01/03/2022 12:01 PM    CREATININE 0.44 01/03/2022 12:01 PM    GFRAA >60.0 01/03/2022 12:01 PM    LABGLOM >60.0 01/03/2022 12:01 PM    GLUCOSE 84 01/03/2022 12:01 PM    PROT 7.5 01/03/2022 12:01 PM    CALCIUM 9.1 01/03/2022 12:01 PM    BILITOT 0.3 01/03/2022 12:01 PM    ALKPHOS 66 01/03/2022 12:01 PM    AST 24 01/03/2022 12:01 PM    ALT 22 01/03/2022 12:01 PM       POC Tests: No results for input(s): POCGLU, POCNA, POCK, POCCL, POCBUN, POCHEMO, POCHCT in the last 72 hours.     Coags:   Lab Results   Component Value Date/Time    PROTIME 12.9 01/13/2020 11:08 AM    INR 0.9 01/13/2020 11:08 AM    APTT 30.2 01/13/2020 11:08 AM       HCG (If Applicable):   Lab Results   Component Value Date    PREGTESTUR Negative 06/02/2016        ABGs: No results found for: PHART, PO2ART, BMW2ZNC, GQJ2ZRG, BEART, T7CORYNM     Type & Screen (If Applicable):  No results found for: LABABO, LABRH    Drug/Infectious Status (If Applicable):  No results found for: HIV, HEPCAB    COVID-19 Screening (If Applicable): No results found for: COVID19        Anesthesia Evaluation    Airway: Mallampati: II  TM distance: >3 FB   Neck ROM: full  Mouth opening: > = 3 FB   Dental:          Pulmonary:Negative Pulmonary ROS breath sounds clear to auscultation                             Cardiovascular:          ECG reviewed  Rhythm: regular                      Neuro/Psych:   (+) psychiatric history:            GI/Hepatic/Renal:   (+) hiatal hernia, PUD, liver disease: portal hypertension, coagulopathy from hepatic dysfunction, esophageal varices,           Endo/Other:    (+) blood dyscrasia: thrombocytopenia:., malignancy/cancer. Abdominal:             Vascular: negative vascular ROS. Other Findings:           Anesthesia Plan      general and regional     ASA 3     (US guided TAP block)  Induction: intravenous. MIPS: Prophylactic antiemetics administered. Anesthetic plan and risks discussed with patient. Use of blood products discussed with patient whom consented to blood products. Plan discussed with CRNA.     Attending anesthesiologist reviewed and agrees with Preprocedure content      Post-op pain plan if not by surgeon: single peripheral nerve block            Arun Scott MD   8/8/2022

## 2022-08-22 ENCOUNTER — OFFICE VISIT (OUTPATIENT)
Dept: SURGERY | Age: 32
End: 2022-08-22
Payer: COMMERCIAL

## 2022-08-22 VITALS
TEMPERATURE: 97.9 F | HEART RATE: 82 BPM | DIASTOLIC BLOOD PRESSURE: 88 MMHG | WEIGHT: 244 LBS | OXYGEN SATURATION: 98 % | BODY MASS INDEX: 43.23 KG/M2 | RESPIRATION RATE: 18 BRPM | SYSTOLIC BLOOD PRESSURE: 138 MMHG | HEIGHT: 63 IN

## 2022-08-22 DIAGNOSIS — E66.01 SEVERE OBESITY (BMI >= 40) (HCC): ICD-10-CM

## 2022-08-22 DIAGNOSIS — Z12.31 BREAST CANCER SCREENING BY MAMMOGRAM: ICD-10-CM

## 2022-08-22 DIAGNOSIS — C50.811 MALIGNANT NEOPLASM OF OVERLAPPING SITES OF RIGHT BREAST IN FEMALE, ESTROGEN RECEPTOR NEGATIVE (HCC): Primary | ICD-10-CM

## 2022-08-22 DIAGNOSIS — Z17.1 MALIGNANT NEOPLASM OF OVERLAPPING SITES OF RIGHT BREAST IN FEMALE, ESTROGEN RECEPTOR NEGATIVE (HCC): Primary | ICD-10-CM

## 2022-08-22 PROCEDURE — 99215 OFFICE O/P EST HI 40 MIN: CPT | Performed by: SURGERY

## 2022-08-22 NOTE — PROGRESS NOTES
PATIENT:    Loenel Bee     DATE:      8/22/2022     TIME: 1:09 PM     Subjective:     Leonel Bee presents for follow-up of breast cancer. She just had a oophorectomy. She sees med onc at [facility]    The breast cancer is Cancer Staging  Malignant neoplasm of central portion of right female breast ([facility] [address]  Staging form: Breast, AJCC 8th Edition  - Clinical: Stage IIIB (cT4d, cN3, cM0(i+), G3, ER-, IA-, HER2+) - Signed by Louie Walden MD on 12/2/2019       She does perform self breast exams. She denies breast pain, masses, skin changes, nipple discharge, nipple retraction, or recent breast trauma left. Patient's medications, allergies, past medical, surgical, social and family histories were reviewed and updated as appropriate. Current Outpatient Medications on File Prior to Visit   Medication Sig Dispense Refill    methadone (DOLOPHINE) 5 MG tablet Take 2.5 mg by mouth daily as needed for Pain. MELATONIN PO Take 20 mg by mouth      ondansetron (ZOFRAN-ODT) 4 MG disintegrating tablet Take 8 mg by mouth every 8 hours as needed for Nausea or Vomiting      naloxone 4 MG/0.1ML LIQD nasal spray 1 spray by Nasal route      Compression Sleeve MISC USE AS DIRECTED  0     No current facility-administered medications on file prior to visit. Any over the counter meds / herbal supplements / vitamins: yes        Objective:     Vitals:    08/22/22 1245   BP: 138/88   Pulse: 82   Resp: 18   Temp: 97.9 °F (36.6 °C)   SpO2: 98%   Weight: 244 lb (110.7 kg)   Height: 5' 3\" (1.6 m)        Lymphedema Screening 8/22/2022   RIGHT 10 cm (Hand) 21.5   RIGHT 20 cm (Wrist) 16.5   RIGHT 30 cm (Forearm) 27   RIGHT 60 cm (Upper Arm) 39          Fatou Jacobson is a 34y.o.  year old pleasant   female in no apparent distress. The patient was examined in the seated and supine position with the motion of the pectoralis muscles bilaterally. There are no masses visualized in the neck.   There is no cervical, alcohol intake were performed today. I personally and independently saw and examined patient and reviewed all pertinent laboratory data and imaging studies. I have reviewed and agree with the history and review of systems as documented in the above note. This document is generated, in part, by voice recognition software and thus syntax and grammatical errors are possible. Dictated by Nicholas Aranda MD 8/22/2022 1:09 PM       This note was partially generated using Dragon voice recognition system, and there may be some incorrect words, spellings, punctuation that were not noticed in checking the note before saving.

## 2022-08-22 NOTE — PATIENT INSTRUCTIONS
skin from sunburn and insect bites. Use an electric razor if you shave your armpit. It's less likely to cut or irritate your skin. Activity    Don't wear clothing or jewelry that is tight on your arm or hand. Your doctor may advise you not to wear a watch or rings on the affected hand. Wear gloves when you do activities that could hurt the skin on your fingers or hand. Wear them when you garden, do yard work, wash dishes, and clean with chemicals. Use oven mitts when you handle hot food. Do not have blood drawn from the arm on the side of the lymph node surgery. Do not get injections (shots) or have an IV put in the affected arm. Do not allow a blood pressure cuff to be placed on that arm. If you are in the hospital, make sure you tell your nurse and other hospital staff about your condition. Do not expose your arm to very hot or very cold temperatures. For example, don't use hot tubs, saunas, or steam rooms. Don't use a heating pad or cold pack on that arm or shoulder. Rest your arm often when you do repeated movements, such as vacuum, scrub, or mop. Try to use your other arm to carry heavy things, such as grocery bags. If you carry a briefcase or a purse, avoid shoulder straps and carry it on your good arm. Ask your doctor about wearing a compression sleeve and glove (gauntlet). Your doctor may want you to wear these when you exercise or when you fly in an airplane. They can help keep fluid from pooling in your arm and hand. Exercise    Ask your doctor about exercises for your arm and hand. Your doctor may recommend that you see a physical therapist. This person can teach you how to do self-massage to move fluid out of your arm. Check with your doctor before you start exercises that use the arm. This includes tennis, rowing, or weight lifting. Your doctor can help you find an activity level that's right for you. When should you call for help?    Call your doctor now or seek immediate medical care if:    You have signs of infection, such as: Increased pain, swelling, warmth, or redness. Red streaks leading from the area. Pus draining from the area. A fever. Watch closely for changes in your health, and be sure to contact your doctor if:    You have new or worse symptoms from lymphedema. You do not get better as expected. Where can you learn more? Go to [email or web address]://[email or web address]. org and sign in to your News in Shorts account. Enter G546 in the Loyalize box to learn more about \"Preventing Lymphedema After Treatment for Breast Cancer: Care Instructions. \"     If you do not have an account, please click on the \"Sign Up Now\" link. Current as of: September 8, 2021               Content Version: 13.3  © 8883-3429 Healthwise, Incorporated. Care instructions adapted under license by [facility] ([facility]). If you have questions about a medical condition or this instruction, always ask your healthcare professional. [place] [address] any warranty or liability for your use of this information.

## 2022-08-22 NOTE — PROGRESS NOTES
Reason for today's visit:  6 month follow up right breast cancer    Palpates a breast change? Denies left breast  Nipple discharge: denies left nipple  Breast Pain: no  Breast Mass: no  Swelling in either upper extremity?  No, more trunk swelling    Wellness:    Self breast self exams: yes   Regular exercise routine: yes   Following Lymphedema awareness/precautions: yes   Managing stress:yes  Stress level on a scale of 1 - 10: 3    Instruction:    Follow up per provider  Imaging per provider  Monthly self breast exams  Healthy diet  Exercise program  Lymphedema precautions/awareness    Other:    Requisitions needed:no  Bras/prosthesis: no  Compression Sleeve/glove: no  Lymphedema Measurements: see flow sheet  Therapy: no  PT/OT/Lymphedema: no  Follow up as advised per Dr Elisabet Ruiz    Lymphedema Screening 8/22/2022   RIGHT 10 cm (Hand) 21.5   RIGHT 20 cm (Wrist) 16.5   RIGHT 30 cm (Forearm) 27   RIGHT 60 cm (Upper Arm) 39

## 2022-08-25 ENCOUNTER — OFFICE VISIT (OUTPATIENT)
Dept: OBGYN CLINIC | Age: 32
End: 2022-08-25
Payer: COMMERCIAL

## 2022-08-25 VITALS
HEART RATE: 84 BPM | WEIGHT: 244 LBS | BODY MASS INDEX: 43.22 KG/M2 | DIASTOLIC BLOOD PRESSURE: 68 MMHG | SYSTOLIC BLOOD PRESSURE: 108 MMHG

## 2022-08-25 DIAGNOSIS — Z09 POSTOPERATIVE EXAMINATION: Primary | ICD-10-CM

## 2022-08-25 PROCEDURE — 99213 OFFICE O/P EST LOW 20 MIN: CPT | Performed by: OBSTETRICS & GYNECOLOGY

## 2022-08-25 NOTE — PROGRESS NOTES
HPI:  Barbera Gowers (: 1990) is a 34 y.o. female, Established patient, here for evaluation of the following chief complaint(s):  Post-Op Check  Is doing well postoperatively. She had estrogen reducing bilateral salpingo-oophorectomy. No postoperative complications.   She denies vasomotor symptoms      SUBJECTIVE/OBJECTIVE:    Past Surgical History:   Procedure Laterality Date    ABDOMINAL EXPLORATION SURGERY  2017    repair of perforated cecum, incidental appedectomy    APPENDECTOMY  2017    incidental - done during ex-lap with repair of perforated cecum    BREAST BIOPSY Bilateral 2020    BILATERAL BREAST MASS EXCISIONAL BIOPSY performed by Carlyn Fernandez MD at [address] [place] [address] Bilateral 2020    Excisional Breast Mass Biopsy Bilat    BREAST LUMPECTOMY Bilateral 2020    bilateral excisional biopsies (DR NAIDA POLANCO)    BREAST SURGERY Right 2016    Lumpectomy and SLN    CHOLECYSTECTOMY N/A     COLONOSCOPY      ENDOSCOPY, COLON, DIAGNOSTIC      banding of esophagus -- > 7 procedures due to portal HTN & bleeding -- liver cirrhosis    HYSTERECTOMY VAGINAL Bilateral 10/17/2016    LAVH  BILATERAL  SALPINGECTOMY performed by Gallo Aponte DO at [address]  2016    DR Omega Guzmán    HYSTEROSCOPY  2016    FRACTIONAL D&C-OPERATIVE LAPAROSCOPY    INSERTION / REMOVAL / REPLACEMENT VENOUS ACCESS CATHETER N/A 10/27/2016    PLACEMENT VENOUS ACCESS DEVICE , PAT DONE 10-23 performed by Althea Jacobo MD at [facility] 2022    OPERATIVE LAPAROSCOPY BILATERAL SALPINGO OOPHERECTOMY performed by Gallo Aponte DO at [facility]  N/A 2017    MASTECTOMY Right 10/17/2016    INJECTION METHYLENE BLUE RE-EXCISION RIGHT BREAST CANCER SITE, RIGHT SENTINEL NODE [facility]OPSY [facility]    OTHER SURGICAL HISTORY Right 08/15/2017    EXCISION OF CHEST WALL MASS (DR POLANCO @ [facility])    SALPINGO-OOPHORECTOMY Bilateral 08/08/2022        Review of Systems    Physical Exam    Vitals:    08/25/22 1419   BP: 108/68   Pulse: 84   Weight: 244 lb (110.7 kg)         ASSESSMENT/PLAN:    Stop exam following operative laparoscopy and a bilateral oophorectomy. Benign pathology    PLAN: To follow-up in 1 year or as needed no restrictions      No follow-ups on file. On this date 8/25/2022 I have spent 20 minutes reviewing previous notes, test results and face to face with the patient discussing the diagnosis and importance of compliance with the treatment plan as well as documenting on the day of the visit. An electronic signature was used to authenticate this note. Please note this report has been partially produced using speech recognition software and may cause contain errors related to that system including grammar, punctuation and spelling as well as words and phrases that may seem inappropriate. If there are questions or concerns please feel free to contact me to clarify.

## 2022-09-26 ENCOUNTER — HOSPITAL ENCOUNTER (OUTPATIENT)
Dept: WOMENS IMAGING | Age: 32
Discharge: HOME | End: 2022-09-28
Payer: COMMERCIAL

## 2022-09-26 DIAGNOSIS — Z12.31 BREAST CANCER SCREENING BY MAMMOGRAM: ICD-10-CM

## 2022-09-26 PROCEDURE — 77063 BREAST TOMOSYNTHESIS BI: CPT

## 2023-01-25 ENCOUNTER — HOSPITAL ENCOUNTER (EMERGENCY)
Age: 33
Discharge: HOME | End: 2023-01-25
Payer: COMMERCIAL

## 2023-01-25 ENCOUNTER — APPOINTMENT (OUTPATIENT)
Dept: GENERAL RADIOLOGY | Age: 33
End: 2023-01-25
Payer: COMMERCIAL

## 2023-01-25 VITALS
SYSTOLIC BLOOD PRESSURE: 156 MMHG | RESPIRATION RATE: 16 BRPM | HEIGHT: 62 IN | WEIGHT: 250 LBS | BODY MASS INDEX: 46.01 KG/M2 | TEMPERATURE: 98.4 F | OXYGEN SATURATION: 100 % | HEART RATE: 84 BPM | DIASTOLIC BLOOD PRESSURE: 104 MMHG

## 2023-01-25 DIAGNOSIS — M25.552 LEFT HIP PAIN: Primary | ICD-10-CM

## 2023-01-25 DIAGNOSIS — M70.72 BURSITIS OF LEFT HIP, UNSPECIFIED BURSA: ICD-10-CM

## 2023-01-25 PROCEDURE — 73502 X-RAY EXAM HIP UNI 2-3 VIEWS: CPT

## 2023-01-25 PROCEDURE — 6370000000 HC RX 637 (ALT 250 FOR IP)

## 2023-01-25 PROCEDURE — 99283 EMERGENCY DEPT VISIT LOW MDM: CPT

## 2023-01-25 RX ORDER — PREDNISONE 20 MG/1
40 TABLET ORAL ONCE
Status: COMPLETED | OUTPATIENT
Start: 2023-01-25 | End: 2023-01-25

## 2023-01-25 RX ORDER — HYDROCODONE BITARTRATE AND ACETAMINOPHEN 5; 325 MG/1; MG/1
1 TABLET ORAL EVERY 8 HOURS PRN
Qty: 10 TABLET | Refills: 0 | Status: SHIPPED | OUTPATIENT
Start: 2023-01-25 | End: 2023-01-28

## 2023-01-25 RX ORDER — HYDROCODONE BITARTRATE AND ACETAMINOPHEN 5; 325 MG/1; MG/1
1 TABLET ORAL ONCE
Status: COMPLETED | OUTPATIENT
Start: 2023-01-25 | End: 2023-01-25

## 2023-01-25 RX ORDER — PREDNISONE 20 MG/1
40 TABLET ORAL DAILY
Qty: 10 TABLET | Refills: 0 | Status: SHIPPED | OUTPATIENT
Start: 2023-01-25 | End: 2023-01-30

## 2023-01-25 RX ADMIN — HYDROCODONE BITARTRATE AND ACETAMINOPHEN 1 TABLET: 5; 325 TABLET ORAL at 17:05

## 2023-01-25 RX ADMIN — PREDNISONE 40 MG: 20 TABLET ORAL at 17:05

## 2023-01-25 ASSESSMENT — PAIN DESCRIPTION - LOCATION
LOCATION: HIP

## 2023-01-25 ASSESSMENT — LIFESTYLE VARIABLES
HOW OFTEN DO YOU HAVE A DRINK CONTAINING ALCOHOL: NEVER
HOW MANY STANDARD DRINKS CONTAINING ALCOHOL DO YOU HAVE ON A TYPICAL DAY: PATIENT DOES NOT DRINK

## 2023-01-25 ASSESSMENT — PAIN DESCRIPTION - ORIENTATION
ORIENTATION: LEFT

## 2023-01-25 ASSESSMENT — ENCOUNTER SYMPTOMS
SORE THROAT: 0
ABDOMINAL PAIN: 0
BACK PAIN: 0
DIARRHEA: 0
NAUSEA: 0
COUGH: 0
VOMITING: 0
SHORTNESS OF BREATH: 0

## 2023-01-25 ASSESSMENT — PAIN DESCRIPTION - DESCRIPTORS
DESCRIPTORS: PRESSURE

## 2023-01-25 ASSESSMENT — PAIN - FUNCTIONAL ASSESSMENT
PAIN_FUNCTIONAL_ASSESSMENT: 0-10
PAIN_FUNCTIONAL_ASSESSMENT: NONE - DENIES PAIN

## 2023-01-25 ASSESSMENT — PAIN DESCRIPTION - ONSET: ONSET: ON-GOING

## 2023-01-25 ASSESSMENT — PAIN DESCRIPTION - FREQUENCY: FREQUENCY: CONTINUOUS

## 2023-01-25 ASSESSMENT — PAIN DESCRIPTION - PAIN TYPE: TYPE: ACUTE PAIN

## 2023-01-25 ASSESSMENT — PAIN SCALES - GENERAL
PAINLEVEL_OUTOF10: 4

## 2023-01-25 NOTE — ED TRIAGE NOTES
Pt a&o x4. Hx BCA. Presents with Left hip pain, states has hx of mets of hip and is worried about CA. Pt denies injury. No redness, swelling noted. States pain 4/10 constant pressure. Amb with steady gait. Denies any other complaints. Resp easy and even. Lungs clear bilat. Skin p/w/d.
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
## 2023-01-25 NOTE — Clinical Note
Jimbo Marcum was seen and treated in our emergency department on 1/25/2023. She may return to work on 01/26/2023. If you have any questions or concerns, please don't hesitate to call.       ANN Stockton - CNP

## 2023-10-10 ENCOUNTER — OFFICE VISIT (OUTPATIENT)
Dept: FAMILY MEDICINE CLINIC | Age: 33
End: 2023-10-10
Payer: COMMERCIAL

## 2023-10-10 VITALS
OXYGEN SATURATION: 100 % | BODY MASS INDEX: 43.43 KG/M2 | HEART RATE: 99 BPM | SYSTOLIC BLOOD PRESSURE: 130 MMHG | HEIGHT: 62 IN | DIASTOLIC BLOOD PRESSURE: 84 MMHG | WEIGHT: 236 LBS

## 2023-10-10 DIAGNOSIS — C50.811 MALIGNANT NEOPLASM OF OVERLAPPING SITES OF RIGHT BREAST IN FEMALE, ESTROGEN RECEPTOR NEGATIVE (HCC): ICD-10-CM

## 2023-10-10 DIAGNOSIS — D69.6 THROMBOCYTOPENIA (HCC): ICD-10-CM

## 2023-10-10 DIAGNOSIS — I49.3 PVC (PREMATURE VENTRICULAR CONTRACTION): ICD-10-CM

## 2023-10-10 DIAGNOSIS — Z17.1 MALIGNANT NEOPLASM OF OVERLAPPING SITES OF RIGHT BREAST IN FEMALE, ESTROGEN RECEPTOR NEGATIVE (HCC): ICD-10-CM

## 2023-10-10 DIAGNOSIS — K76.6 PORTAL HYPERTENSION WITH ESOPHAGEAL VARICES (HCC): ICD-10-CM

## 2023-10-10 DIAGNOSIS — K74.60 HEPATIC CIRRHOSIS, UNSPECIFIED HEPATIC CIRRHOSIS TYPE, UNSPECIFIED WHETHER ASCITES PRESENT (HCC): ICD-10-CM

## 2023-10-10 DIAGNOSIS — I85.00 PORTAL HYPERTENSION WITH ESOPHAGEAL VARICES (HCC): ICD-10-CM

## 2023-10-10 DIAGNOSIS — K92.1 GASTROINTESTINAL HEMORRHAGE WITH MELENA: Primary | ICD-10-CM

## 2023-10-10 DIAGNOSIS — I85.11 SECONDARY ESOPHAGEAL VARICES WITH BLEEDING (HCC): ICD-10-CM

## 2023-10-10 DIAGNOSIS — G25.81 RESTLESS LEG: ICD-10-CM

## 2023-10-10 PROCEDURE — 99214 OFFICE O/P EST MOD 30 MIN: CPT | Performed by: STUDENT IN AN ORGANIZED HEALTH CARE EDUCATION/TRAINING PROGRAM

## 2023-10-10 RX ORDER — ACETAMINOPHEN 500 MG
500 TABLET ORAL EVERY 6 HOURS PRN
COMMUNITY

## 2023-10-10 RX ORDER — OMEPRAZOLE 40 MG/1
40 CAPSULE, DELAYED RELEASE ORAL DAILY
COMMUNITY
Start: 2023-10-04

## 2023-10-10 RX ORDER — CARVEDILOL 3.12 MG/1
3.12 TABLET ORAL 2 TIMES DAILY WITH MEALS
COMMUNITY
Start: 2023-10-04

## 2023-10-10 SDOH — ECONOMIC STABILITY: HOUSING INSECURITY
IN THE LAST 12 MONTHS, WAS THERE A TIME WHEN YOU DID NOT HAVE A STEADY PLACE TO SLEEP OR SLEPT IN A SHELTER (INCLUDING NOW)?: NO

## 2023-10-10 SDOH — ECONOMIC STABILITY: FOOD INSECURITY: WITHIN THE PAST 12 MONTHS, THE FOOD YOU BOUGHT JUST DIDN'T LAST AND YOU DIDN'T HAVE MONEY TO GET MORE.: NEVER TRUE

## 2023-10-10 SDOH — ECONOMIC STABILITY: INCOME INSECURITY: HOW HARD IS IT FOR YOU TO PAY FOR THE VERY BASICS LIKE FOOD, HOUSING, MEDICAL CARE, AND HEATING?: NOT HARD AT ALL

## 2023-10-10 SDOH — ECONOMIC STABILITY: FOOD INSECURITY: WITHIN THE PAST 12 MONTHS, YOU WORRIED THAT YOUR FOOD WOULD RUN OUT BEFORE YOU GOT MONEY TO BUY MORE.: NEVER TRUE

## 2023-10-10 ASSESSMENT — PATIENT HEALTH QUESTIONNAIRE - PHQ9
2. FEELING DOWN, DEPRESSED OR HOPELESS: 0
SUM OF ALL RESPONSES TO PHQ9 QUESTIONS 1 & 2: 0
SUM OF ALL RESPONSES TO PHQ QUESTIONS 1-9: 0
9. THOUGHTS THAT YOU WOULD BE BETTER OFF DEAD, OR OF HURTING YOURSELF: 0
SUM OF ALL RESPONSES TO PHQ QUESTIONS 1-9: 0
6. FEELING BAD ABOUT YOURSELF - OR THAT YOU ARE A FAILURE OR HAVE LET YOURSELF OR YOUR FAMILY DOWN: 0
SUM OF ALL RESPONSES TO PHQ QUESTIONS 1-9: 0
7. TROUBLE CONCENTRATING ON THINGS, SUCH AS READING THE NEWSPAPER OR WATCHING TELEVISION: 0
10. IF YOU CHECKED OFF ANY PROBLEMS, HOW DIFFICULT HAVE THESE PROBLEMS MADE IT FOR YOU TO DO YOUR WORK, TAKE CARE OF THINGS AT HOME, OR GET ALONG WITH OTHER PEOPLE: 0
SUM OF ALL RESPONSES TO PHQ QUESTIONS 1-9: 0
4. FEELING TIRED OR HAVING LITTLE ENERGY: 0
8. MOVING OR SPEAKING SO SLOWLY THAT OTHER PEOPLE COULD HAVE NOTICED. OR THE OPPOSITE, BEING SO FIGETY OR RESTLESS THAT YOU HAVE BEEN MOVING AROUND A LOT MORE THAN USUAL: 0
5. POOR APPETITE OR OVEREATING: 0
3. TROUBLE FALLING OR STAYING ASLEEP: 0
1. LITTLE INTEREST OR PLEASURE IN DOING THINGS: 0

## 2023-10-10 ASSESSMENT — ENCOUNTER SYMPTOMS
SHORTNESS OF BREATH: 0
COUGH: 0
VOMITING: 0
SORE THROAT: 0
SINUS PRESSURE: 0
ABDOMINAL PAIN: 0
TROUBLE SWALLOWING: 1

## 2023-10-12 DIAGNOSIS — G25.81 RESTLESS LEG: ICD-10-CM

## 2024-03-29 ENCOUNTER — APPOINTMENT (OUTPATIENT)
Dept: RADIOLOGY | Facility: HOSPITAL | Age: 34
End: 2024-03-29
Payer: COMMERCIAL

## 2024-03-29 ENCOUNTER — HOSPITAL ENCOUNTER (EMERGENCY)
Facility: HOSPITAL | Age: 34
Discharge: HOME | End: 2024-03-29
Attending: INTERNAL MEDICINE
Payer: COMMERCIAL

## 2024-03-29 VITALS
TEMPERATURE: 98.4 F | WEIGHT: 230 LBS | SYSTOLIC BLOOD PRESSURE: 152 MMHG | RESPIRATION RATE: 19 BRPM | OXYGEN SATURATION: 99 % | BODY MASS INDEX: 40.75 KG/M2 | HEIGHT: 63 IN | HEART RATE: 76 BPM | DIASTOLIC BLOOD PRESSURE: 106 MMHG

## 2024-03-29 DIAGNOSIS — S30.1XXA CONTUSION OF ABDOMINAL WALL, INITIAL ENCOUNTER: Primary | ICD-10-CM

## 2024-03-29 DIAGNOSIS — S40.012A CONTUSION OF LEFT SHOULDER, INITIAL ENCOUNTER: ICD-10-CM

## 2024-03-29 LAB
ALBUMIN SERPL BCP-MCNC: 4.4 G/DL (ref 3.4–5)
ALP SERPL-CCNC: 65 U/L (ref 33–110)
ALT SERPL W P-5'-P-CCNC: 19 U/L (ref 7–45)
ANION GAP SERPL CALC-SCNC: 12 MMOL/L (ref 10–20)
AST SERPL W P-5'-P-CCNC: 20 U/L (ref 9–39)
BASOPHILS # BLD AUTO: 0.03 X10*3/UL (ref 0–0.1)
BASOPHILS NFR BLD AUTO: 0.4 %
BILIRUB SERPL-MCNC: 0.8 MG/DL (ref 0–1.2)
BUN SERPL-MCNC: 11 MG/DL (ref 6–23)
CALCIUM SERPL-MCNC: 9 MG/DL (ref 8.6–10.3)
CHLORIDE SERPL-SCNC: 103 MMOL/L (ref 98–107)
CO2 SERPL-SCNC: 25 MMOL/L (ref 21–32)
CREAT SERPL-MCNC: 0.54 MG/DL (ref 0.5–1.05)
EGFRCR SERPLBLD CKD-EPI 2021: >90 ML/MIN/1.73M*2
EOSINOPHIL # BLD AUTO: 0.12 X10*3/UL (ref 0–0.7)
EOSINOPHIL NFR BLD AUTO: 1.7 %
ERYTHROCYTE [DISTWIDTH] IN BLOOD BY AUTOMATED COUNT: 17 % (ref 11.5–14.5)
GLUCOSE SERPL-MCNC: 101 MG/DL (ref 74–99)
HCT VFR BLD AUTO: 40.1 % (ref 36–46)
HGB BLD-MCNC: 13.6 G/DL (ref 12–16)
HOLD SPECIMEN: NORMAL
HOLD SPECIMEN: NORMAL
IMM GRANULOCYTES # BLD AUTO: 0.02 X10*3/UL (ref 0–0.7)
IMM GRANULOCYTES NFR BLD AUTO: 0.3 % (ref 0–0.9)
INR PPP: 1.2 (ref 0.9–1.1)
LACTATE SERPL-SCNC: 1.7 MMOL/L (ref 0.4–2)
LYMPHOCYTES # BLD AUTO: 1 X10*3/UL (ref 1.2–4.8)
LYMPHOCYTES NFR BLD AUTO: 14.1 %
MCH RBC QN AUTO: 28.5 PG (ref 26–34)
MCHC RBC AUTO-ENTMCNC: 33.9 G/DL (ref 32–36)
MCV RBC AUTO: 84 FL (ref 80–100)
MONOCYTES # BLD AUTO: 0.53 X10*3/UL (ref 0.1–1)
MONOCYTES NFR BLD AUTO: 7.5 %
NEUTROPHILS # BLD AUTO: 5.37 X10*3/UL (ref 1.2–7.7)
NEUTROPHILS NFR BLD AUTO: 76 %
NRBC BLD-RTO: 0 /100 WBCS (ref 0–0)
PLATELET # BLD AUTO: 108 X10*3/UL (ref 150–450)
POTASSIUM SERPL-SCNC: 3.3 MMOL/L (ref 3.5–5.3)
PROT SERPL-MCNC: 7.8 G/DL (ref 6.4–8.2)
PROTHROMBIN TIME: 13.8 SECONDS (ref 9.8–12.8)
RBC # BLD AUTO: 4.78 X10*6/UL (ref 4–5.2)
SODIUM SERPL-SCNC: 137 MMOL/L (ref 136–145)
WBC # BLD AUTO: 7.1 X10*3/UL (ref 4.4–11.3)

## 2024-03-29 PROCEDURE — 85610 PROTHROMBIN TIME: CPT | Performed by: NURSE PRACTITIONER

## 2024-03-29 PROCEDURE — 71250 CT THORAX DX C-: CPT

## 2024-03-29 PROCEDURE — 72125 CT NECK SPINE W/O DYE: CPT | Performed by: STUDENT IN AN ORGANIZED HEALTH CARE EDUCATION/TRAINING PROGRAM

## 2024-03-29 PROCEDURE — 73080 X-RAY EXAM OF ELBOW: CPT | Mod: LT

## 2024-03-29 PROCEDURE — 70450 CT HEAD/BRAIN W/O DYE: CPT | Performed by: STUDENT IN AN ORGANIZED HEALTH CARE EDUCATION/TRAINING PROGRAM

## 2024-03-29 PROCEDURE — 72128 CT CHEST SPINE W/O DYE: CPT | Mod: RCN

## 2024-03-29 PROCEDURE — 99285 EMERGENCY DEPT VISIT HI MDM: CPT | Mod: 25

## 2024-03-29 PROCEDURE — 74176 CT ABD & PELVIS W/O CONTRAST: CPT | Performed by: STUDENT IN AN ORGANIZED HEALTH CARE EDUCATION/TRAINING PROGRAM

## 2024-03-29 PROCEDURE — 73030 X-RAY EXAM OF SHOULDER: CPT | Mod: LEFT SIDE | Performed by: RADIOLOGY

## 2024-03-29 PROCEDURE — 73030 X-RAY EXAM OF SHOULDER: CPT | Mod: LT

## 2024-03-29 PROCEDURE — 72128 CT CHEST SPINE W/O DYE: CPT | Mod: RCN | Performed by: STUDENT IN AN ORGANIZED HEALTH CARE EDUCATION/TRAINING PROGRAM

## 2024-03-29 PROCEDURE — 72131 CT LUMBAR SPINE W/O DYE: CPT | Mod: RCN | Performed by: STUDENT IN AN ORGANIZED HEALTH CARE EDUCATION/TRAINING PROGRAM

## 2024-03-29 PROCEDURE — 73080 X-RAY EXAM OF ELBOW: CPT | Mod: LEFT SIDE | Performed by: RADIOLOGY

## 2024-03-29 PROCEDURE — 72125 CT NECK SPINE W/O DYE: CPT

## 2024-03-29 PROCEDURE — 71045 X-RAY EXAM CHEST 1 VIEW: CPT | Performed by: RADIOLOGY

## 2024-03-29 PROCEDURE — 72131 CT LUMBAR SPINE W/O DYE: CPT | Mod: RCN

## 2024-03-29 PROCEDURE — 71250 CT THORAX DX C-: CPT | Performed by: STUDENT IN AN ORGANIZED HEALTH CARE EDUCATION/TRAINING PROGRAM

## 2024-03-29 PROCEDURE — 2500000001 HC RX 250 WO HCPCS SELF ADMINISTERED DRUGS (ALT 637 FOR MEDICARE OP): Performed by: NURSE PRACTITIONER

## 2024-03-29 PROCEDURE — 70450 CT HEAD/BRAIN W/O DYE: CPT

## 2024-03-29 PROCEDURE — 84075 ASSAY ALKALINE PHOSPHATASE: CPT | Performed by: NURSE PRACTITIONER

## 2024-03-29 PROCEDURE — 85025 COMPLETE CBC W/AUTO DIFF WBC: CPT | Performed by: NURSE PRACTITIONER

## 2024-03-29 PROCEDURE — 36415 COLL VENOUS BLD VENIPUNCTURE: CPT | Performed by: NURSE PRACTITIONER

## 2024-03-29 PROCEDURE — 83605 ASSAY OF LACTIC ACID: CPT | Performed by: NURSE PRACTITIONER

## 2024-03-29 PROCEDURE — 99283 EMERGENCY DEPT VISIT LOW MDM: CPT

## 2024-03-29 PROCEDURE — 71045 X-RAY EXAM CHEST 1 VIEW: CPT

## 2024-03-29 RX ORDER — OXYCODONE AND ACETAMINOPHEN 5; 325 MG/1; MG/1
1 TABLET ORAL EVERY 6 HOURS PRN
Qty: 12 TABLET | Refills: 0 | Status: SHIPPED | OUTPATIENT
Start: 2024-03-29

## 2024-03-29 RX ORDER — OXYCODONE AND ACETAMINOPHEN 5; 325 MG/1; MG/1
1 TABLET ORAL ONCE
Status: COMPLETED | OUTPATIENT
Start: 2024-03-29 | End: 2024-03-29

## 2024-03-29 RX ADMIN — OXYCODONE HYDROCHLORIDE AND ACETAMINOPHEN 1 TABLET: 5; 325 TABLET ORAL at 22:38

## 2024-03-29 ASSESSMENT — ENCOUNTER SYMPTOMS
NECK PAIN: 1
NAUSEA: 0
HEADACHES: 0
HEMATOLOGIC/LYMPHATIC NEGATIVE: 1
CARDIOVASCULAR NEGATIVE: 1
PSYCHIATRIC NEGATIVE: 1
WEAKNESS: 0
ABDOMINAL PAIN: 1
NEUROLOGICAL NEGATIVE: 1
CONSTITUTIONAL NEGATIVE: 1
SHORTNESS OF BREATH: 0
EYES NEGATIVE: 1
ARTHRALGIAS: 1
ENDOCRINE NEGATIVE: 1
ALLERGIC/IMMUNOLOGIC NEGATIVE: 1
ABDOMINAL DISTENTION: 0
RESPIRATORY NEGATIVE: 1

## 2024-03-29 ASSESSMENT — PAIN DESCRIPTION - ONSET
ONSET: PROGRESSIVE
ONSET: PROGRESSIVE

## 2024-03-29 ASSESSMENT — PAIN DESCRIPTION - PROGRESSION
CLINICAL_PROGRESSION: NOT CHANGED
CLINICAL_PROGRESSION: NOT CHANGED

## 2024-03-29 ASSESSMENT — PAIN DESCRIPTION - PAIN TYPE
TYPE: ACUTE PAIN

## 2024-03-29 ASSESSMENT — PAIN DESCRIPTION - DESCRIPTORS
DESCRIPTORS: SHARP
DESCRIPTORS: BURNING;ACHING;SHARP

## 2024-03-29 ASSESSMENT — PAIN DESCRIPTION - FREQUENCY
FREQUENCY: CONSTANT/CONTINUOUS

## 2024-03-29 ASSESSMENT — LIFESTYLE VARIABLES
TOTAL SCORE: 0
EVER FELT BAD OR GUILTY ABOUT YOUR DRINKING: NO
HAVE PEOPLE ANNOYED YOU BY CRITICIZING YOUR DRINKING: NO
EVER HAD A DRINK FIRST THING IN THE MORNING TO STEADY YOUR NERVES TO GET RID OF A HANGOVER: NO
HAVE YOU EVER FELT YOU SHOULD CUT DOWN ON YOUR DRINKING: NO

## 2024-03-29 ASSESSMENT — PAIN - FUNCTIONAL ASSESSMENT
PAIN_FUNCTIONAL_ASSESSMENT: 0-10

## 2024-03-29 ASSESSMENT — PAIN SCALES - GENERAL
PAINLEVEL_OUTOF10: 9
PAINLEVEL_OUTOF10: 10 - WORST POSSIBLE PAIN
PAINLEVEL_OUTOF10: 8

## 2024-03-29 ASSESSMENT — PAIN DESCRIPTION - LOCATION
LOCATION: SHOULDER

## 2024-03-29 ASSESSMENT — PAIN DESCRIPTION - ORIENTATION
ORIENTATION: LEFT
ORIENTATION: LEFT

## 2024-03-29 ASSESSMENT — COLUMBIA-SUICIDE SEVERITY RATING SCALE - C-SSRS
1. IN THE PAST MONTH, HAVE YOU WISHED YOU WERE DEAD OR WISHED YOU COULD GO TO SLEEP AND NOT WAKE UP?: NO
6. HAVE YOU EVER DONE ANYTHING, STARTED TO DO ANYTHING, OR PREPARED TO DO ANYTHING TO END YOUR LIFE?: NO
2. HAVE YOU ACTUALLY HAD ANY THOUGHTS OF KILLING YOURSELF?: NO
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
## 2024-04-01 ENCOUNTER — TELEPHONE (OUTPATIENT)
Dept: FAMILY MEDICINE CLINIC | Age: 34
End: 2024-04-01
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
## 2024-04-04 DIAGNOSIS — R11.0 NAUSEA: Primary | ICD-10-CM

## 2024-04-04 RX ORDER — ONDANSETRON 4 MG/1
4 TABLET, ORALLY DISINTEGRATING ORAL 3 TIMES DAILY PRN
Qty: 21 TABLET | Refills: 0 | Status: SHIPPED | OUTPATIENT
Start: 2024-04-04

## 2024-04-15 ENCOUNTER — OFFICE VISIT (OUTPATIENT)
Dept: FAMILY MEDICINE CLINIC | Age: 34
End: 2024-04-15
Payer: COMMERCIAL

## 2024-04-15 VITALS
DIASTOLIC BLOOD PRESSURE: 88 MMHG | BODY MASS INDEX: 43.43 KG/M2 | OXYGEN SATURATION: 97 % | HEIGHT: 62 IN | WEIGHT: 236 LBS | HEART RATE: 86 BPM | SYSTOLIC BLOOD PRESSURE: 138 MMHG

## 2024-04-15 DIAGNOSIS — M25.512 ACUTE PAIN OF LEFT SHOULDER: ICD-10-CM

## 2024-04-15 DIAGNOSIS — L03.311 ABDOMINAL WALL CELLULITIS: ICD-10-CM

## 2024-04-15 DIAGNOSIS — R11.0 NAUSEA: ICD-10-CM

## 2024-04-15 DIAGNOSIS — R16.1 SPLENOMEGALY: ICD-10-CM
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
## 2024-04-15 DIAGNOSIS — M54.2 NECK PAIN: ICD-10-CM
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
## 2024-04-15 LAB
BASOPHILS # BLD: 0 K/UL (ref 0–0.2)
BASOPHILS NFR BLD: 0.3 %
EOSINOPHIL # BLD: 0.2 K/UL (ref 0–0.7)
EOSINOPHIL NFR BLD: 2.1 %
ERYTHROCYTE [DISTWIDTH] IN BLOOD BY AUTOMATED COUNT: 15.8 % (ref 11.5–14.5)
HCT VFR BLD AUTO: 41.8 % (ref 37–47)
HGB BLD-MCNC: 14 G/DL (ref 12–16)
LYMPHOCYTES # BLD: 0.8 K/UL (ref 1–4.8)
LYMPHOCYTES NFR BLD: 11.8 %
MCH RBC QN AUTO: 28.4 PG (ref 27–31.3)
MCHC RBC AUTO-ENTMCNC: 33.5 % (ref 33–37)
MCV RBC AUTO: 84.8 FL (ref 79.4–94.8)
MONOCYTES # BLD: 0.5 K/UL (ref 0.2–0.8)
MONOCYTES NFR BLD: 7.6 %
NEUTROPHILS # BLD: 5.5 K/UL (ref 1.4–6.5)
NEUTS SEG NFR BLD: 77.9 %
PLATELET # BLD AUTO: 109 K/UL (ref 130–400)
RBC # BLD AUTO: 4.93 M/UL (ref 4.2–5.4)
WBC # BLD AUTO: 7.1 K/UL (ref 4.8–10.8)

## 2024-04-15 PROCEDURE — 99215 OFFICE O/P EST HI 40 MIN: CPT | Performed by: STUDENT IN AN ORGANIZED HEALTH CARE EDUCATION/TRAINING PROGRAM

## 2024-04-15 RX ORDER — OXYCODONE HYDROCHLORIDE AND ACETAMINOPHEN 5; 325 MG/1; MG/1
1 TABLET ORAL EVERY 8 HOURS PRN
Qty: 15 TABLET | Refills: 0 | Status: SHIPPED | OUTPATIENT
Start: 2024-04-15 | End: 2024-04-20

## 2024-04-15 RX ORDER — LORAZEPAM 1 MG/1
TABLET ORAL
COMMUNITY
Start: 2024-03-28

## 2024-04-15 RX ORDER — SULFAMETHOXAZOLE AND TRIMETHOPRIM 800; 160 MG/1; MG/1
1 TABLET ORAL 2 TIMES DAILY
Qty: 20 TABLET | Refills: 0 | Status: SHIPPED | OUTPATIENT
Start: 2024-04-15 | End: 2024-04-25

## 2024-04-15 RX ORDER — PREDNISONE 50 MG/1
TABLET ORAL
COMMUNITY
Start: 2023-12-13

## 2024-04-15 RX ORDER — OXYCODONE HYDROCHLORIDE AND ACETAMINOPHEN 5; 325 MG/1; MG/1
TABLET ORAL
COMMUNITY
Start: 2024-03-30 | End: 2024-04-15 | Stop reason: SDUPTHER

## 2024-04-15 RX ORDER — ONDANSETRON 4 MG/1
4 TABLET, ORALLY DISINTEGRATING ORAL 3 TIMES DAILY PRN
Qty: 21 TABLET | Refills: 0 | Status: SHIPPED | OUTPATIENT
Start: 2024-04-15

## 2024-04-15 ASSESSMENT — PATIENT HEALTH QUESTIONNAIRE - PHQ9
8. MOVING OR SPEAKING SO SLOWLY THAT OTHER PEOPLE COULD HAVE NOTICED. OR THE OPPOSITE, BEING SO FIGETY OR RESTLESS THAT YOU HAVE BEEN MOVING AROUND A LOT MORE THAN USUAL: NOT AT ALL
SUM OF ALL RESPONSES TO PHQ9 QUESTIONS 1 & 2: 6
3. TROUBLE FALLING OR STAYING ASLEEP: NEARLY EVERY DAY
SUM OF ALL RESPONSES TO PHQ QUESTIONS 1-9: 13
9. THOUGHTS THAT YOU WOULD BE BETTER OFF DEAD, OR OF HURTING YOURSELF: NOT AT ALL
2. FEELING DOWN, DEPRESSED OR HOPELESS: NEARLY EVERY DAY
SUM OF ALL RESPONSES TO PHQ QUESTIONS 1-9: 13
SUM OF ALL RESPONSES TO PHQ QUESTIONS 1-9: 13
10. IF YOU CHECKED OFF ANY PROBLEMS, HOW DIFFICULT HAVE THESE PROBLEMS MADE IT FOR YOU TO DO YOUR WORK, TAKE CARE OF THINGS AT HOME, OR GET ALONG WITH OTHER PEOPLE: SOMEWHAT DIFFICULT
4. FEELING TIRED OR HAVING LITTLE ENERGY: NEARLY EVERY DAY
7. TROUBLE CONCENTRATING ON THINGS, SUCH AS READING THE NEWSPAPER OR WATCHING TELEVISION: NOT AT ALL
SUM OF ALL RESPONSES TO PHQ QUESTIONS 1-9: 13
6. FEELING BAD ABOUT YOURSELF - OR THAT YOU ARE A FAILURE OR HAVE LET YOURSELF OR YOUR FAMILY DOWN: NOT AT ALL
5. POOR APPETITE OR OVEREATING: SEVERAL DAYS
1. LITTLE INTEREST OR PLEASURE IN DOING THINGS: NEARLY EVERY DAY

## 2024-04-15 ASSESSMENT — ENCOUNTER SYMPTOMS
VOMITING: 0
SORE THROAT: 0
COUGH: 0
SHORTNESS OF BREATH: 0
SINUS PRESSURE: 0

## 2024-04-15 NOTE — PROGRESS NOTES
CBC with Auto Differential; Future  - US SPLEEN; Future    6. Splenomegaly    - US SPLEEN; Future      Medications Discontinued During This Encounter   Medication Reason    carvedilol (COREG) 3.125 MG tablet     omeprazole (PRILOSEC) 40 MG delayed release capsule     ondansetron (ZOFRAN-ODT) 4 MG disintegrating tablet REORDER    oxyCODONE-acetaminophen (PERCOCET) 5-325 MG per tablet REORDER       ---------------------------------------------------------------------  Side effects, adverse effects of the medication prescribed today, as well as treatment plan/ rationale and result expectations have been discussed with the patient who expresses understanding and desires to proceed.     Close follow up to evaluate treatment results and for coordination of care.  I have reviewed the patient's medical history in detail and updated the computerized patient record.     As always, patient is advised that if symptoms worsen in any way they will proceed to the nearest emergency room.   --------------------------------------------------------------------    Return if symptoms worsen or fail to improve.    An  electronic signature was used to authenticate this note.    --Essence Espinoza DO on 4/16/2024 at 10:04 AM

## 2024-04-16 ASSESSMENT — ENCOUNTER SYMPTOMS: ABDOMINAL PAIN: 1

## 2024-06-03 ENCOUNTER — HOSPITAL ENCOUNTER (OUTPATIENT)
Dept: PHYSICAL THERAPY | Age: 34
Setting detail: THERAPIES SERIES
Discharge: HOME | End: 2024-06-03
Payer: COMMERCIAL

## 2024-06-03 PROCEDURE — 97161 PT EVAL LOW COMPLEX 20 MIN: CPT

## 2024-06-03 PROCEDURE — 97110 THERAPEUTIC EXERCISES: CPT

## 2024-06-03 ASSESSMENT — PAIN DESCRIPTION - ORIENTATION: ORIENTATION: LEFT

## 2024-06-03 ASSESSMENT — PAIN DESCRIPTION - LOCATION: LOCATION: NECK;SHOULDER

## 2024-06-03 ASSESSMENT — PAIN DESCRIPTION - PAIN TYPE: TYPE: CHRONIC PAIN

## 2024-06-03 ASSESSMENT — PAIN SCALES - GENERAL: PAINLEVEL_OUTOF10: 3

## 2024-06-03 ASSESSMENT — PAIN DESCRIPTION - DESCRIPTORS: DESCRIPTORS: BURNING;NUMBNESS;TINGLING
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
## 2024-06-03 NOTE — THERAPY EVALUATION
Patient Status:[x] Continue/ Initiate plan of Care     [] Discharge PT.  Recommend pt continue with HEP.      [] Additional visits requested, Please re-certify for additional visits:     [] Hold     Signature:  Electronically signed by Charlette Serna PT on 6/3/24 at 2:23 PM EDT        If you have any questions or concerns, please don't hesitate to call.  Thank you for your referral.    I have reviewed this plan of care and certify a need for medically necessary rehabilitation services.    Physician Signature:__________________________________________________________  Date:  Please sign and return

## 2024-06-10 ENCOUNTER — HOSPITAL ENCOUNTER (OUTPATIENT)
Dept: PHYSICAL THERAPY | Age: 34
Setting detail: THERAPIES SERIES
Discharge: HOME | End: 2024-06-10
Payer: COMMERCIAL

## 2024-06-10 PROCEDURE — 97140 MANUAL THERAPY 1/> REGIONS: CPT

## 2024-06-10 PROCEDURE — 97110 THERAPEUTIC EXERCISES: CPT

## 2024-06-10 ASSESSMENT — PAIN SCALES - GENERAL: PAINLEVEL_OUTOF10: 3

## 2024-06-10 ASSESSMENT — PAIN DESCRIPTION - LOCATION: LOCATION: NECK;SHOULDER

## 2024-06-10 ASSESSMENT — PAIN DESCRIPTION - ORIENTATION: ORIENTATION: LEFT
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
## 2024-06-17 ENCOUNTER — HOSPITAL ENCOUNTER (OUTPATIENT)
Dept: PHYSICAL THERAPY | Age: 34
Setting detail: THERAPIES SERIES
Discharge: HOME | End: 2024-06-17
Payer: COMMERCIAL
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
## 2024-06-24 ENCOUNTER — HOSPITAL ENCOUNTER (OUTPATIENT)
Dept: PHYSICAL THERAPY | Age: 34
Setting detail: THERAPIES SERIES
Discharge: HOME | End: 2024-06-24
Payer: COMMERCIAL

## 2024-06-24 PROCEDURE — 97110 THERAPEUTIC EXERCISES: CPT

## 2024-06-24 ASSESSMENT — PAIN DESCRIPTION - ORIENTATION: ORIENTATION: LEFT

## 2024-06-24 ASSESSMENT — PAIN DESCRIPTION - DESCRIPTORS: DESCRIPTORS: ACHING;BURNING

## 2024-06-24 ASSESSMENT — PAIN DESCRIPTION - LOCATION: LOCATION: ARM;NECK;SHOULDER

## 2024-06-24 ASSESSMENT — PAIN SCALES - GENERAL: PAINLEVEL_OUTOF10: 4

## 2024-06-24 ASSESSMENT — PAIN DESCRIPTION - PAIN TYPE: TYPE: CHRONIC PAIN
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
## 2024-07-08 ENCOUNTER — HOSPITAL ENCOUNTER (OUTPATIENT)
Dept: PHYSICAL THERAPY | Age: 34
Setting detail: THERAPIES SERIES
Discharge: HOME | End: 2024-07-08
Payer: COMMERCIAL

## 2024-07-08 PROCEDURE — 97110 THERAPEUTIC EXERCISES: CPT

## 2024-07-08 ASSESSMENT — PAIN DESCRIPTION - ORIENTATION: ORIENTATION: LEFT

## 2024-07-08 ASSESSMENT — PAIN DESCRIPTION - LOCATION: LOCATION: ARM;NECK;SHOULDER

## 2024-07-08 ASSESSMENT — PAIN SCALES - GENERAL: PAINLEVEL_OUTOF10: 6
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
## 2024-07-15 ENCOUNTER — HOSPITAL ENCOUNTER (OUTPATIENT)
Dept: PHYSICAL THERAPY | Age: 34
Setting detail: THERAPIES SERIES
Discharge: HOME | End: 2024-07-15
Payer: COMMERCIAL
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
## 2024-07-22 ENCOUNTER — HOSPITAL ENCOUNTER (OUTPATIENT)
Dept: PHYSICAL THERAPY | Age: 34
Setting detail: THERAPIES SERIES
Discharge: HOME | End: 2024-07-22
Payer: COMMERCIAL
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
## 2024-07-29 ENCOUNTER — APPOINTMENT (OUTPATIENT)
Dept: PHYSICAL THERAPY | Age: 34
End: 2024-07-29
Payer: COMMERCIAL

## 2025-01-03 ENCOUNTER — TELEPHONE (OUTPATIENT)
Age: 35
End: 2025-01-03

## 2025-01-03 DIAGNOSIS — N63.20 MASS OF LEFT BREAST, UNSPECIFIED QUADRANT: Primary | ICD-10-CM

## 2025-01-03 NOTE — TELEPHONE ENCOUNTER
Pt called requesting to see Dr. Leiav as she has a few new lumps in her remaining breast and would like to come in as soon as possible to see Dr. Leiva and have a ultrasound done. Please Advise.

## 2025-01-03 NOTE — TELEPHONE ENCOUNTER
Patient was contacted to discuss her concerns. PT c/o of lumps in LT Breast. A diagnostic mammogram as well as an ultrasound of the left breast was ordered and scheduled at [place]. She also has a complaint of some intermittent needle-like pain in her right breast where her nipple had been.The patient was scheduled for follow up in the clinic. The patient confirmed time, date and location. The patient has no further questions at this time.

## (undated) DEVICE — PACK,BASIC: Brand: MEDLINE

## (undated) DEVICE — TOWEL,OR,DSP,ST,BLUE,STD,4/PK,20PK/CS: Brand: MEDLINE

## (undated) DEVICE — SINGLE PORT MANIFOLD: Brand: NEPTUNE 2

## (undated) DEVICE — SUTURE MCRYL SZ 4-0 L27IN ABSRB UD L19MM PS-2 1/2 CIR PRIM Y426H

## (undated) DEVICE — CONVERTED USE 291618 SPONGE LAPAROTOMY POCKET POUCH RING 18

## (undated) DEVICE — ELECTRODE PT RET AD L9FT HI MOIST COND ADH HYDRGEL CORDED

## (undated) DEVICE — KIT,ANTI FOG,W/SPONGE & FLUID,SOFT PACK: Brand: MEDLINE

## (undated) DEVICE — Z INACTIVE USE 2735373 APPLICATOR FBR LAIN COT WOOD TIP ECONOMICAL

## (undated) DEVICE — TROCAR: Brand: KII FIOS FIRST ENTRY

## (undated) DEVICE — PACK,LAPAROTOMY,NO GOWNS: Brand: MEDLINE

## (undated) DEVICE — AGENT HEMSTAT 3GM OXIDIZED REGENERATED CELOS ABSRB FOR CONT (ORDER MULTIPLES OF 5EA)

## (undated) DEVICE — SUTURE PROL SZ 0 L30IN NONABSORBABLE BLU L36MM CT-1 1/2 CIR 8424H

## (undated) DEVICE — GLOVE ORANGE PI 7 1/2   MSG9075

## (undated) DEVICE — WARMER SCP 2 ANTIFOG LAP DISP

## (undated) DEVICE — SUTURE VCRL SZ 3-0 L27IN ABSRB UD L26MM SH 1/2 CIR J416H

## (undated) DEVICE — DRAPE,CHEST,FENES,15X10,STERIL: Brand: MEDLINE

## (undated) DEVICE — GLOVE ORANGE PI 7   MSG9070

## (undated) DEVICE — Device

## (undated) DEVICE — 1842 FOAM BLOCK NEEDLE COUNTER: Brand: DEVON

## (undated) DEVICE — SLEEVE CMPR SM STD CALF SCD ANEMB LF

## (undated) DEVICE — GOWN,AURORA,NONRNF,XL,30/CS: Brand: MEDLINE

## (undated) DEVICE — BINDER ABD UNIV H9IN WAIST 45-62IN E SFT COT PREM 3 PNL

## (undated) DEVICE — POUCH, INSTRUMENT, 3POCKET, INVISISHIELD: Brand: MEDLINE

## (undated) DEVICE — SPONGE,LAP,18"X18",DLX,XR,ST,5/PK,40/PK: Brand: MEDLINE

## (undated) DEVICE — 4-PORT MANIFOLD: Brand: NEPTUNE 2

## (undated) DEVICE — CHLORAPREP 26ML ORANGE

## (undated) DEVICE — GAUZE,SPONGE,4"X4",12PLY,STERILE,LF,2'S: Brand: MEDLINE

## (undated) DEVICE — NG KIT, 21 GA, 10 PACK: Brand: SITE-RITE

## (undated) DEVICE — TOTAL TRAY, DB, 100% SILI FOLEY, 16FR 10: Brand: MEDLINE

## (undated) DEVICE — GAUZE,SPONGE,FLUFF,6"X6.75",STRL,10/TRAY: Brand: MEDLINE

## (undated) DEVICE — GLOVE SURG 5.5 PF POLYISOPRENE WHT STRL SENSICARE MIC

## (undated) DEVICE — Z DUPLICATE USE 2431315 SET INSUF TBNG HI FLO W/ SMK EVAC FOR PNEUMOCLEAR

## (undated) DEVICE — GLOVE SURG SZ 65 THK91MIL LTX FREE SYN POLYISOPRENE

## (undated) DEVICE — TUBING, SUCTION, 1/4" X 10', STRAIGHT: Brand: MEDLINE

## (undated) DEVICE — MEDI-VAC NON-CONDUCTIVE SUCTION TUBING: Brand: CARDINAL HEALTH

## (undated) DEVICE — COVER LT HNDL BLU PLAS

## (undated) DEVICE — INTENDED FOR TISSUE SEPARATION, AND OTHER PROCEDURES THAT REQUIRE A SHARP SURGICAL BLADE TO PUNCTURE OR CUT.: Brand: BARD-PARKER ® CARBON RIB-BACK BLADES

## (undated) DEVICE — RESERVOIR DRNGE 300ML CANSTR FOR CLOSE WND C J-VAC

## (undated) DEVICE — STANDARD SURGICAL GOWN, L: Brand: CONVERTORS

## (undated) DEVICE — SUTURE VCRL SZ 3-0 L54IN ABSRB VLT LIGAPAK REEL NDL J205G

## (undated) DEVICE — SURGICEL ENDOSCP APPL

## (undated) DEVICE — SUTURE VCRL SZ 4-0 L18IN ABSRB UD L19MM PS-2 3/8 CIR PRIM J496H

## (undated) DEVICE — MEDI-VAC YANKAUER SUCTION HANDLE W/BULBOUS TIP: Brand: CARDINAL HEALTH

## (undated) DEVICE — COUNTER NDL 40 COUNT HLD 70 FOAM BLK ADH W/ MAG

## (undated) DEVICE — YANKAUER,BULB TIP,W/O VENT,RIGID,STERILE: Brand: MEDLINE

## (undated) DEVICE — GOWN,AURORA,NONREINFORCED,LARGE: Brand: MEDLINE

## (undated) DEVICE — TRAY PREP DRY W/ PREM GLV 2 APPL 6 SPNG 2 UNDPD 1 OVERWRAP

## (undated) DEVICE — SPONGE GZ W4XL4IN RAYON POLY FILL CVR W/ NONWOVEN FAB

## (undated) DEVICE — TROCAR: Brand: KII SLEEVE

## (undated) DEVICE — DRAPE, LAVH, STERILE: Brand: MEDLINE

## (undated) DEVICE — DRAPE THER FLUID WARMING 66X44 IN FLAT SLUSH DBL DISC ORS

## (undated) DEVICE — SYRINGE IRRIG 60ML SFT PLIABLE BLB EZ TO GRP 1 HND USE W/

## (undated) DEVICE — APPLICATOR MEDICATED 26 CC SOLUTION HI LT ORNG CHLORAPREP

## (undated) DEVICE — 2000CC GUARDIAN II: Brand: GUARDIAN

## (undated) DEVICE — DRAPE,UTILITY,TAPE,15X26,STERILE: Brand: MEDLINE

## (undated) DEVICE — POOLE SUCTION INSTRUMENT WITH REMOVABLE SHEATH: Brand: POOLE

## (undated) DEVICE — PENCIL ES L3M BTTN SWCH HOLSTER W/ BLDE ELECTRD EDGE

## (undated) DEVICE — KIT CATH 16FR 5ML URIN M INDWL INDWL STR TIP INF CTRL

## (undated) DEVICE — PACK,SET UP,DRAPE: Brand: MEDLINE

## (undated) DEVICE — ADHESIVE SKIN CLSR 0.7ML TOP DERMBND ADV

## (undated) DEVICE — CLIP INT SM TI EZ LD LIG SYS WECK HORZ

## (undated) DEVICE — TISSUE RETRIEVAL SYSTEM: Brand: INZII RETRIEVAL SYSTEM

## (undated) DEVICE — MARKER SURG SKIN GENTIAN VLT REG TIP W/ 6IN RUL

## (undated) DEVICE — PAD,ABDOMINAL,8"X10",ST,LF: Brand: MEDLINE

## (undated) DEVICE — SYRINGE BLB 50CC IRRIG PLIABLE FNGR FLNG GRAD FLSK DISP

## (undated) DEVICE — GLOVE ORANGE PI 8   MSG9080

## (undated) DEVICE — DRAPE EQUIP TRNSPRT CONTAINMENT FOR BK TAB

## (undated) DEVICE — DRAIN ROUND 15FR PERFORATED SURG L49IN DIA3/16IN 10IN H SIL W/O TRCR END PERF

## (undated) DEVICE — LABEL MED MINI W/ MARKER

## (undated) DEVICE — GAUZE,SPONGE,4"X4",16PLY,XRAY,STRL,LF: Brand: MEDLINE

## (undated) DEVICE — SHEET,DRAPE,53X77,STERILE: Brand: MEDLINE

## (undated) DEVICE — SKIN PREP TRAY 4 COMPARTM TRAY: Brand: MEDLINE INDUSTRIES, INC.

## (undated) DEVICE — 1010 S-DRAPE TOWEL DRAPE 10/BX: Brand: STERI-DRAPE™

## (undated) DEVICE — STAPLER INT L30MM THCK TISS GRN B FRM 8 FIRING 2 ROW AUTO